# Patient Record
Sex: FEMALE | Race: WHITE | Employment: OTHER | URBAN - METROPOLITAN AREA
[De-identification: names, ages, dates, MRNs, and addresses within clinical notes are randomized per-mention and may not be internally consistent; named-entity substitution may affect disease eponyms.]

---

## 2017-01-10 RX ORDER — METOPROLOL TARTRATE 50 MG/1
TABLET ORAL
Qty: 180 TAB | Refills: 0 | Status: SHIPPED | OUTPATIENT
Start: 2017-01-10 | End: 2017-05-17 | Stop reason: SDUPTHER

## 2017-02-07 ENCOUNTER — TELEPHONE (OUTPATIENT)
Dept: INTERNAL MEDICINE CLINIC | Age: 62
End: 2017-02-07

## 2017-02-07 NOTE — TELEPHONE ENCOUNTER
Pt returning call.  Best contact [phone]       Message received & copied from [facility]

## 2017-02-08 NOTE — TELEPHONE ENCOUNTER
Called, spoke to pt. Two pt identifiers confirmed. Pt states that she received a call from the office 2/7/17 and vm said just to call back. Pt informed that there was no documentation that anyone from the office called. Pt informed she does have appt 2/15/17 1330 and may have been a reminder call. Pt verbalized understanding of information discussed w/ no further questions at this time.

## 2017-02-13 RX ORDER — PRAVASTATIN SODIUM 20 MG/1
TABLET ORAL
Qty: 30 TAB | Refills: 0 | Status: SHIPPED | OUTPATIENT
Start: 2017-02-13 | End: 2017-05-17 | Stop reason: SDUPTHER

## 2017-02-14 ENCOUNTER — APPOINTMENT (OUTPATIENT)
Dept: INTERNAL MEDICINE CLINIC | Age: 62
End: 2017-02-14

## 2017-02-14 DIAGNOSIS — Z00.00 PHYSICAL EXAM, ANNUAL: ICD-10-CM

## 2017-02-15 ENCOUNTER — OFFICE VISIT (OUTPATIENT)
Dept: INTERNAL MEDICINE CLINIC | Age: 62
End: 2017-02-15

## 2017-02-15 VITALS
BODY MASS INDEX: 38.06 KG/M2 | HEIGHT: 69 IN | WEIGHT: 257 LBS | DIASTOLIC BLOOD PRESSURE: 77 MMHG | SYSTOLIC BLOOD PRESSURE: 133 MMHG | TEMPERATURE: 97.5 F | OXYGEN SATURATION: 96 % | HEART RATE: 73 BPM

## 2017-02-15 DIAGNOSIS — E78.00 HYPERCHOLESTEROLEMIA: ICD-10-CM

## 2017-02-15 DIAGNOSIS — F32.9 REACTIVE DEPRESSION: ICD-10-CM

## 2017-02-15 DIAGNOSIS — E11.9 DIABETES MELLITUS WITHOUT COMPLICATION (HCC): ICD-10-CM

## 2017-02-15 DIAGNOSIS — Z11.59 NEED FOR HEPATITIS C SCREENING TEST: Primary | ICD-10-CM

## 2017-02-15 DIAGNOSIS — I10 ESSENTIAL HYPERTENSION: ICD-10-CM

## 2017-02-15 LAB
ALBUMIN SERPL-MCNC: 4.4 G/DL (ref 3.6–4.8)
ALBUMIN UR QL STRIP: 10 MG/L
ALBUMIN/GLOB SERPL: 1.6 {RATIO} (ref 1.1–2.5)
ALP SERPL-CCNC: 75 IU/L (ref 39–117)
ALT SERPL-CCNC: 20 IU/L (ref 0–32)
AST SERPL-CCNC: 20 IU/L (ref 0–40)
BILIRUB SERPL-MCNC: 0.5 MG/DL (ref 0–1.2)
BUN SERPL-MCNC: 15 MG/DL (ref 8–27)
BUN/CREAT SERPL: 21 (ref 11–26)
CALCIUM SERPL-MCNC: 10 MG/DL (ref 8.7–10.3)
CHLORIDE SERPL-SCNC: 100 MMOL/L (ref 96–106)
CO2 SERPL-SCNC: 28 MMOL/L (ref 18–29)
CREAT SERPL-MCNC: 0.71 MG/DL (ref 0.57–1)
CREATININE, URINE POC: 300 MG/DL
ERYTHROCYTE [DISTWIDTH] IN BLOOD BY AUTOMATED COUNT: 13.2 % (ref 12.3–15.4)
EST. AVERAGE GLUCOSE BLD GHB EST-MCNC: 140 MG/DL
GLOBULIN SER CALC-MCNC: 2.8 G/DL (ref 1.5–4.5)
GLUCOSE SERPL-MCNC: 116 MG/DL (ref 65–99)
HBA1C MFR BLD: 6.5 % (ref 4.8–5.6)
HCT VFR BLD AUTO: 41.7 % (ref 34–46.6)
HGB BLD-MCNC: 13.7 G/DL (ref 11.1–15.9)
MCH RBC QN AUTO: 29.3 PG (ref 26.6–33)
MCHC RBC AUTO-ENTMCNC: 32.9 G/DL (ref 31.5–35.7)
MCV RBC AUTO: 89 FL (ref 79–97)
MICROALBUMIN/CREAT RATIO POC: <30 MG/G
PLATELET # BLD AUTO: 226 X10E3/UL (ref 150–379)
POTASSIUM SERPL-SCNC: 5.3 MMOL/L (ref 3.5–5.2)
PROT SERPL-MCNC: 7.2 G/DL (ref 6–8.5)
RBC # BLD AUTO: 4.68 X10E6/UL (ref 3.77–5.28)
SODIUM SERPL-SCNC: 142 MMOL/L (ref 134–144)
WBC # BLD AUTO: 7.3 X10E3/UL (ref 3.4–10.8)

## 2017-02-15 RX ORDER — METFORMIN HYDROCHLORIDE 500 MG/1
500 TABLET, EXTENDED RELEASE ORAL
Qty: 30 TAB | Refills: 6 | Status: SHIPPED | OUTPATIENT
Start: 2017-02-15 | End: 2017-05-17 | Stop reason: SDUPTHER

## 2017-02-15 NOTE — MR AVS SNAPSHOT
Visit Information Date & Time Provider Department Dept. Phone Encounter #  
 2/15/2017  1:30 PM Stormy Bennett, [address] [phone] Follow-up Instructions Return in about 3 months (around 5/15/2017). Upcoming Health Maintenance Date Due Hepatitis C Screening 1955 DTaP/Tdap/Td series (1 - Tdap) 3/11/1975 BREAST CANCER SCRN MAMMOGRAM 7/7/2018 PAP AKA CERVICAL CYTOLOGY 10/12/2018 COLONOSCOPY 8/11/2026 Allergies as of 2/15/2017  Review Complete On: 2/15/2017 By: Stormy Bennett MD  
 No Known Allergies Current Immunizations  Never Reviewed Name Date Influenza Vaccine 10/10/2016, 9/22/2014 Influenza Vaccine Split 9/12/2012 Zoster Vaccine, Live 4/5/2015 Not reviewed this visit You Were Diagnosed With   
  
 Codes Comments Need for hepatitis C screening test    -  Primary ICD-10-CM: Z11.59 
ICD-9-CM: V73.89 Essential hypertension     ICD-10-CM: I10 
ICD-9-CM: 401.9 Hypercholesterolemia     ICD-10-CM: E78.00 ICD-9-CM: 272.0 Reactive depression     ICD-10-CM: F32.9 ICD-9-CM: 300.4 Diabetes mellitus without complication ([facility]     RFG-04-YM: E11.9 ICD-9-CM: 250.00 Vitals BP Pulse Temp Height(growth percentile) Weight(growth percentile) SpO2  
 133/77 (BP 1 Location: Right arm, BP Patient Position: Sitting) 73 97.5 °F (36.4 °C) (Oral) 5' 9\" (1.753 m) 257 lb (116.6 kg) 96% BMI OB Status Smoking Status 37.95 kg/m2 Postmenopausal Never Smoker BMI and BSA Data Body Mass Index Body Surface Area  
 37.95 kg/m 2 2.38 m 2 Preferred Pharmacy Pharmacy Name Phone [facility] [phone], [facility] AT [place] [address] [phone] Your Updated Medication List  
  
   
This list is accurate as of: 2/15/17  1:52 PM.  Always use your most recent med list.  
  
  
  
  
 aspirin delayed-release 81 mg tablet Commonly known as:  GSFAC-12 Take 1 Tab by mouth daily. Blood-Glucose Meter monitoring kit Commonly known as:  FREESTYLE LITE METER Daily  
  
 glucose blood VI test strips strip Commonly known as:  FREESTYLE LITE STRIPS  
2 per week  
  
 lisinopril-hydroCHLOROthiazide 10-12.5 mg per tablet Commonly known as:  PRINZIDE, ZESTORETIC  
TAKE 1 TABLET BY MOUTH EVERY DAY  
  
 metFORMIN  mg tablet Commonly known as:  GLUCOPHAGE XR Take 1 Tab by mouth daily (with dinner). metoprolol tartrate 50 mg tablet Commonly known as:  LOPRESSOR  
TAKE 1 TABLET BY MOUTH TWICE DAILY pravastatin 20 mg tablet Commonly known as:  PRAVACHOL  
TAKE 1 TABLET BY MOUTH EVERY NIGHT AT BEDTIME  
  
 sertraline 50 mg tablet Commonly known as:  ZOLOFT  
TAKE 1 TABLET BY MOUTH EVERY DAY Prescriptions Sent to Pharmacy Refills  
 metFORMIN ER (GLUCOPHAGE XR) 500 mg tablet 6 Sig: Take 1 Tab by mouth daily (with dinner). Class: Normal  
 [facility] [address] [place] [address] Ph #: [phone] Route: Oral  
  
We Performed the Following AMB POC URINE, MICROALBUMIN, SEMIQUANT (3 RESULTS) [64088 CPT(R)] Follow-up Instructions Return in about 3 months (around 5/15/2017). To-Do List   
 02/22/2017 Lab:  HEMOGLOBIN A1C WITH EAG   
  
 02/22/2017 Lab:  HEPATITIS C AB   
  
 02/22/2017 Lab:  LIPID PANEL   
  
 02/22/2017 Lab:  METABOLIC PANEL, COMPREHENSIVE   
  
 02/22/2017 Lab:  TSH 3RD GENERATION Patient Instructions Can taper off zoloft Start metformin xr 500mg Labs prior to follow up Eye exam  
 
  
Introducing [facility]! Dear Alexandrea: Thank you for requesting a [employer] account. Our records indicate that you already have an active [employer] account.   You can access your account anytime at [email or web address] Did you know that you can access your hospital and ER discharge instructions at any time in [employer]? You can also review all of your test results from your hospital stay or ER visit. Additional Information If you have questions, please visit the Frequently Asked Questions section of the [employer] website at [email or web address] Remember, [employer] is NOT to be used for urgent needs. For medical emergencies, dial 911. Now available from your iPhone and Android! Please provide this summary of care documentation to your next provider. Your primary care clinician is listed as Dann Akhtar. If you have any questions after today's visit, please call [phone].

## 2017-02-15 NOTE — PROGRESS NOTES
HISTORY OF PRESENT ILLNESS  June Meghann Ellington is a 57 y.o. female. HPI   Last here 10/11/16.  Pt is here to f/u on chronic conditions    BP today is 133/77  BP at home running around 130s/70s  Continues metoprolol 50mg BID and lisinopril-HCTZ 10-12.5mg daily     Pt checks her BS occasionally at home- has been 109, 110 in the AM fasting  BS at home 120s at the highest in the mornings when fasting  Her a1c is now in diabetic range in 2/17 at 6.5, she has been in diabetic ranges in the past-- about 10 years ago when she was about 20 lbs heavier  Discussed at this time she is considered diabetic and discussed this at length with her today  She wonders if this will improve with weight loss-addressed with her, discussed substantial w/l would be required to improve her a1c back in prediabetic ranges  However, her weight has not improved since 2010 and discussed this with her   Discussed either starting metformin today or losing about 20-30 lbs or more down the road   Discussed with her I would recommend metformin but I would also think that holding off is reasonable  She wonders what metformin does to her body- addressed with her, improves insulin resistance, would be able to come off this down the road potentially and is not dependence causing or habit forming  At this time, patient is amenable to starting metformin XR 500mg once daily, can take with either breakfast or dinner   As she is considered diabetic, needs annual eye exam and advised her to schedule this  Advised her to continue monitoring her BS at home, increase frequency of checks    Since last visit, pt has not been having recurrent dizziness   She wonders if this dizziness was related to her BS- addressed with her   No further episodes     Continues pravachol 20mg daily for cholesterol- at goal in June     Reviewed last labs 2/17  a1c in diabetic range at 6.5,  fasting, potassium mildly elevated   Ordered labs to complete prior to follow up    Wt is up 3 lbs since last visit  Pt has been working with [employer] program for w/l  She has been eating more with recent holidays  She wonders about wine as she has glasses of this-discussed  She has been cutting back on carbohydrates  Discussed diet and weight loss, needs substantial weight loss     Continues zoloft 50mg daily for depression, works well, happy with dose   Pt is not sure she needs this anymore and would like to try tapering off this   Discussed with her how to taper off this medication correctly  Discussed she can go back to taking this daily if she starts having depressive sx      PREVENTIVE:    Colonoscopy: 8/11/16, Dr. Umang Nation, repeat 10 years  Pap: [facility], 10/15, due, will schedule  Mammogram: 7/07/16 negative  Dexa: 6/13 normal, repeat 6/16, due, ordered, will schedule  Tdap: 3/31/2014    Pneumovax: out of this, will have at f/u  [address]: not yet needed  Zostavax: 4/05/2015  Flu shot: 10/10/2016  Foot exam: 6/16  Microalbumin: 1/16, 2/17 ordered   A1c: 5/13 6.2, 5/14 5.9 , 6/14 6.1, 9/14 5.9, 1/15 6.4, 7/15 5.8, 1/15 6.1, 6/16 6.4, 10/16 6.0, 2/17 6.5  Eye exam: working on this, advised to schedule, plans to complete this month 2/17 with Dr. Trixie Farias  Hep C screen: ordered to complete with next labs    Lipids: 6/16 LDL 69       Patient Active Problem List    Diagnosis Date Noted    Prediabetes 01/06/2016    Hypercholesterolemia     Hypertension     Glucose intolerance (impaired glucose tolerance)     Depression      Current Outpatient Prescriptions   Medication Sig Dispense Refill    pravastatin (PRAVACHOL) 20 mg tablet TAKE 1 TABLET BY MOUTH EVERY NIGHT AT BEDTIME 30 Tab 0    metoprolol tartrate (LOPRESSOR) 50 mg tablet TAKE 1 TABLET BY MOUTH TWICE DAILY 180 Tab 0    lisinopril-hydroCHLOROthiazide (PRINZIDE, ZESTORETIC) 10-12.5 mg per tablet TAKE 1 TABLET BY MOUTH EVERY DAY 90 Tab 0    sertraline (ZOLOFT) 50 mg tablet TAKE 1 TABLET BY MOUTH EVERY DAY 90 Tab 0    Blood-Glucose Meter (FREESTYLE LITE METER) monitoring kit Daily 1 Kit 0    glucose blood VI test strips (FREESTYLE LITE STRIPS) strip 2 per week 50 Strip 1    aspirin delayed-release (ASPIR-81) 81 mg tablet Take 1 Tab by mouth daily. 80 Tab 3     Past Surgical History   Procedure Laterality Date    Hx breast lumpectomy       benign; left    Colonoscopy N/A 8/11/2016     COLONOSCOPY [facility] by Danuta Dixon MD at [facility] ENDOSCOPY      Lab Results  Component Value Date/Time   WBC 7.3 02/14/2017 12:42 PM   HGB 13.7 02/14/2017 12:42 PM   HCT 41.7 02/14/2017 12:42 PM   PLATELET 931 53/37/6129 12:42 PM   MCV 89 02/14/2017 12:42 PM       Lab Results  Component Value Date/Time   Cholesterol, total 136 06/24/2016 01:40 PM   HDL Cholesterol 43 06/24/2016 01:40 PM   LDL, calculated 69 06/24/2016 01:40 PM   Triglyceride 120 06/24/2016 01:40 PM   CHOL/HDL Ratio 4.3 08/16/2010 01:49 PM       Lab Results  Component Value Date/Time   GFR est  02/14/2017 12:42 PM   GFR est non-AA 92 02/14/2017 12:42 PM   Creatinine 0.71 02/14/2017 12:42 PM   BUN 15 02/14/2017 12:42 PM   Sodium 142 02/14/2017 12:42 PM   Potassium 5.3 02/14/2017 12:42 PM   Chloride 100 02/14/2017 12:42 PM   CO2 28 02/14/2017 12:42 PM         Review of Systems   Respiratory: Negative for shortness of breath. Cardiovascular: Negative for chest pain. Physical Exam   Constitutional: She is oriented to person, place, and time. She appears well-developed and well-nourished. No distress. HENT:   Head: Normocephalic and atraumatic. Eyes: Conjunctivae and EOM are normal. Right eye exhibits no discharge. Left eye exhibits no discharge. Neck: Normal range of motion. Neck supple. Cardiovascular: Normal rate, regular rhythm and normal heart sounds. Exam reveals no gallop and no friction rub. No murmur heard. Pulmonary/Chest: Effort normal and breath sounds normal. No respiratory distress. She has no wheezes. She has no rales. She exhibits no tenderness.    Musculoskeletal: She exhibits no edema, tenderness or deformity. Lymphadenopathy:     She has no cervical adenopathy. Neurological: She is alert and oriented to person, place, and time. Coordination normal.   Skin: Skin is warm and dry. No rash noted. She is not diaphoretic. No erythema. No pallor. Psychiatric: She has a normal mood and affect. Her behavior is normal.       ASSESSMENT and PLAN    ICD-10-CM ICD-9-CM    1. Need for hepatitis C screening test    Ordered to complete with next labs A87.50 L04.86 METABOLIC PANEL, COMPREHENSIVE      HEMOGLOBIN A1C WITH EAG      TSH 3RD GENERATION      LIPID PANEL      HEPATITIS C AB   2. Essential hypertension    Controlled on lisinopril-HCTZ and metoprolol 50mg BID, continue. Q72 118.4 METABOLIC PANEL, COMPREHENSIVE      HEMOGLOBIN A1C WITH EAG      TSH 3RD GENERATION      LIPID PANEL      HEPATITIS C AB   3. Hypercholesterolemia    Controlled with pravachol, will repeat lipids prior to follow up. X76.70 523.8 METABOLIC PANEL, COMPREHENSIVE      HEMOGLOBIN A1C WITH EAG      TSH 3RD GENERATION      LIPID PANEL      HEPATITIS C AB   4. Reactive depression    On zoloft, she has been on this for many years and is unsure if she needs to stay on it. Discussed tapering off to every other day for about 4 weeks, then a few times per week, then stop if her sx of depression do not recur. R35.6 445.5 METABOLIC PANEL, COMPREHENSIVE      HEMOGLOBIN A1C WITH EAG      TSH 3RD GENERATION      LIPID PANEL      HEPATITIS C AB   5. Diabetes mellitus without complication ([address]    Relatively new dx, she had been told she was a diabetic in 2009 or 2010 and had lost a lot of weight and this had improved. However, her a1c is now up to 6.5, she is having BS readings into the 120s at home, consistent with diabetes. Due for eye exam and advised her to schedule, she will give urine sample today for microalbumin, will start metformin XR 500mg daily.  Had a long discussion with her regarding treatment options, she will work aggressively on weight loss. E11.9 250.00 AMB POC URINE, MICROALBUMIN, SEMIQUANT (3 RESULTS)      METABOLIC PANEL, COMPREHENSIVE      HEMOGLOBIN A1C WITH EAG      TSH 3RD GENERATION      LIPID PANEL      HEPATITIS C AB        Written by Reyes Zapata, as dictated by Tati Tamayo MD.    Current diagnosis and concerns discussed with pt at length. Understands risks and benefits or current treatment plan and medications and accepts the treatment and medication with any possible risks.   Pt asks appropriate questions which were answered.   Pt instructed to call with any concerns or problems.

## 2017-03-09 ENCOUNTER — PATIENT OUTREACH (OUTPATIENT)
Dept: OTHER | Age: 62
End: 2017-03-09

## 2017-03-09 NOTE — PROGRESS NOTES
Patient on report as eligible for Case Management. Left discreet message on voicemail with this CM contact information. Will attempt to contact again to offer [phone] Management services.

## 2017-03-10 ENCOUNTER — PATIENT OUTREACH (OUTPATIENT)
Dept: OTHER | Age: 62
End: 2017-03-10

## 2017-03-10 NOTE — PROGRESS NOTES
Patient returned previous outreach attempt. Verified identity with two identifiers. Discussed [phone] program. Patient consented for participation. Reports she needs to work on lowering her A1C. She is available Tuesday 03/14/17 to complete assessment and establish goals.  Welcome letter will be sent by email at patient's request.

## 2017-03-16 ENCOUNTER — PATIENT OUTREACH (OUTPATIENT)
Dept: OTHER | Age: 62
End: 2017-03-16

## 2017-03-16 NOTE — PROGRESS NOTES
[facility] progress note    Patient eligible for [employer] care management    PMH:   Past Medical History:   Diagnosis Date    Depression     Glucose intolerance (impaired glucose tolerance)     Hypercholesterolemia     Hypertension        Social History:   Social History     Social History    Marital status:      Spouse name: N/A    Number of children: N/A    Years of education: N/A     Occupational History    Not on file. Social History Main Topics    Smoking status: Never Smoker    Smokeless tobacco: Never Used    Alcohol use Yes      Comment: social    Drug use: No    Sexual activity: Yes     Partners: Male     Other Topics Concern    Not on file     Social History Narrative       Two patient identifiers verified. Patient has diagnosis of HTN, prediabetes, and hypercholesterolemia. Care management assessment initiated:    Patient stated problem: \"I really need to get my A1C lower\"  Patient stated Strength: pt reports commitment to improve health due to strong family history of diabetes and debility. Patient stated Weakness: \"I like to snack\"    Role of nutrition and exercise and its effect on A1C reviewed with patient. Patient is already making positive dietary adjustments. She has a very busy job so we will assist in food label reading as well as making choices for quick, nutrient dense breakfasts and lunches. She is currently decreasing the diet sodas she consumes and replacing them with water. She will keep a food log for the next week to better understand her food choices, saravanan snacks. Emotional Status/Adjustment to Health State: Pt reports feeling positive about these changes. States \"I know I can do this. \"    Barriers/Challenges to Care: Pt reports very busy work schedule and fatigue once home from work. Patient identified Short Term Goal :  1. Begin exercise program 3-5 times/week (suggested starting 10-15 minute walking program).  We discussed how to incorporate this into her work day. 2. Continue to substitute diet soda for water- patient reports she wants to stop drinking diet sodas entirely. We discussed ways to flavor water (fresh fruit/lemon). 3. Keep food log for 1 week to better understand nutrition. Patient identified Long Term Goal: decrease A1C    Patient reports next scheduled follow up with with PCP 05/17. Next planned call from [facility] 3/23/17    Patient verbalized understanding of all information discussed. Pt has my contact information for any further questions, concerns, or needs. Pt has requested the following information be emailed:    1. Suggestions for on-the-go breakfast/lunch choices   2. Information on whole grains   3. Suggestions on healthy dining at a [facility] [employer].

## 2017-03-17 ENCOUNTER — PATIENT OUTREACH (OUTPATIENT)
Dept: OTHER | Age: 62
End: 2017-03-17

## 2017-03-17 NOTE — PROGRESS NOTES
Email sent to patient per patient's request. Patient had wanted more information regarding whole grain food options. Sent ChooseMyPlate whole grains educational PDF, we will discuss choices at the next CM call. Patient had also requested suggestions for fast, healthy breakfast choices. Recipes from ADA and AHA sent along with quick breakfast options from [employer]. She had also requested assistance in making health choices at [employer]. She was encouraged to avoid crunchy foods as they often are fried and to select fish, chicken or black bean options; minimizing cheese, sour cream or guacomole and instead to select vegetable toppings and salsa. Next scheduled CM call 03/23/17.

## 2017-03-20 RX ORDER — LISINOPRIL AND HYDROCHLOROTHIAZIDE 10; 12.5 MG/1; MG/1
TABLET ORAL
Qty: 90 TAB | Refills: 0 | Status: SHIPPED | OUTPATIENT
Start: 2017-03-20 | End: 2017-05-17 | Stop reason: SDUPTHER

## 2017-03-20 RX ORDER — PRAVASTATIN SODIUM 20 MG/1
TABLET ORAL
Qty: 30 TAB | Refills: 0 | Status: SHIPPED | OUTPATIENT
Start: 2017-03-20 | End: 2017-05-17 | Stop reason: SDUPTHER

## 2017-03-20 RX ORDER — SERTRALINE HYDROCHLORIDE 50 MG/1
TABLET, FILM COATED ORAL
Qty: 90 TAB | Refills: 0 | Status: SHIPPED | OUTPATIENT
Start: 2017-03-20 | End: 2017-05-17 | Stop reason: SDUPTHER

## 2017-03-23 ENCOUNTER — PATIENT OUTREACH (OUTPATIENT)
Dept: OTHER | Age: 62
End: 2017-03-23

## 2017-03-24 ENCOUNTER — PATIENT OUTREACH (OUTPATIENT)
Dept: OTHER | Age: 62
End: 2017-03-24

## 2017-03-24 NOTE — PROGRESS NOTES
Outgoing email sent to patient with [employer] information on fruit. Also attached 2 AHA recipes. Patient does not like salmon so tuna recipe was sent as alternative.   Next CM outreach: 04/04/17

## 2017-04-04 ENCOUNTER — PATIENT OUTREACH (OUTPATIENT)
Dept: OTHER | Age: 62
End: 2017-04-04

## 2017-04-04 NOTE — PROGRESS NOTES
Telephone outreach from patient. Patient identity verified with two identifiers. She reports she is currently at work, however has a break and wanted to share some positive news. Ms. Bony Ocampo reports her fasting blood sugar this morning was 108. She has taken it before and it has been 120-130's, so she reports being pleased with this change. She also reports that she has lost a total of 3 pounds in the last few weeks. Ms. Bony Ocampo states that she is continuing to exercise almost daily- either walking, or doing an aerobics program on video. She did a lot of gardening and yard work this weekend. She states the exercise is really helping her to feel better overall. She has also been working on improving her nutrition. She is increasing her fresh vegetables and trying to make different choices for snacks and quick foods. She has stopped drinking soda entirely and has changed her popcorn choice from butter to air popped. Care Plan:  Long Term Goal: Ms. Bony Ocampo would like to decrease her A1C. Her last A1C was 6.0, however she reports that she has not been focusing upon her health and she is worried about complications of diabetes. Short Term Goal: Improve nutrition. Ms. Bony Ocampo has been using the Plate Method from [facility]. She has been focusing on improving her snack choices. She has stopped drinking sodas and increased her water consumption. She is currently working on increasing her vegetable intake. Short Term Goal: Begin exercising. Currently Ms. Bony Ocampo is exercising 5 days a week- either walking or aerobics. She reports she is really enjoying this and is feeling better in general since starting this routine. Preventative Care: Ms. Bony Ocampo needs to schedule an ophthalmology appointment. She reports she has the name of a provider she wants to use. She was encouraged to confirm they were in network and then to schedule a check-up.  She was notified she should request her visit records be sent to her PCP.     Next CM  Follow-up: 04/18/17

## 2017-04-13 ENCOUNTER — TELEPHONE (OUTPATIENT)
Dept: INTERNAL MEDICINE CLINIC | Age: 62
End: 2017-04-13

## 2017-04-13 NOTE — TELEPHONE ENCOUNTER
Spoke to [employer] ([employer]). Cris Mujica states that the labs collected 2/14/17 were coded as Physical Routine labs instead of it being r/t Dm/blood sugar. Cris Mujica informed that LPN RR will reach out to [facility] to give the correct dx code. Cris Mujica verbalized understanding of information discussed w/ no further questions at this time.

## 2017-04-13 NOTE — TELEPHONE ENCOUNTER
Patient's , Ruth Dominguez states he needs a call back in reference to patient's recent lab orders being coded incorrectly & needing to be corrected & re-submitted. Please call to discuss.  Thank you

## 2017-04-14 NOTE — TELEPHONE ENCOUNTER
Spoke to [employer] at [employer]. Geraldo Daley informed that the labs from 2/14/17 need a new dx code to be covered. Geraldo Daley given dx code E11.9 as noted in PCP last office note 2/15/17. Geraldo Daley states she will re-file for insurance purposes.

## 2017-04-17 RX ORDER — PRAVASTATIN SODIUM 20 MG/1
TABLET ORAL
Qty: 30 TAB | Refills: 0 | Status: SHIPPED | OUTPATIENT
Start: 2017-04-17 | End: 2017-05-17 | Stop reason: SDUPTHER

## 2017-04-18 ENCOUNTER — PATIENT OUTREACH (OUTPATIENT)
Dept: OTHER | Age: 62
End: 2017-04-18

## 2017-04-18 NOTE — PROGRESS NOTES
Telephone outreach to patient for care management follow-up. Identity confirmed with two identifiers. She reports she is doing well. States she spent [place] with family and did not follow her nutrition regimen, but that she has since gone back to low carb, no concentrated sweets. Mrs. Vibha Sabillon reports she has completely stopped drinking soda and is now drinking water. She states she is attempting to eat a balanced breakfast each morning- we have provided suggestions from ADA. She states she is still walking 3-5 times per week. She is also using an aerobics video on days she is off from work. She reports she feels very good and is sleeping well. She did call and schedule an eye exam with an ophthalmologist- she reports the earliest appointment was 06/08/17. She has also called and scheduled her well woman visit for 06/08/17. Mrs. Vibha Sabillon has a PCP appointment 05/17. She would like for us to call after this appointment to see whether she has any other health goals. She reports she feels like she can continue to make these dietary improvements and maintain her exercise schedule. She states \"I'm creating a whole new lifestyle for myself. \"

## 2017-05-11 ENCOUNTER — LAB ONLY (OUTPATIENT)
Dept: INTERNAL MEDICINE CLINIC | Age: 62
End: 2017-05-11

## 2017-05-11 DIAGNOSIS — F32.9 REACTIVE DEPRESSION: ICD-10-CM

## 2017-05-11 DIAGNOSIS — I10 ESSENTIAL HYPERTENSION: ICD-10-CM

## 2017-05-11 DIAGNOSIS — Z11.59 NEED FOR HEPATITIS C SCREENING TEST: ICD-10-CM

## 2017-05-11 DIAGNOSIS — E11.9 DIABETES MELLITUS WITHOUT COMPLICATION (HCC): ICD-10-CM

## 2017-05-11 DIAGNOSIS — E78.00 HYPERCHOLESTEROLEMIA: ICD-10-CM

## 2017-05-12 LAB
ALBUMIN SERPL-MCNC: 4.5 G/DL (ref 3.6–4.8)
ALBUMIN/GLOB SERPL: 1.6 {RATIO} (ref 1.2–2.2)
ALP SERPL-CCNC: 70 IU/L (ref 39–117)
ALT SERPL-CCNC: 23 IU/L (ref 0–32)
AST SERPL-CCNC: 17 IU/L (ref 0–40)
BILIRUB SERPL-MCNC: 0.5 MG/DL (ref 0–1.2)
BUN SERPL-MCNC: 17 MG/DL (ref 8–27)
BUN/CREAT SERPL: 23 (ref 12–28)
CALCIUM SERPL-MCNC: 9.7 MG/DL (ref 8.7–10.3)
CHLORIDE SERPL-SCNC: 104 MMOL/L (ref 96–106)
CHOLEST SERPL-MCNC: 135 MG/DL (ref 100–199)
CO2 SERPL-SCNC: 25 MMOL/L (ref 18–29)
CREAT SERPL-MCNC: 0.74 MG/DL (ref 0.57–1)
EST. AVERAGE GLUCOSE BLD GHB EST-MCNC: 131 MG/DL
GLOBULIN SER CALC-MCNC: 2.8 G/DL (ref 1.5–4.5)
GLUCOSE SERPL-MCNC: 125 MG/DL (ref 65–99)
HBA1C MFR BLD: 6.2 % (ref 4.8–5.6)
HCV AB S/CO SERPL IA: <0.1 S/CO RATIO (ref 0–0.9)
HDLC SERPL-MCNC: 41 MG/DL
LDLC SERPL CALC-MCNC: 67 MG/DL (ref 0–99)
POTASSIUM SERPL-SCNC: 4.7 MMOL/L (ref 3.5–5.2)
PROT SERPL-MCNC: 7.3 G/DL (ref 6–8.5)
SODIUM SERPL-SCNC: 143 MMOL/L (ref 134–144)
TRIGL SERPL-MCNC: 133 MG/DL (ref 0–149)
TSH SERPL DL<=0.005 MIU/L-ACNC: 0.84 UIU/ML (ref 0.45–4.5)
VLDLC SERPL CALC-MCNC: 27 MG/DL (ref 5–40)

## 2017-05-17 ENCOUNTER — OFFICE VISIT (OUTPATIENT)
Dept: INTERNAL MEDICINE CLINIC | Age: 62
End: 2017-05-17

## 2017-05-17 VITALS
TEMPERATURE: 97.8 F | RESPIRATION RATE: 16 BRPM | SYSTOLIC BLOOD PRESSURE: 120 MMHG | BODY MASS INDEX: 37.77 KG/M2 | DIASTOLIC BLOOD PRESSURE: 73 MMHG | HEART RATE: 62 BPM | HEIGHT: 69 IN | WEIGHT: 255 LBS | OXYGEN SATURATION: 96 %

## 2017-05-17 DIAGNOSIS — Z23 ENCOUNTER FOR IMMUNIZATION: ICD-10-CM

## 2017-05-17 DIAGNOSIS — E66.9 OBESITY (BMI 35.0-39.9 WITHOUT COMORBIDITY): ICD-10-CM

## 2017-05-17 DIAGNOSIS — Z12.39 BREAST SCREENING: ICD-10-CM

## 2017-05-17 DIAGNOSIS — E11.9 DIABETES MELLITUS WITHOUT COMPLICATION (HCC): Primary | ICD-10-CM

## 2017-05-17 DIAGNOSIS — I10 ESSENTIAL HYPERTENSION: ICD-10-CM

## 2017-05-17 DIAGNOSIS — F32.9 REACTIVE DEPRESSION: ICD-10-CM

## 2017-05-17 DIAGNOSIS — D18.01 CHERRY HEMANGIOMA: ICD-10-CM

## 2017-05-17 DIAGNOSIS — E78.00 HYPERCHOLESTEROLEMIA: ICD-10-CM

## 2017-05-17 RX ORDER — METOPROLOL TARTRATE 50 MG/1
50 TABLET ORAL 2 TIMES DAILY
Qty: 60 TAB | Refills: 6 | Status: SHIPPED | OUTPATIENT
Start: 2017-05-17 | End: 2017-11-28 | Stop reason: SDUPTHER

## 2017-05-17 RX ORDER — METFORMIN HYDROCHLORIDE 500 MG/1
1000 TABLET, EXTENDED RELEASE ORAL
Qty: 60 TAB | Refills: 6 | Status: SHIPPED | OUTPATIENT
Start: 2017-05-17 | End: 2017-09-19 | Stop reason: SDUPTHER

## 2017-05-17 RX ORDER — PRAVASTATIN SODIUM 20 MG/1
20 TABLET ORAL DAILY
Qty: 30 TAB | Refills: 6 | Status: SHIPPED | OUTPATIENT
Start: 2017-05-17 | End: 2017-12-18 | Stop reason: SDUPTHER

## 2017-05-17 RX ORDER — LISINOPRIL AND HYDROCHLOROTHIAZIDE 10; 12.5 MG/1; MG/1
1 TABLET ORAL DAILY
Qty: 30 TAB | Refills: 6 | Status: SHIPPED | OUTPATIENT
Start: 2017-05-17 | End: 2017-12-18 | Stop reason: SDUPTHER

## 2017-05-17 RX ORDER — SERTRALINE HYDROCHLORIDE 50 MG/1
50 TABLET, FILM COATED ORAL DAILY
Qty: 90 TAB | Refills: 3 | Status: SHIPPED | OUTPATIENT
Start: 2017-05-17 | End: 2017-11-24 | Stop reason: SDUPTHER

## 2017-05-17 NOTE — MR AVS SNAPSHOT
Visit Information Date & Time Provider Department Dept. Phone Encounter #  
 5/17/2017  3:00 PM Ivanna Scott, [address] [phone] [id number] Follow-up Instructions Return in about 3 months (around 8/17/2017). Your Appointments 5/17/2017  3:00 PM  
ROUTINE CARE with Ivanna Scott, [address] [facility]) Appt Note: 3 month f/u  
 [address] [address]  
[address] [facility] [address] [facility] Upcoming Health Maintenance Date Due DTaP/Tdap/Td series (1 - Tdap) 3/11/1975 INFLUENZA AGE 9 TO ADULT 8/1/2017 BREAST CANCER SCRN MAMMOGRAM 7/7/2018 PAP AKA CERVICAL CYTOLOGY 10/12/2018 COLONOSCOPY 8/11/2026 Allergies as of 5/17/2017  Review Complete On: 2/15/2017 By: Ivanna Scott MD  
 No Known Allergies Current Immunizations  Never Reviewed Name Date Influenza Vaccine 10/10/2016, 9/22/2014 Influenza Vaccine Split 9/12/2012 Zoster Vaccine, Live 4/5/2015 Not reviewed this visit You Were Diagnosed With   
  
 Codes Comments Diabetes mellitus without complication ([facility]    -  Primary ICD-10-CM: E11.9 ICD-9-CM: 250.00 Essential hypertension     ICD-10-CM: I10 
ICD-9-CM: 401.9 Reactive depression     ICD-10-CM: F32.9 ICD-9-CM: 300.4 Hypercholesterolemia     ICD-10-CM: E78.00 ICD-9-CM: 272.0 Obesity (BMI 35.0-39.9 without comorbidity) ([facility]     ICD-10-CM: Z47.4 ICD-9-CM: 278.00 Breast screening     ICD-10-CM: Z12.39 
ICD-9-CM: V76.10 Cherry hemangioma     ICD-10-CM: I78.1 ICD-9-CM: 219. 1 Vitals BP Pulse Temp Resp Height(growth percentile) Weight(growth percentile) 120/73 (BP 1 Location: Left arm, BP Patient Position: Sitting) 62 97.8 °F (36.6 °C) (Oral) 16 5' 9\" (1.753 m) 255 lb (115.7 kg) SpO2 BMI OB Status Smoking Status 96% 37.66 kg/m2 Postmenopausal Never Smoker Vitals History BMI and BSA Data Body Mass Index Body Surface Area  
 37.66 kg/m 2 2.37 m 2 Preferred Pharmacy Pharmacy Name [facility] [phone] - [phone], [address] AT [place] [address] [phone] Your Updated Medication List  
  
   
This list is accurate as of: 5/17/17  2:14 PM.  Always use your most recent med list.  
  
  
  
  
 aspirin delayed-release 81 mg tablet Commonly known as:  WGSLA-67 Take 1 Tab by mouth daily. Blood-Glucose Meter monitoring kit Commonly known as:  FREESTYLE LITE METER Daily  
  
 glucose blood VI test strips strip Commonly known as:  FREESTYLE LITE STRIPS  
2 per week  
  
 lisinopril-hydroCHLOROthiazide 10-12.5 mg per tablet Commonly known as:  Beula Sharmila Take 1 Tab by mouth daily. metFORMIN  mg tablet Commonly known as:  GLUCOPHAGE XR Take 2 Tabs by mouth daily (with dinner). metoprolol tartrate 50 mg tablet Commonly known as:  LOPRESSOR Take 1 Tab by mouth two (2) times a day. pravastatin 20 mg tablet Commonly known as:  PRAVACHOL Take 1 Tab by mouth daily. sertraline 50 mg tablet Commonly known as:  ZOLOFT Take 1 Tab by mouth daily. Prescriptions Sent to Pharmacy Refills  
 sertraline (ZOLOFT) 50 mg tablet 3 Sig: Take 1 Tab by mouth daily. Class: Normal  
 Pharmacy: [facility] [address] [place] [address] [address] [place] [address] Ph #: [phone] Route: Oral  
 lisinopril-hydroCHLOROthiazide (PRINZIDE, ZESTORETIC) 10-12.5 mg per tablet 6 Sig: Take 1 Tab by mouth daily. Class: Normal  
 Pharmacy: [facility] [address] [place] [address] [address] [place] [address] Ph #: [phone]  Route: Oral  
 pravastatin (PRAVACHOL) 20 mg tablet 6  
 Sig: Take 1 Tab by mouth daily. Class: Normal  
 Pharmacy: [facility] [address] [place] [address] Ph #: [phone] Route: Oral  
 metoprolol tartrate (LOPRESSOR) 50 mg tablet 6 Sig: Take 1 Tab by mouth two (2) times a day. Class: Normal  
 Pharmacy: [facility] [address] [place] [address] Ph #: [phone] Route: Oral  
 metFORMIN ER (GLUCOPHAGE XR) 500 mg tablet 6 Sig: Take 2 Tabs by mouth daily (with dinner). Class: Normal  
 Pharmacy: [facility] [address] [place] [address] Ph #: [phone] Route: Oral  
  
We Performed the Following  DIABETES FOOT EXAM [7 Custom] Follow-up Instructions Return in about 3 months (around 8/17/2017). To-Do List   
 05/17/2017 Imaging:  [facility] BI SCREENING INCL CAD   
  
 05/24/2017 Lab:  HEMOGLOBIN A1C WITH EAG   
  
 05/24/2017 Lab:  METABOLIC PANEL, BASIC Patient Instructions Increase metformin to 2 per day Labs prior to follow up Introducing hospitals & HEALTH SERVICES! Dear Alexandrea: Thank you for requesting a SocialCompare account. Our records indicate that you already have an active SocialCompare account. You can access your account anytime at [email or web address] Did you know that you can access your hospital and ER discharge instructions at any time in SocialCompare? You can also review all of your test results from your hospital stay or ER visit. Additional Information If you have questions, please visit the Frequently Asked Questions section of the SocialCompare website at [email or web address] Remember, SocialCompare is NOT to be used for urgent needs. For medical emergencies, dial 911. Now available from your iPhone and Android! Please provide this summary of care documentation to your next provider. Your primary care clinician is listed as Julia Jeronimo. If you have any questions after today's visit, please call [phone].

## 2017-05-17 NOTE — PROGRESS NOTES
HISTORY OF PRESENT ILLNESS  June Angus Lou is a 60 y.o. female. HPI   Last here 2/15/17.  Pt is here to f/u on chronic conditions    BP today is 120/73  BP at home running around 120s/70s  Continues metoprolol 50mg BID and lisinopril-HCTZ 10-12.5mg daily     BS at home running around 106, 103, highest 116 in the AM fasting  Last visit, I dx'd patient with diabetes as she had a1c of 6.5 on February labs   I started her on metformin XR 500mg once daily for this   She has been taking this daily and tolerating well, no SE with this   Pt has been feeling much better since starting this medication overall   Discussed could increase to BID dosing for further weight loss benefits and insulin stabilization  Discussed GLP1 agonist down the road for weight loss and this produces more significant results   Pt is interested in increasing her metformin to BID dosing at this time  She will think about GLP1 agonist     Continues pravachol 20mg daily for cholesterol- at goal this month    Pt has a mole to her back she would like me to examine today   Discussed benign cherry hemangioma    Reviewed last labs 5/17  a1c improved, hep C negative, thyroid nl, LDL at goal, kidney nl, liver nl  Ordered labs to complete prior to follow up     Wt is down 2 lbs since last visit  She had been down a bit more but ate heavily over the weekend   She states metformin is not really helping with weight loss   Pt is going to focus on this more aggressively   Pt has completely cut out sodas from her diet  She has been walking more for exercise 3 times weekly  She walks for extended periods of time with each walk  Discussed weight loss on metformin is minimal  Discussed diet and weight loss with her at length  Advised continued diet and weight loss   Suggested decreasing portion sizes      Continues zoloft 50mg daily for depression, works well, happy with dose   Pt is not sure she needs this anymore and would like to try tapering off this   Discussed with her how to taper off this medication correctly  Discussed she can go back to taking this daily if she starts having depressive sx        PREVENTIVE:    Colonoscopy: 8/11/16, Dr. Valentine Mitchell, repeat 10 years  Pap: [facility], 10/15, scheduled 6/06/17  Mammogram: 7/07/16 negative, due 7/17, ordered, will schedule  DEXA: 6/13 normal, repeat due, ordered, will schedule  Tdap: 3/31/2014    Pneumovax: given today, 5/17/2017  [id number]: not yet needed  Zostavax: 4/05/2015  Flu shot: 10/10/2016  Foot exam: 5/17/17  Microalbumin: 2/17 very minimal  A1c: 5/13 6.2, 5/14 5.9 , 6/14 6.1, 9/14 5.9, 1/15 6.4, 7/15 5.8, 1/15 6.1, 6/16 6.4, 10/16 6.0, 2/17 6.5, 5/17 6.2   Eye exam: Dr. Alex Page, scheduled 6/06/17   Hep C screen: 5/17 negative  Lipids: 5/17 LDL 67        Patient Active Problem List    Diagnosis Date Noted    Prediabetes 01/06/2016    Hypercholesterolemia     Hypertension     Glucose intolerance (impaired glucose tolerance)     Depression      Current Outpatient Prescriptions   Medication Sig Dispense Refill    sertraline (ZOLOFT) 50 mg tablet TAKE 1 TABLET BY MOUTH EVERY DAY 90 Tab 0    lisinopril-hydroCHLOROthiazide (PRINZIDE, ZESTORETIC) 10-12.5 mg per tablet TAKE 1 TABLET BY MOUTH EVERY DAY 90 Tab 0    metFORMIN ER (GLUCOPHAGE XR) 500 mg tablet Take 1 Tab by mouth daily (with dinner). 30 Tab 6    pravastatin (PRAVACHOL) 20 mg tablet TAKE 1 TABLET BY MOUTH EVERY NIGHT AT BEDTIME 30 Tab 0    metoprolol tartrate (LOPRESSOR) 50 mg tablet TAKE 1 TABLET BY MOUTH TWICE DAILY 180 Tab 0    Blood-Glucose Meter (FREESTYLE LITE METER) monitoring kit Daily 1 Kit 0    glucose blood VI test strips (FREESTYLE LITE STRIPS) strip 2 per week 50 Strip 1    aspirin delayed-release (ASPIR-81) 81 mg tablet Take 1 Tab by mouth daily.  90 Tab 3     Past Surgical History:   Procedure Laterality Date    COLONOSCOPY N/A 8/11/2016    COLONOSCOPY performed by Stephany Hodges MD at [facility]COPY    HX BREAST LUMPECTOMY benign; left      Lab Results  Component Value Date/Time   WBC 7.3 02/14/2017 12:42 PM   HGB 13.7 02/14/2017 12:42 PM   HCT 41.7 02/14/2017 12:42 PM   PLATELET 923 82/43/8863 12:42 PM   MCV 89 02/14/2017 12:42 PM       Lab Results  Component Value Date/Time   Cholesterol, total 135 05/11/2017 09:09 AM   HDL Cholesterol 41 05/11/2017 09:09 AM   LDL, calculated 67 05/11/2017 09:09 AM   Triglyceride 133 05/11/2017 09:09 AM   CHOL/HDL Ratio 4.3 08/16/2010 01:49 PM       Lab Results  Component Value Date/Time   GFR est  05/11/2017 09:09 AM   GFR est non-AA 86 05/11/2017 09:09 AM   Creatinine 0.74 05/11/2017 09:09 AM   BUN 17 05/11/2017 09:09 AM   Sodium 143 05/11/2017 09:09 AM   Potassium 4.7 05/11/2017 09:09 AM   Chloride 104 05/11/2017 09:09 AM   CO2 25 05/11/2017 09:09 AM         Review of Systems   Respiratory: Negative for shortness of breath. Cardiovascular: Negative for chest pain. Physical Exam   Constitutional: She is oriented to person, place, and time. She appears well-developed and well-nourished. No distress. HENT:   Head: Normocephalic and atraumatic. Mouth/Throat: Oropharynx is clear and moist. No oropharyngeal exudate. Eyes: Conjunctivae and EOM are normal. Right eye exhibits no discharge. Left eye exhibits no discharge. Neck: Normal range of motion. Neck supple. Cardiovascular: Normal rate, regular rhythm, normal heart sounds and intact distal pulses. Exam reveals no gallop and no friction rub. No murmur heard. Pulmonary/Chest: Effort normal and breath sounds normal. No respiratory distress. She has no wheezes. She has no rales. She exhibits no tenderness. Musculoskeletal: Normal range of motion. She exhibits no edema, tenderness or deformity. Lymphadenopathy:     She has no cervical adenopathy. Neurological: She is alert and oriented to person, place, and time.  Coordination normal.   Monofilament nl BLE, good peripheral pulses, no ulcers     Skin: Skin is warm and dry. No rash noted. She is not diaphoretic. No erythema. No pallor. Cherry hemangioma R upper back   Psychiatric: She has a normal mood and affect. Her behavior is normal.       ASSESSMENT and PLAN    ICD-10-CM ICD-9-CM    1. Diabetes mellitus without complication ([address]    Wt unchanged. Tolerating metformin well. Home readings overall good. Will increase metformin XR 500mg daily to two tablets daily. Continue to monitor, check a1c prior to follow. E11.9 250.00 [facility] BI SCREENING INCL CAD      HEMOGLOBIN A1C WITH EAG      METABOLIC PANEL, BASIC       DIABETES FOOT EXAM   2. Essential hypertension    Well controlled on lisinopril-HCTZ 10-12.5mg and metoprolol 50mg BID. I10 401.9 [facility] BI SCREENING INCL CAD      HEMOGLOBIN A1C WITH EAG      METABOLIC PANEL, BASIC   3. Reactive depression    Stable on zoloft, continue. F32.9 300.4 [facility] BI SCREENING INCL CAD      HEMOGLOBIN A1C WITH EAG      METABOLIC PANEL, BASIC   4. Hypercholesterolemia    Well controlled on recent labs, continue pravachol 20mg daily. E78.00 272.0 [facility] BI SCREENING INCL CAD      HEMOGLOBIN A1C WITH EAG      METABOLIC PANEL, BASIC   5. Obesity (BMI 35.0-39.9 without comorbidity) (HCC)    Wt stable, will increase metformin which can help with w/l, also discussed GLP1 agonists which can help with weight loss, she will think about this. E66.9 278.00 [facility] BI SCREENING INCL CAD      HEMOGLOBIN A1C WITH EAG      METABOLIC PANEL, BASIC   6. Breast screening    Due for mammogram next month, ordered. Z12.39 V76.10 [facility] BI SCREENING INCL CAD      HEMOGLOBIN A1C WITH EAG      METABOLIC PANEL, BASIC   7. Cherry hemangioma    Benign  I78.1 448. 1           Written by Teri Rasmussen, as dictated by Angelita See MD.    Current diagnosis and concerns discussed with pt at length.  Understands risks and benefits or current treatment plan and medications and accepts the treatment and medication with any possible risks.   Pt asks appropriate questions which were answered.   Pt instructed to call with any concerns or problems. This note will not be viewable in [address]

## 2017-05-23 ENCOUNTER — PATIENT OUTREACH (OUTPATIENT)
Dept: OTHER | Age: 62
End: 2017-05-23

## 2017-06-06 ENCOUNTER — PATIENT OUTREACH (OUTPATIENT)
Dept: OTHER | Age: 62
End: 2017-06-06

## 2017-06-06 NOTE — PROGRESS NOTES
Telephone outreach from patient; returning previous outreach attempt. Identity verified with two identifiers. Ms. Candida Rubio reports that she had her PCP check-up. She states she was a little disappointed that her A1C was not lower. We discussed the positive changes that she is making towards better general health and that an A1C is just one reflection of this. She reports her cholesterol and other labs were good and that she was reassured by that. She states she has had a harder time remaining consistent with her nutrition goals, however, she states that she is motivated to keep trying towards smaller portions and better general nutrition. She finds busy days at work to be the most difficult to plan for. She does report that when she realizes that she has not made the best nutrition choices, she is quick to attempt to follow her nutrition goals. We discussed how difficult lifestyle changes can be, and the importance of resuming these positive health habits as soon as she can. Ms. Candida Rubio states she continues to walk for exercise. She would like to increase this, however, reports a busy work schedule often prevents more exercise. Her goal remains 3-4 days of walking 30 minutes. She states she has been walking in the evenings and that she feels good after she exercises, which is keeping her motivated to continue to exercise. Ms. Candida Rubio confirms she has her pap and ophthalmology screenings scheduled for this week. She has her mammogram scheduled for July. She will have a repeat visit to assess A1C in August. She reports she feels she understands what she needs to do to continue to maintain good control of her diabetes and hypertension. We reviewed how this requires lifelong management and that she will find that her needs might change as she ages or her course progresses. We will graduate Ms. Candida Rubio from care management at this time. She has my contact information should she have any future needs.

## 2017-06-06 NOTE — PROGRESS NOTES
Telephone outreach attempted for care management follow-up. Left discreet VM with my contact information. Per chart review, patient had PCP follow-up for chronic disease management. Weight down 2 lbs. A1C improved from 6.5 to 6.2. Plan was to increase metformin to BID. Patient has scheduled health maintenance of pap and ophthalmology follow-up for this month.

## 2017-06-14 RX ORDER — INSULIN PUMP SYRINGE, 3 ML
EACH MISCELLANEOUS
Qty: 1 KIT | Refills: 0 | Status: SHIPPED | OUTPATIENT
Start: 2017-06-14 | End: 2018-12-17

## 2017-07-13 ENCOUNTER — HOSPITAL ENCOUNTER (OUTPATIENT)
Dept: MAMMOGRAPHY | Age: 62
Discharge: HOME | End: 2017-07-13
Attending: INTERNAL MEDICINE
Payer: COMMERCIAL

## 2017-07-13 DIAGNOSIS — I10 ESSENTIAL HYPERTENSION: ICD-10-CM

## 2017-07-13 DIAGNOSIS — F32.9 REACTIVE DEPRESSION: ICD-10-CM

## 2017-07-13 DIAGNOSIS — E78.00 HYPERCHOLESTEROLEMIA: ICD-10-CM

## 2017-07-13 DIAGNOSIS — E11.9 DIABETES MELLITUS WITHOUT COMPLICATION (HCC): ICD-10-CM

## 2017-07-13 DIAGNOSIS — Z12.39 BREAST SCREENING: ICD-10-CM

## 2017-07-13 DIAGNOSIS — E66.9 OBESITY (BMI 35.0-39.9 WITHOUT COMORBIDITY): ICD-10-CM

## 2017-07-13 PROCEDURE — 77067 SCR MAMMO BI INCL CAD: CPT

## 2017-08-14 ENCOUNTER — LAB ONLY (OUTPATIENT)
Dept: INTERNAL MEDICINE CLINIC | Age: 62
End: 2017-08-14

## 2017-08-14 DIAGNOSIS — E66.9 OBESITY (BMI 35.0-39.9 WITHOUT COMORBIDITY): ICD-10-CM

## 2017-08-14 DIAGNOSIS — Z12.39 BREAST SCREENING: ICD-10-CM

## 2017-08-14 DIAGNOSIS — E11.9 DIABETES MELLITUS WITHOUT COMPLICATION (HCC): ICD-10-CM

## 2017-08-14 DIAGNOSIS — E78.00 HYPERCHOLESTEROLEMIA: ICD-10-CM

## 2017-08-14 DIAGNOSIS — F32.9 REACTIVE DEPRESSION: ICD-10-CM

## 2017-08-14 DIAGNOSIS — I10 ESSENTIAL HYPERTENSION: ICD-10-CM

## 2017-08-15 LAB
BUN SERPL-MCNC: 14 MG/DL (ref 8–27)
BUN/CREAT SERPL: 19 (ref 12–28)
CALCIUM SERPL-MCNC: 9.7 MG/DL (ref 8.7–10.3)
CHLORIDE SERPL-SCNC: 102 MMOL/L (ref 96–106)
CO2 SERPL-SCNC: 25 MMOL/L (ref 18–29)
CREAT SERPL-MCNC: 0.72 MG/DL (ref 0.57–1)
EST. AVERAGE GLUCOSE BLD GHB EST-MCNC: 114 MG/DL
GLUCOSE SERPL-MCNC: 119 MG/DL (ref 65–99)
HBA1C MFR BLD: 5.6 % (ref 4.8–5.6)
POTASSIUM SERPL-SCNC: 4.6 MMOL/L (ref 3.5–5.2)
SODIUM SERPL-SCNC: 140 MMOL/L (ref 134–144)

## 2017-08-16 ENCOUNTER — OFFICE VISIT (OUTPATIENT)
Dept: INTERNAL MEDICINE CLINIC | Age: 62
End: 2017-08-16

## 2017-08-16 VITALS
HEIGHT: 69 IN | WEIGHT: 260 LBS | BODY MASS INDEX: 38.51 KG/M2 | TEMPERATURE: 97.9 F | RESPIRATION RATE: 16 BRPM | OXYGEN SATURATION: 96 % | HEART RATE: 68 BPM | SYSTOLIC BLOOD PRESSURE: 117 MMHG | DIASTOLIC BLOOD PRESSURE: 73 MMHG

## 2017-08-16 DIAGNOSIS — F32.9 REACTIVE DEPRESSION: ICD-10-CM

## 2017-08-16 DIAGNOSIS — Z00.00 PHYSICAL EXAM, ANNUAL: Primary | ICD-10-CM

## 2017-08-16 DIAGNOSIS — R73.03 PREDIABETES: ICD-10-CM

## 2017-08-16 DIAGNOSIS — I10 ESSENTIAL HYPERTENSION: ICD-10-CM

## 2017-08-16 NOTE — MR AVS SNAPSHOT
Visit Information Date & Time Provider Department Dept. Phone Encounter #  
 8/16/2017  3:00 PM Edgar Dimas, [address] [id number] Follow-up Instructions Return in about 4 months (around 12/16/2017). Your Appointments 8/16/2017  3:00 PM  
ROUTINE CARE with Edgar Dimas, [address] [facility] Appt Note: 3 month follow up  
 [facility] [address] [address]  
[address] [facility] [address] [place] [address] Upcoming Health Maintenance Date Due DTaP/Tdap/Td series (1 - Tdap) 3/11/1975 INFLUENZA AGE 9 TO ADULT 8/1/2017 HEMOGLOBIN A1C Q6M 11/11/2017 MICROALBUMIN Q1 2/15/2018 LIPID PANEL Q1 5/11/2018 FOOT EXAM Q1 5/17/2018 EYE EXAM RETINAL OR DILATED Q1 6/8/2018 PAP AKA CERVICAL CYTOLOGY 10/12/2018 BREAST CANCER SCRN MAMMOGRAM 7/13/2019 COLONOSCOPY 8/11/2026 Allergies as of 8/16/2017  Review Complete On: 5/17/2017 By: Edgar Dimas MD  
 No Known Allergies Current Immunizations  Never Reviewed Name Date Influenza Vaccine 10/10/2016, 9/22/2014 Influenza Vaccine Split 9/12/2012 Pneumococcal Polysaccharide (PPSV-23) 5/17/2017 Zoster Vaccine, Live 4/5/2015 Not reviewed this visit You Were Diagnosed With   
  
 Codes Comments Physical exam, annual    -  Primary ICD-10-CM: Z00.00 ICD-9-CM: V70.0 Essential hypertension     ICD-10-CM: I10 
ICD-9-CM: 401.9 Prediabetes     ICD-10-CM: R73.03 
ICD-9-CM: 790.29 Reactive depression     ICD-10-CM: F32.9 ICD-9-CM: 300.4 Vitals BP Pulse Temp Resp Height(growth percentile) Weight(growth percentile) 117/73 (BP 1 Location: Left arm, BP Patient Position: Sitting) 68 97.9 °F (36.6 °C) (Oral) 16 5' 9\" (1.753 m) 260 lb (117.9 kg) SpO2 BMI OB Status Smoking Status 96% 38.4 kg/m2 Postmenopausal Never Smoker Vitals History BMI and BSA Data Body Mass Index Body Surface Area  
 38.4 kg/m 2 2.4 m 2 Preferred Pharmacy Pharmacy Name [facility] [phone] - [phone], [address] [place] [address] [phone] Your Updated Medication List  
  
   
This list is accurate as of: 8/16/17  2:29 PM.  Always use your most recent med list.  
  
  
  
  
 aspirin delayed-release 81 mg tablet Commonly known as:  HDOHB-29 Take 1 Tab by mouth daily. Blood-Glucose Meter monitoring kit Commonly known as:  FREESTYLE LITE METER Daily  
  
 glucose blood VI test strips strip Commonly known as:  FREESTYLE LITE STRIPS  
2 per week  
  
 lisinopril-hydroCHLOROthiazide 10-12.5 mg per tablet Commonly known as:  [place] Take 1 Tab by mouth daily. metFORMIN  mg tablet Commonly known as:  GLUCOPHAGE XR Take 2 Tabs by mouth daily (with dinner). metoprolol tartrate 50 mg tablet Commonly known as:  LOPRESSOR Take 1 Tab by mouth two (2) times a day. pravastatin 20 mg tablet Commonly known as:  PRAVACHOL Take 1 Tab by mouth daily. sertraline 50 mg tablet Commonly known as:  ZOLOFT Take 1 Tab by mouth daily. Follow-up Instructions Return in about 4 months (around 12/16/2017). To-Do List   
 08/16/2017 Imaging:  DEXA BONE DENSITY STUDY AXIAL   
  
 08/23/2017 Lab:  CBC W/O DIFF   
  
 08/23/2017 Lab:  HEMOGLOBIN A1C WITH EAG   
  
 08/23/2017 Lab:  METABOLIC PANEL, COMPREHENSIVE   
  
 08/23/2017 Lab:  MICROALBUMIN, UR, RAND W/ MICROALBUMIN/CREA RATIO [facility] & HEALTH SERVICES! Dear Alexandrea: Thank you for requesting a Cylande account. Our records indicate that you already have an active Cylande account. You can access your account anytime at [email or web address] Did you know that you can access your hospital and ER discharge instructions at any time in [employer]? You can also review all of your test results from your hospital stay or ER visit. Additional Information If you have questions, please visit the Frequently Asked Questions section of the [employer] website at [email or web address] Remember, [employer] is NOT to be used for urgent needs. For medical emergencies, dial 911. Now available from your iPhone and Android! Please provide this summary of care documentation to your next provider. Your primary care clinician is listed as Alicia Zheng. If you have any questions after today's visit, please call [phone].

## 2017-08-16 NOTE — PROGRESS NOTES
HISTORY OF PRESENT ILLNESS  Alexandrea Haines is a 60 y.o. female. HPI   Last here 5/17/17. Pt is here to f/u on chronic conditions.     BP today is great- 117/73  BP at home running around   Continues metoprolol 50mg BID and lisinopril-HCTZ 10-12.5mg daily      BS at home running around 106, 116 in the AM, 130s-140s around bedtime  Continues metformin 500mg BID, this has been increased from once daily   Discussed I do not believe her a1c to actually be within normal ranges  Discussed with her readings I would suspect her a1c is really in 5.8-6.1 range but still in controlled ranges and still at goal   She declines GLP1 agonist for now    Pt c/o soreness to her BL elbows   She feels like she has lifted weights without doing so  She thinks this started after she increased her metformin   Discussed she could try decreasing back to one metformin per day to see if this helps  Pt plans to continue on her BID dosing of this    Continues pravachol 20mg daily for cholesterol- at goal in May     Reviewed last labs 8/17  a1c normal- 5.6, kidney nl, liver nl   Ordered labs to complete prior to follow up      Wt is up 5 lbs since last visit  She wonders about diet pills-addressed   Discussed GLP1 agonist helps with w/l-declines  Discussed diet and weight loss   Advised [employer] or [employer] Diet     Continues zoloft 50mg daily for depression, works well, happy with dose    Reviewed mammogram from 7/13/17: negative       PREVENTIVE:    Colonoscopy: 8/11/16, Dr. Nora Tomlinson, repeat 10 years  Pap: Dr. nAam Osman, 6/06/17  Mammogram: 7/13/17 negative  DEXA: 6/13 normal, repeat due, ordered, will schedule.  Reminded her   Tdap: 3/31/2014    Pneumovax: 5/17/2017  [id number]: not yet needed  Zostavax: 4/05/2015  Flu shot: 10/10/2016  Foot exam: 5/17/17  Microalbumin: 2/17 very minimal  A1c: 5/13 6.2, 5/14 5.9 , 6/14 6.1, 9/14 5.9, 1/15 6.4, 7/15 5.8, 1/15 6.1, 6/16 6.4, 10/16 6.0, 2/17 6.5, 5/17 6.2, 8/17 5.6-nl  Eye exam: Dr. Shadia Jenkins, 6/08/17   Hep C screen: 5/17 negative  Lipids: 5/17 LDL 67      Patient Active Problem List    Diagnosis Date Noted    Prediabetes 01/06/2016    Hypercholesterolemia     Hypertension     Glucose intolerance (impaired glucose tolerance)     Depression      Current Outpatient Prescriptions   Medication Sig Dispense Refill    Blood-Glucose Meter (FREESTYLE LITE METER) monitoring kit Daily 1 Kit 0    glucose blood VI test strips (FREESTYLE LITE STRIPS) strip 2 per week 50 Strip 1    sertraline (ZOLOFT) 50 mg tablet Take 1 Tab by mouth daily. 90 Tab 3    lisinopril-hydroCHLOROthiazide (PRINZIDE, ZESTORETIC) 10-12.5 mg per tablet Take 1 Tab by mouth daily. 30 Tab 6    pravastatin (PRAVACHOL) 20 mg tablet Take 1 Tab by mouth daily. 30 Tab 6    metoprolol tartrate (LOPRESSOR) 50 mg tablet Take 1 Tab by mouth two (2) times a day. 60 Tab 6    metFORMIN ER (GLUCOPHAGE XR) 500 mg tablet Take 2 Tabs by mouth daily (with dinner). 60 Tab 6    aspirin delayed-release (ASPIR-81) 81 mg tablet Take 1 Tab by mouth daily.  90 Tab 3     Past Surgical History:   Procedure Laterality Date    COLONOSCOPY N/A 8/11/2016    [facility]COPY    HX BREAST BIOPSY Left     years ago   benign    HX BREAST LUMPECTOMY      benign; left    US GUIDE ASP BREAST LT CYST W NDL Left     years ago  benign      Lab Results  Component Value Date/Time   WBC 7.3 02/14/2017 12:42 PM   HGB 13.7 02/14/2017 12:42 PM   HCT 41.7 02/14/2017 12:42 PM   PLATELET 818 97/39/9740 12:42 PM   MCV 89 02/14/2017 12:42 PM     Lab Results  Component Value Date/Time   Cholesterol, total 135 05/11/2017 09:09 AM   HDL Cholesterol 41 05/11/2017 09:09 AM   LDL, calculated 67 05/11/2017 09:09 AM   Triglyceride 133 05/11/2017 09:09 AM   CHOL/HDL Ratio 4.3 08/16/2010 01:49 PM     Lab Results  Component Value Date/Time   GFR est non-AA 89 08/14/2017 08:36 AM   GFR est  08/14/2017 08:36 AM   Creatinine 0.72 08/14/2017 08:36 AM   BUN 14 08/14/2017 08:36 AM   Sodium 140 08/14/2017 08:36 AM   Potassium 4.6 08/14/2017 08:36 AM   Chloride 102 08/14/2017 08:36 AM   CO2 25 08/14/2017 08:36 AM          Review of Systems   Respiratory: Negative for shortness of breath. Cardiovascular: Negative for chest pain. Physical Exam   Constitutional: She is oriented to person, place, and time. She appears well-developed and well-nourished. No distress. HENT:   Head: Normocephalic and atraumatic. Right Ear: External ear normal.   Left Ear: External ear normal.   Mouth/Throat: Oropharynx is clear and moist. No oropharyngeal exudate. Eyes: Conjunctivae and EOM are normal. Right eye exhibits no discharge. Left eye exhibits no discharge. Neck: Normal range of motion. Neck supple. No carotid bruits     Cardiovascular: Normal rate, regular rhythm, normal heart sounds and intact distal pulses. Exam reveals no gallop and no friction rub. No murmur heard. Pulmonary/Chest: Effort normal and breath sounds normal. No respiratory distress. She has no wheezes. She has no rales. She exhibits no tenderness. Abdominal: Soft. She exhibits no distension and no mass. There is no tenderness. There is no rebound and no guarding. Musculoskeletal: Normal range of motion. She exhibits edema (mild trace pitting BLE). She exhibits no tenderness or deformity. Lymphadenopathy:     She has no cervical adenopathy. Neurological: She is alert and oriented to person, place, and time. Coordination normal.   Skin: Skin is warm and dry. No rash noted. She is not diaphoretic. No erythema. No pallor. Psychiatric: She has a normal mood and affect. Her behavior is normal.       ASSESSMENT and PLAN    ICD-10-CM ICD-9-CM    1. Physical exam, annual    Discussed diet and weight loss, mammogram UTD, DEXA due and ordered, pelvic UTD, colonoscopy UTD, flu shot due next month, rest of her immunizations UTD, eye exam UTD, EKG completed last year.  Z00.00 V70.0 DEXA BONE DENSITY STUDY AXIAL      METABOLIC PANEL, COMPREHENSIVE      HEMOGLOBIN A1C WITH EAG      CBC W/O DIFF      MICROALBUMIN, UR, RAND W/ MICROALBUMIN/CREA RATIO   2. Essential hypertension    BP well controlled on metoprolol BID and lisinopril-HCTZ 10-12.5mg daily. Z13 960.5 METABOLIC PANEL, COMPREHENSIVE      HEMOGLOBIN A1C WITH EAG      CBC W/O DIFF      MICROALBUMIN, UR, RAND W/ MICROALBUMIN/CREA RATIO   3. Prediabetes    On 2 metformin daily to help with this, she is borderline diabetic. Discussed GLP1 agonist to assist with weight loss. She is not currently interested. Home readings and a1c stable. T90.82 909.50 METABOLIC PANEL, COMPREHENSIVE      HEMOGLOBIN A1C WITH EAG      CBC W/O DIFF      MICROALBUMIN, UR, RAND W/ MICROALBUMIN/CREA RATIO   4. Reactive depression    Controlled on zoloft 50mg  A95.4 675.6 METABOLIC PANEL, COMPREHENSIVE      HEMOGLOBIN A1C WITH EAG      CBC W/O DIFF      MICROALBUMIN, UR, RAND W/ MICROALBUMIN/CREA RATIO              Written by Perico Garcia, as dictated by Andrew Kimble MD.    Current diagnosis and concerns discussed with pt at length. Understands risks and benefits or current treatment plan and medications and accepts the treatment and medication with any possible risks.   Pt asks appropriate questions which were answered.   Pt instructed to call with any concerns or problems. This note will not be viewable in [address]

## 2017-09-19 RX ORDER — METFORMIN HYDROCHLORIDE 500 MG/1
1000 TABLET, EXTENDED RELEASE ORAL
Qty: 60 TAB | Refills: 6 | Status: SHIPPED | OUTPATIENT
Start: 2017-09-19 | End: 2018-02-22 | Stop reason: SDUPTHER

## 2017-09-19 NOTE — TELEPHONE ENCOUNTER
Requested Prescriptions     Pending Prescriptions Disp Refills    metFORMIN ER (GLUCOPHAGE XR) 500 mg tablet 60 Tab 6     Sig: Take 2 Tabs by mouth daily (with dinner).          Last Office Visit: 8.16.17    Upcoming Appointment: 12.13.17

## 2017-11-24 RX ORDER — SERTRALINE HYDROCHLORIDE 50 MG/1
50 TABLET, FILM COATED ORAL DAILY
Qty: 90 TAB | Refills: 3 | Status: SHIPPED | OUTPATIENT
Start: 2017-11-24 | End: 2018-12-17

## 2017-11-24 NOTE — TELEPHONE ENCOUNTER
Requested Prescriptions     Pending Prescriptions Disp Refills    sertraline (ZOLOFT) 50 mg tablet 90 Tab 3     Sig: Take 1 Tab by mouth daily.          Last Office Visit: 8.16.17    Upcoming Appointment:12.13.17

## 2017-11-28 RX ORDER — METOPROLOL TARTRATE 50 MG/1
50 TABLET ORAL 2 TIMES DAILY
Qty: 180 TAB | Refills: 1 | Status: SHIPPED | OUTPATIENT
Start: 2017-11-28 | End: 2018-04-19 | Stop reason: SDUPTHER

## 2017-11-28 NOTE — TELEPHONE ENCOUNTER
#[phone] [facility] requesting 90 day refill for the requested Metformin

## 2017-12-11 ENCOUNTER — APPOINTMENT (OUTPATIENT)
Dept: INTERNAL MEDICINE CLINIC | Age: 62
End: 2017-12-11

## 2017-12-11 DIAGNOSIS — I10 ESSENTIAL HYPERTENSION: ICD-10-CM

## 2017-12-11 DIAGNOSIS — R73.03 PREDIABETES: ICD-10-CM

## 2017-12-11 DIAGNOSIS — F32.9 REACTIVE DEPRESSION: ICD-10-CM

## 2017-12-11 DIAGNOSIS — Z00.00 PHYSICAL EXAM, ANNUAL: ICD-10-CM

## 2017-12-12 LAB
ALBUMIN SERPL-MCNC: 4.4 G/DL (ref 3.6–4.8)
ALBUMIN/CREAT UR: 22.5 MG/G CREAT (ref 0–30)
ALBUMIN/GLOB SERPL: 1.7 {RATIO} (ref 1.2–2.2)
ALP SERPL-CCNC: 69 IU/L (ref 39–117)
ALT SERPL-CCNC: 20 IU/L (ref 0–32)
AST SERPL-CCNC: 18 IU/L (ref 0–40)
BILIRUB SERPL-MCNC: 0.5 MG/DL (ref 0–1.2)
BUN SERPL-MCNC: 10 MG/DL (ref 8–27)
BUN/CREAT SERPL: 14 (ref 12–28)
CALCIUM SERPL-MCNC: 9.7 MG/DL (ref 8.7–10.3)
CHLORIDE SERPL-SCNC: 98 MMOL/L (ref 96–106)
CO2 SERPL-SCNC: 25 MMOL/L (ref 18–29)
CREAT SERPL-MCNC: 0.72 MG/DL (ref 0.57–1)
CREAT UR-MCNC: 193.7 MG/DL
ERYTHROCYTE [DISTWIDTH] IN BLOOD BY AUTOMATED COUNT: 13.3 % (ref 12.3–15.4)
EST. AVERAGE GLUCOSE BLD GHB EST-MCNC: 123 MG/DL
GFR SERPLBLD CREATININE-BSD FMLA CKD-EPI: 103 ML/MIN/1.73
GFR SERPLBLD CREATININE-BSD FMLA CKD-EPI: 89 ML/MIN/1.73
GLOBULIN SER CALC-MCNC: 2.6 G/DL (ref 1.5–4.5)
GLUCOSE SERPL-MCNC: 112 MG/DL (ref 65–99)
HBA1C MFR BLD: 5.9 % (ref 4.8–5.6)
HCT VFR BLD AUTO: 39.4 % (ref 34–46.6)
HGB BLD-MCNC: 13.3 G/DL (ref 11.1–15.9)
MCH RBC QN AUTO: 30.2 PG (ref 26.6–33)
MCHC RBC AUTO-ENTMCNC: 33.8 G/DL (ref 31.5–35.7)
MCV RBC AUTO: 90 FL (ref 79–97)
MICROALBUMIN UR-MCNC: 43.5 UG/ML
PLATELET # BLD AUTO: 253 X10E3/UL (ref 150–379)
POTASSIUM SERPL-SCNC: 4.1 MMOL/L (ref 3.5–5.2)
PROT SERPL-MCNC: 7 G/DL (ref 6–8.5)
RBC # BLD AUTO: 4.4 X10E6/UL (ref 3.77–5.28)
SODIUM SERPL-SCNC: 137 MMOL/L (ref 134–144)
WBC # BLD AUTO: 7.3 X10E3/UL (ref 3.4–10.8)

## 2017-12-13 ENCOUNTER — OFFICE VISIT (OUTPATIENT)
Dept: INTERNAL MEDICINE CLINIC | Age: 62
End: 2017-12-13

## 2017-12-13 VITALS
SYSTOLIC BLOOD PRESSURE: 119 MMHG | OXYGEN SATURATION: 96 % | HEIGHT: 69 IN | DIASTOLIC BLOOD PRESSURE: 69 MMHG | BODY MASS INDEX: 38.95 KG/M2 | WEIGHT: 263 LBS | HEART RATE: 66 BPM | RESPIRATION RATE: 16 BRPM | TEMPERATURE: 97.6 F

## 2017-12-13 DIAGNOSIS — F32.9 REACTIVE DEPRESSION: ICD-10-CM

## 2017-12-13 DIAGNOSIS — E66.9 OBESITY (BMI 35.0-39.9 WITHOUT COMORBIDITY): ICD-10-CM

## 2017-12-13 DIAGNOSIS — I10 ESSENTIAL HYPERTENSION: Primary | ICD-10-CM

## 2017-12-13 DIAGNOSIS — R73.03 PREDIABETES: ICD-10-CM

## 2017-12-13 DIAGNOSIS — E78.00 HYPERCHOLESTEROLEMIA: ICD-10-CM

## 2017-12-13 NOTE — PROGRESS NOTES
HISTORY OF PRESENT ILLNESS  Alexandrea Castillo is a 60 y.o. female. HPI   Last here 8/16/17.  Pt is here to f/u on chronic conditions.      BP today is 119/69  BP at home running around 120, 125/70s  Continues on metoprolol 50mg BID and lisinopril-HCTZ 10-12.5mg daily       BS at home running around 125, 117 in the AM fasting   Continues on metformin 500mg BID    Wt is up 3 lbs since last visit, up 10 lbs in the last year  Pt reports dietary indiscretion during Halloween and Thanksgiving  Pt has been drinking wine each night and wonders if it is high in sugar content - addressed   Pt has been eating out with her son  However, pt tries to eat a salad at those times  Pt plans on starting [facility] in 1/18  Pt does not drink anymore sodas and is trying to cut down on carbs  Advised her not to add salad dressing to her salads  Advised her to eat lean meats, fruits and vegetables  Advised her eat the carbs last  Discussed diet and weight loss       Reviewed last labs 12/17  Discussed her a1c not being entirely accurate   Discussed her a1c probably being in the diabetic range at this point in time    Continues on pravachol 20mg daily for cholesterol - at goal in May    Continues on zoloft 50mg daily for depression - works well, happy with dose  Pt reports some social stressors, as her son recently moved out of her home  Pt is worried about her son but now down/sad  However, no change to medication dose today     PREVENTIVE:    Colonoscopy: 8/11/16, Dr. Jacob Villagomez, repeat 10 years  Pap: Dr. Zoltan Aguilera, 6/06/17  Mammogram: 7/13/17, negative  DEXA: 6/13 normal, repeat due, ordered, will schedule, reminded   Tdap: 3/31/2014    Pneumovax: 5/17/2017  [id number]: not yet needed  Zostavax: 4/05/2015  Flu shot: 11/10/17  Foot exam: 5/17/17  Microalbumin: 12/17, minimal   A1c: 5/13 6.2, 5/14 5.9 , 6/14 6.1, 9/14 5.9, 1/15 6.4, 7/15 5.8, 1/15 6.1, 6/16 6.4, 10/16 6.0, 2/17 6.5, 5/17 6.2, 8/17 5.6, 12/17 5.9  Eye exam: Dr. Fercho Hutchins, 6/08/17 Hep C screen: 5/17, negative  Lipids: 5/17 LDL 67    Patient Active Problem List    Diagnosis Date Noted    Prediabetes 01/06/2016    Hypercholesterolemia     Hypertension     Glucose intolerance (impaired glucose tolerance)     Depression      Current Outpatient Prescriptions   Medication Sig Dispense Refill    metoprolol tartrate (LOPRESSOR) 50 mg tablet Take 1 Tab by mouth two (2) times a day. 180 Tab 1    sertraline (ZOLOFT) 50 mg tablet Take 1 Tab by mouth daily. 90 Tab 3    metFORMIN ER (GLUCOPHAGE XR) 500 mg tablet Take 2 Tabs by mouth daily (with dinner). 60 Tab 6    Blood-Glucose Meter (FREESTYLE LITE METER) monitoring kit Daily 1 Kit 0    glucose blood VI test strips (FREESTYLE LITE STRIPS) strip 2 per week 50 Strip 1    lisinopril-hydroCHLOROthiazide (PRINZIDE, ZESTORETIC) 10-12.5 mg per tablet Take 1 Tab by mouth daily. 30 Tab 6    pravastatin (PRAVACHOL) 20 mg tablet Take 1 Tab by mouth daily. 30 Tab 6    aspirin delayed-release (ASPIR-81) 81 mg tablet Take 1 Tab by mouth daily.  90 Tab 3     Past Surgical History:   Procedure Laterality Date    COLONOSCOPY N/A 8/11/2016    COL[facility]COPY performed by Corey [facility] MD at [facility] ENDOSCOPY    HX BREAST BIOPSY Left     years ago   benign    HX BREAST LUMPECTOMY      benign; left    US GUIDE ASP BREAST LT CYST W NDL Left     years ago  benign      Lab Results  Component Value Date/Time   WBC 7.3 12/11/2017 03:08 PM   HGB 13.3 12/11/2017 03:08 PM   HCT 39.4 12/11/2017 03:08 PM   PLATELET 766 33/06/7837 03:08 PM   MCV 90 12/11/2017 03:08 PM     Lab Results  Component Value Date/Time   Cholesterol, total 135 05/11/2017 09:09 AM   HDL Cholesterol 41 05/11/2017 09:09 AM   LDL, calculated 67 05/11/2017 09:09 AM   Triglyceride 133 05/11/2017 09:09 AM   CHOL/HDL Ratio 4.3 08/16/2010 01:49 PM     Lab Results  Component Value Date/Time   GFR est non-AA 89 12/11/2017 03:08 PM   GFR est  12/11/2017 03:08 PM   Creatinine 0.72 12/11/2017 03:08 PM BUN 10 12/11/2017 03:08 PM   Sodium 137 12/11/2017 03:08 PM   Potassium 4.1 12/11/2017 03:08 PM   Chloride 98 12/11/2017 03:08 PM   CO2 25 12/11/2017 03:08 PM          Review of Systems   Respiratory: Negative for shortness of breath. Cardiovascular: Negative for chest pain. Physical Exam   Constitutional: She is oriented to person, place, and time. She appears well-developed and well-nourished. No distress. HENT:   Head: Normocephalic and atraumatic. Mouth/Throat: Oropharynx is clear and moist. No oropharyngeal exudate. Tonsil stone on R   Eyes: Conjunctivae and EOM are normal. Right eye exhibits no discharge. Left eye exhibits no discharge. Neck: Normal range of motion. Neck supple. Cardiovascular: Normal rate, regular rhythm, normal heart sounds and intact distal pulses. Exam reveals no gallop and no friction rub. No murmur heard. Pulmonary/Chest: Effort normal and breath sounds normal. No respiratory distress. She has no wheezes. She has no rales. She exhibits no tenderness. Musculoskeletal: Normal range of motion. She exhibits edema (Tracce BLE). She exhibits no tenderness or deformity. Lymphadenopathy:     She has no cervical adenopathy. Neurological: She is alert and oriented to person, place, and time. Coordination normal.   Skin: Skin is warm and dry. No rash noted. She is not diaphoretic. No erythema. No pallor. Psychiatric: She has a normal mood and affect. Her behavior is normal.       ASSESSMENT and PLAN    ICD-10-CM ICD-9-CM    1. Essential hypertension    Controled on metoprolol BID and lisinopril-HCTZ, continue, no change to dose    F75 885.0 METABOLIC PANEL, BASIC      HEMOGLOBIN A1C WITH EAG      LIPID PANEL      TSH 3RD GENERATION   2. Reactive depression    Happy with zoloft 50mg, continue, no change to dose    O14.6 655.4 METABOLIC PANEL, BASIC      HEMOGLOBIN A1C WITH EAG      LIPID PANEL      TSH 3RD GENERATION   3.  Hypercholesterolemia    Controled on pravachol 20mg daily   D24.48 329.1 METABOLIC PANEL, BASIC      HEMOGLOBIN A1C WITH EAG      LIPID PANEL      TSH 3RD GENERATION   4. Prediabetes    On metformin 2 tabs per day, I suspect she is more in the diabetic range at this point in time as she has gained wt and has fasting readings in [id number] despite being on metformin, discussed this with her, for now continue metformin, continue to monitor BS at home   I76.87 368.05 METABOLIC PANEL, BASIC      HEMOGLOBIN A1C WITH EAG      LIPID PANEL      TSH 3RD GENERATION   5. Obesity (BMI 35.0-39.9 without comorbidity)    Wt continues to climb, she needs to work on portion control, she plans on starting [address] in the new year   T88.8 265.92 METABOLIC PANEL, BASIC      HEMOGLOBIN A1C WITH EAG      LIPID PANEL      TSH 3RD GENERATION        Scribed by Giulia Rajput of [address] [place] [address] [address], as dictated by Dr. Robert Constantino. Current diagnosis and concerns discussed with pt at length. Pt understands risks and benefits or current treatment plan and medications, and accepts the treatment and medication with any possible risks. Pt asks appropriate questions, which were answered. Pt was instructed to call with any concerns or problems. This note will not be viewable in [address]

## 2017-12-18 RX ORDER — PRAVASTATIN SODIUM 20 MG/1
TABLET ORAL
Qty: 30 TAB | Refills: 2 | Status: SHIPPED | OUTPATIENT
Start: 2017-12-18 | End: 2018-02-22 | Stop reason: SDUPTHER

## 2017-12-18 RX ORDER — LISINOPRIL AND HYDROCHLOROTHIAZIDE 10; 12.5 MG/1; MG/1
TABLET ORAL
Qty: 30 TAB | Refills: 2 | Status: SHIPPED | OUTPATIENT
Start: 2017-12-18 | End: 2018-02-22 | Stop reason: SDUPTHER

## 2018-02-22 RX ORDER — LISINOPRIL AND HYDROCHLOROTHIAZIDE 10; 12.5 MG/1; MG/1
1 TABLET ORAL DAILY
Qty: 90 TAB | Refills: 0 | Status: SHIPPED | OUTPATIENT
Start: 2018-02-22 | End: 2018-06-03 | Stop reason: SDUPTHER

## 2018-02-22 RX ORDER — METFORMIN HYDROCHLORIDE 500 MG/1
1000 TABLET, EXTENDED RELEASE ORAL
Qty: 180 TAB | Refills: 0 | Status: SHIPPED | OUTPATIENT
Start: 2018-02-22 | End: 2018-06-20 | Stop reason: SDUPTHER

## 2018-02-22 RX ORDER — PRAVASTATIN SODIUM 20 MG/1
20 TABLET ORAL DAILY
Qty: 90 TAB | Refills: 0 | Status: SHIPPED | OUTPATIENT
Start: 2018-02-22 | End: 2018-06-03 | Stop reason: SDUPTHER

## 2018-03-24 RX ORDER — BLOOD SUGAR DIAGNOSTIC
STRIP MISCELLANEOUS
Qty: 50 STRIP | Refills: 0 | Status: SHIPPED | OUTPATIENT
Start: 2018-03-24 | End: 2019-03-01

## 2018-04-11 ENCOUNTER — APPOINTMENT (OUTPATIENT)
Dept: INTERNAL MEDICINE CLINIC | Age: 63
End: 2018-04-11

## 2018-04-11 DIAGNOSIS — E78.00 HYPERCHOLESTEROLEMIA: ICD-10-CM

## 2018-04-11 DIAGNOSIS — R73.03 PREDIABETES: ICD-10-CM

## 2018-04-11 DIAGNOSIS — E66.9 OBESITY (BMI 35.0-39.9 WITHOUT COMORBIDITY): ICD-10-CM

## 2018-04-11 DIAGNOSIS — F32.9 REACTIVE DEPRESSION: ICD-10-CM

## 2018-04-11 DIAGNOSIS — I10 ESSENTIAL HYPERTENSION: ICD-10-CM

## 2018-04-12 LAB
BUN SERPL-MCNC: 14 MG/DL (ref 8–27)
BUN/CREAT SERPL: 19 (ref 12–28)
CALCIUM SERPL-MCNC: 10.1 MG/DL (ref 8.7–10.3)
CHLORIDE SERPL-SCNC: 99 MMOL/L (ref 96–106)
CHOLEST SERPL-MCNC: 128 MG/DL (ref 100–199)
CO2 SERPL-SCNC: 26 MMOL/L (ref 18–29)
CREAT SERPL-MCNC: 0.73 MG/DL (ref 0.57–1)
EST. AVERAGE GLUCOSE BLD GHB EST-MCNC: 126 MG/DL
GFR SERPLBLD CREATININE-BSD FMLA CKD-EPI: 101 ML/MIN/1.73
GFR SERPLBLD CREATININE-BSD FMLA CKD-EPI: 87 ML/MIN/1.73
GLUCOSE SERPL-MCNC: 91 MG/DL (ref 65–99)
HBA1C MFR BLD: 6 % (ref 4.8–5.6)
HDLC SERPL-MCNC: 42 MG/DL
LDLC SERPL CALC-MCNC: 52 MG/DL (ref 0–99)
POTASSIUM SERPL-SCNC: 4.6 MMOL/L (ref 3.5–5.2)
SODIUM SERPL-SCNC: 141 MMOL/L (ref 134–144)
TRIGL SERPL-MCNC: 172 MG/DL (ref 0–149)
TSH SERPL DL<=0.005 MIU/L-ACNC: 0.42 UIU/ML (ref 0.45–4.5)
VLDLC SERPL CALC-MCNC: 34 MG/DL (ref 5–40)

## 2018-04-17 ENCOUNTER — OFFICE VISIT (OUTPATIENT)
Dept: INTERNAL MEDICINE CLINIC | Age: 63
End: 2018-04-17

## 2018-04-17 VITALS
RESPIRATION RATE: 16 BRPM | DIASTOLIC BLOOD PRESSURE: 84 MMHG | OXYGEN SATURATION: 96 % | HEART RATE: 60 BPM | HEIGHT: 69 IN | SYSTOLIC BLOOD PRESSURE: 152 MMHG | BODY MASS INDEX: 39.1 KG/M2 | TEMPERATURE: 97.6 F | WEIGHT: 264 LBS

## 2018-04-17 DIAGNOSIS — R73.02 GLUCOSE INTOLERANCE (IMPAIRED GLUCOSE TOLERANCE): ICD-10-CM

## 2018-04-17 DIAGNOSIS — E78.00 HYPERCHOLESTEROLEMIA: ICD-10-CM

## 2018-04-17 DIAGNOSIS — E05.90 HYPERTHYROIDISM, SUBCLINICAL: Primary | ICD-10-CM

## 2018-04-17 DIAGNOSIS — F32.9 REACTIVE DEPRESSION: ICD-10-CM

## 2018-04-17 DIAGNOSIS — E66.01 SEVERE OBESITY (BMI 35.0-39.9) WITH COMORBIDITY (HCC): ICD-10-CM

## 2018-04-17 NOTE — PATIENT INSTRUCTIONS
Taper off zoloft to every other day for 2 weeks then 3 days per week for 2 weeks then stop     Repeat lab today

## 2018-04-17 NOTE — MR AVS SNAPSHOT
[address] [place] [address] 
[phone] Patient: Colonel Calhoun MRN: [id number] OIY:[id number] Visit Information Date & Time Provider Department Dept. Phone Encounter #  
 4/17/2018  3:00 PM Ashley Mcpherson [address] [place] [address],[address] [phone] [id number] Follow-up Instructions Return in about 4 months (around 8/17/2018). Upcoming Health Maintenance Date Due  
 FOOT EXAM Q1 5/17/2018 EYE EXAM RETINAL OR DILATED Q1 6/8/2018 HEMOGLOBIN A1C Q6M 10/11/2018 PAP AKA CERVICAL CYTOLOGY 10/12/2018 MICROALBUMIN Q1 12/11/2018 LIPID PANEL Q1 4/11/2019 BREAST CANCER SCRN MAMMOGRAM 7/13/2019 DTaP/Tdap/Td series (2 - Td) 3/10/2024 COLONOSCOPY 8/11/2026 Allergies as of 4/17/2018  Review Complete On: 4/17/2018 By: Suzette Adams LPN No Known Allergies Current Immunizations  Never Reviewed Name Date Influenza Vaccine 11/10/2017, 10/10/2016, 9/22/2014 Influenza Vaccine Split 9/12/2012 Pneumococcal Polysaccharide (PPSV-23) 5/17/2017 Tdap 3/10/2014 Zoster Vaccine, Live 4/5/2015 Not reviewed this visit You Were Diagnosed With   
  
 Codes Comments Hyperthyroidism, subclinical    -  Primary ICD-10-CM: E05.90 ICD-9-CM: 242.90 Glucose intolerance (impaired glucose tolerance)     ICD-10-CM: R73.02 
ICD-9-CM: 790.22 Reactive depression     ICD-10-CM: F32.9 ICD-9-CM: 300.4 Hypercholesterolemia     ICD-10-CM: E78.00 ICD-9-CM: 272.0 Severe obesity (BMI 35.0-39. 9) with comorbidity ([address]     ICD-10-CM: E66.01 
ICD-9-CM: 278.01 Vitals BP Pulse Temp Resp Height(growth percentile) Weight(growth percentile) 152/84 (BP 1 Location: Left arm, BP Patient Position: Sitting) 60 97.6 °F (36.4 °C) (Oral) 16 5' 9\" (1.753 m) 264 lb (119.7 kg) SpO2 BMI OB Status Smoking Status 96% 38.99 kg/m2 Postmenopausal Never Smoker Vitals History BMI and BSA Data Body Mass Index Body Surface Area  
 38.99 kg/m 2 2.41 m 2 Preferred Pharmacy Pharmacy Name Phone [id number] [facility] [phone] Your Updated Medication List  
  
   
This list is accurate as of 4/17/18  3:26 PM.  Always use your most recent med list.  
  
  
  
  
 aspirin delayed-release 81 mg tablet Commonly known as:  CWDDN-29 Take 1 Tab by mouth daily. Blood-Glucose Meter monitoring kit Commonly known as:  FREESTYLE LITE METER Daily  
  
 lisinopril-hydroCHLOROthiazide 10-12.5 mg per tablet Commonly known as:  [place] Take 1 Tab by mouth daily. metFORMIN  mg tablet Commonly known as:  GLUCOPHAGE XR Take 2 Tabs by mouth daily (with dinner). metoprolol tartrate 50 mg tablet Commonly known as:  LOPRESSOR Take 1 Tab by mouth two (2) times a day. ONETOUCH ULTRA TEST strip Generic drug:  glucose blood VI test strips TEST BLOOD SUGAR TWICE WEEKLY  
  
 pravastatin 20 mg tablet Commonly known as:  PRAVACHOL Take 1 Tab by mouth daily. sertraline 50 mg tablet Commonly known as:  ZOLOFT Take 1 Tab by mouth daily. We Performed the Following  DIABETES FOOT EXAM [7 Custom] T4, FREE [id number] CPT(R)] TSH 3RD GENERATION [47398 CPT(R)] Follow-up Instructions Return in about 4 months (around 8/17/2018). Patient Instructions Taper off zoloft to every other day for 2 weeks then 3 days per week for 2 weeks then stop Repeat lab today Introducing [facility] SERVICES! Dear Alexandrea: Thank you for requesting a [email or web address] account. Our records indicate that you already have an active [email or web address] account. You can access your account anytime at [email or web address] Did you know that you can access your hospital and ER discharge instructions at any time in [employer]? You can also review all of your test results from your hospital stay or ER visit. Additional Information If you have questions, please visit the Frequently Asked Questions section of the [employer] website at [email or web address] Remember, [employer] is NOT to be used for urgent needs. For medical emergencies, dial 911. Now available from your iPhone and Android! Please provide this summary of care documentation to your next provider. Your primary care clinician is listed as Shawna Covington. If you have any questions after today's visit, please call [phone].

## 2018-04-17 NOTE — PROGRESS NOTES
HISTORY OF PRESENT ILLNESS  Alexandrea Pierson is a 58 y.o. female. HPI   Last here 12/13/17. Pt is here to f/u on chronic conditions.      BP is 152/84, will repeat this today   BPs are 120/60-70 at home  Continues on metoprolol 50mg BID and lisinopril-HCTZ 10-12.5mg daily   Pt took these meds today      BS at home running around 140, 138, 100 in the AM fasting   BS at home running around 140s (mostly), 180 at night  Continues on metformin 500mg BID     Wt is stable x lov, up 10 lbs in 1 year  She has been walking and completing exercises per aerobic tapes  She has been stress-eating   Lov, she stated that she would enroll in [facility] [place]  However, she has not yet enrolled in [facility] [place]  Discussed enrolling in [facility] [place]   She will come up with an action plan before nov      Reviewed last labs 4/18: thyroid running too fast  Will get labs today      Continues on pravachol 20mg daily for cholesterol - at goal last check     Continues on zoloft 50mg daily for depression    She was started on this med, as she cried about her son in a dr's office  She wonders if she must continue on this med  She believes that she can stop this med now  Not sad or down, in a better place  However, she may be worried that her mother is in hospice care  Discussed taking zoloft every other day for 2 weeks and then, stopping    ACP not on file. SDMs are her  Kinza De Leon) and son Jluis Garcia).   Provided information today.       PREVENTIVE:    Colonoscopy: 8/11/16, Dr. Marci Wilson, repeat 10 years  Pap: Dr. Angelica Haile, 6/06/17  Mammogram: 7/13/17, negative, due 7/18  DEXA: 6/13, nl, repeat due, ordered, will schedule, reminded, will try to schedule with mammo for 7/18  Tdap: 3/31/2014    Pneumovax: 5/17/2017  [id number]: not yet needed  Zostavax: 4/05/2015  Flu shot: 11/10/17  Foot exam: 04/17/18   Microalbumin: 12/17, minimal   A1c: 5/13 6.2, 5/14 5.9, 6/14 6.1, 9/14 5.9, 1/15 6.4, 7/15 5.8, 1/15 6.1, 6/16 6.4, 10/16 6.0, 2/17 6.5, 5/17 6.2, 8/17 5.6, 12/17 5.9, 4/18 6.0  Eye exam: Dr. Elyse Weiner, 6/08/17, due 6/18  Hep C screen: 5/17, negative  Lipids: 4/18 LDL 52    Patient Active Problem List    Diagnosis Date Noted    Obesity (BMI 35.0-39.9 without comorbidity) 12/13/2017    Prediabetes 01/06/2016    Hypercholesterolemia     Hypertension     Glucose intolerance (impaired glucose tolerance)     Depression      Current Outpatient Prescriptions   Medication Sig Dispense Refill    ONETOUCH ULTRA TEST strip TEST BLOOD SUGAR TWICE WEEKLY 50 Strip 0    metFORMIN ER (GLUCOPHAGE XR) 500 mg tablet Take 2 Tabs by mouth daily (with dinner). 180 Tab 0    lisinopril-hydroCHLOROthiazide (PRINZIDE, ZESTORETIC) 10-12.5 mg per tablet Take 1 Tab by mouth daily. 90 Tab 0    pravastatin (PRAVACHOL) 20 mg tablet Take 1 Tab by mouth daily. 90 Tab 0    metoprolol tartrate (LOPRESSOR) 50 mg tablet Take 1 Tab by mouth two (2) times a day. 180 Tab 1    sertraline (ZOLOFT) 50 mg tablet Take 1 Tab by mouth daily. 90 Tab 3    Blood-Glucose Meter (FREESTYLE LITE METER) monitoring kit Daily 1 Kit 0    aspirin delayed-release (ASPIR-81) 81 mg tablet Take 1 Tab by mouth daily.  90 Tab 3     Past Surgical History:   Procedure Laterality Date    COLONOSCOPY N/A 8/11/2016    COLONOS[facility] L[facility]orden [facility] at [facility] END[facility]COPY    HX BREAST BIOPSY Left     years ago   benign    HX BREAST LUMPECTOMY      benign; left    US GUIDE ASP BREAST LT CYST W NDL Left     years ago  benign      Lab Results  Component Value Date/Time   WBC 7.3 12/11/2017 03:08 PM   HGB 13.3 12/11/2017 03:08 PM   HCT 39.4 12/11/2017 03:08 PM   PLATELET 480 30/21/2061 03:08 PM   MCV 90 12/11/2017 03:08 PM     Lab Results  Component Value Date/Time   Cholesterol, total 128 04/11/2018 01:33 PM   HDL Cholesterol 42 04/11/2018 01:33 PM   LDL, calculated 52 04/11/2018 01:33 PM   Triglyceride 172 (H) 04/11/2018 01:33 PM   CHOL/HDL Ratio 4.3 08/16/2010 01:49 PM     Lab Results  Component Value Date/Time GFR est non-AA 87 04/11/2018 01:33 PM   GFR est  04/11/2018 01:33 PM   Creatinine 0.73 04/11/2018 01:33 PM   BUN 14 04/11/2018 01:33 PM   Sodium 141 04/11/2018 01:33 PM   Potassium 4.6 04/11/2018 01:33 PM   Chloride 99 04/11/2018 01:33 PM   CO2 26 04/11/2018 01:33 PM        Review of Systems   Respiratory: Negative for shortness of breath. Cardiovascular: Negative for chest pain. Physical Exam   Constitutional: She is oriented to person, place, and time. She appears well-developed and well-nourished. No distress. HENT:   Head: Normocephalic and atraumatic. Mouth/Throat: Oropharynx is clear and moist. No oropharyngeal exudate. Eyes: Conjunctivae and EOM are normal. Right eye exhibits no discharge. Left eye exhibits no discharge. Neck: Normal range of motion. Neck supple. Cardiovascular: Normal rate, regular rhythm and normal heart sounds. Exam reveals no gallop and no friction rub. No murmur heard. Pulmonary/Chest: Effort normal and breath sounds normal. No respiratory distress. She has no wheezes. She has no rales. She exhibits no tenderness. Musculoskeletal: Normal range of motion. She exhibits edema (Mild BLE). She exhibits no tenderness or deformity. Lymphadenopathy:     She has no cervical adenopathy. Neurological: She is alert and oriented to person, place, and time. Coordination normal.   Monofilament nl BLE, good peripheral pulses, no ulcers      Skin: Skin is warm and dry. No rash noted. She is not diaphoretic. No erythema. No pallor. Stasis color changes BLE   Psychiatric: She has a normal mood and affect. Her behavior is normal.       ASSESSMENT and PLAN    ICD-10-CM ICD-9-CM    1.  Hyperthyroidism, subclinical    Check TFTs to see if there is a thyroid disorder that needs to be treated   E05.90 242.90 TSH 3RD GENERATION      T4, FREE   2. Glucose intolerance (impaired glucose tolerance)    a1c stable, continue on metformin, eye exam will be due this Summer   R73.02 790.22  DIABETES FOOT EXAM   3. Reactive depression    Doing quite well, sx have been controled, would like to taper off zoloft, discussed how to taper off   F32.9 300.4    4. Hypercholesterolemia    Controled on pravachol, TGs minimally elevated, work on w/l   E78.00 272.0    5. Severe obesity (BMI 35.0-39. 9) with comorbidity ([address]    Wt not improved, she has not done anything to further improve w/l, discussed [employer] again, she states she will go ahead and look into this and come up with an action plan before nov E66.01 278.01         Scribed by Silvia Valente of [facility], as dictated by Dr. Shereen Kiser. Current diagnosis and concerns discussed with pt at length. Pt understands risks and benefits or current treatment plan and medications, and accepts the treatment and medication with any possible risks. Pt asks appropriate questions, which were answered. Pt was instructed to call with any concerns or problems. This note will not be viewable in [address]

## 2018-04-18 LAB
T4 FREE SERPL-MCNC: 0.98 NG/DL (ref 0.82–1.77)
TSH SERPL DL<=0.005 MIU/L-ACNC: 0.52 UIU/ML (ref 0.45–4.5)

## 2018-04-19 RX ORDER — METOPROLOL TARTRATE 50 MG/1
50 TABLET ORAL 2 TIMES DAILY
Qty: 180 TAB | Refills: 1 | Status: SHIPPED | OUTPATIENT
Start: 2018-04-19 | End: 2018-06-18 | Stop reason: SDUPTHER

## 2018-04-19 NOTE — TELEPHONE ENCOUNTER
PCP: Shawna Covington MD    Last appt: 4/17/2018  No future appointments. Requested Prescriptions     Pending Prescriptions Disp Refills    metoprolol tartrate (LOPRESSOR) 50 mg tablet 180 Tab 1     Sig: Take 1 Tab by mouth two (2) times a day.

## 2018-06-18 RX ORDER — METOPROLOL TARTRATE 50 MG/1
50 TABLET ORAL 2 TIMES DAILY
Qty: 60 TAB | Refills: 0 | Status: SHIPPED | OUTPATIENT
Start: 2018-06-18 | End: 2018-07-23 | Stop reason: SDUPTHER

## 2018-06-18 NOTE — TELEPHONE ENCOUNTER
PCP: Alona Newman MD    Last appt: 4/17/2018  No future appointments. Requested Prescriptions     Pending Prescriptions Disp Refills    metoprolol tartrate (LOPRESSOR) 50 mg tablet 180 Tab 1     Sig: Take 1 Tab by mouth two (2) times a day.

## 2018-06-21 RX ORDER — METFORMIN HYDROCHLORIDE 500 MG/1
TABLET, EXTENDED RELEASE ORAL
Qty: 180 TAB | Refills: 0 | Status: SHIPPED | OUTPATIENT
Start: 2018-06-21 | End: 2018-09-16 | Stop reason: SDUPTHER

## 2018-07-23 RX ORDER — METOPROLOL TARTRATE 50 MG/1
TABLET ORAL
Qty: 60 TAB | Refills: 0 | Status: SHIPPED | OUTPATIENT
Start: 2018-07-23 | End: 2018-10-25 | Stop reason: SDUPTHER

## 2018-07-25 ENCOUNTER — HOSPITAL ENCOUNTER (OUTPATIENT)
Dept: MAMMOGRAPHY | Age: 63
Discharge: HOME | End: 2018-07-25
Attending: INTERNAL MEDICINE
Payer: COMMERCIAL

## 2018-07-25 DIAGNOSIS — Z12.39 SCREENING BREAST EXAMINATION: ICD-10-CM

## 2018-07-25 DIAGNOSIS — Z00.00 PHYSICAL EXAM, ANNUAL: ICD-10-CM

## 2018-07-25 PROCEDURE — 77067 SCR MAMMO BI INCL CAD: CPT

## 2018-07-25 PROCEDURE — 77080 DXA BONE DENSITY AXIAL: CPT

## 2018-08-28 ENCOUNTER — APPOINTMENT (OUTPATIENT)
Dept: INTERNAL MEDICINE CLINIC | Age: 63
End: 2018-08-28

## 2018-08-28 DIAGNOSIS — I10 ESSENTIAL HYPERTENSION: ICD-10-CM

## 2018-08-28 DIAGNOSIS — R73.01 IFG (IMPAIRED FASTING GLUCOSE): Primary | ICD-10-CM

## 2018-08-28 DIAGNOSIS — Z13.29 SCREENING FOR THYROID DISORDER: ICD-10-CM

## 2018-08-29 LAB
BUN SERPL-MCNC: 14 MG/DL (ref 8–27)
BUN/CREAT SERPL: 18 (ref 12–28)
CALCIUM SERPL-MCNC: 10.1 MG/DL (ref 8.7–10.3)
CHLORIDE SERPL-SCNC: 102 MMOL/L (ref 96–106)
CO2 SERPL-SCNC: 24 MMOL/L (ref 20–29)
CREAT SERPL-MCNC: 0.77 MG/DL (ref 0.57–1)
EST. AVERAGE GLUCOSE BLD GHB EST-MCNC: 123 MG/DL
GLUCOSE SERPL-MCNC: 104 MG/DL (ref 65–99)
HBA1C MFR BLD: 5.9 % (ref 4.8–5.6)
POTASSIUM SERPL-SCNC: 4.5 MMOL/L (ref 3.5–5.2)
SODIUM SERPL-SCNC: 141 MMOL/L (ref 134–144)
TSH SERPL DL<=0.005 MIU/L-ACNC: 0.69 UIU/ML (ref 0.45–4.5)

## 2018-08-30 ENCOUNTER — OFFICE VISIT (OUTPATIENT)
Dept: INTERNAL MEDICINE CLINIC | Age: 63
End: 2018-08-30

## 2018-08-30 VITALS
HEIGHT: 69 IN | HEART RATE: 70 BPM | TEMPERATURE: 98 F | OXYGEN SATURATION: 98 % | BODY MASS INDEX: 38.66 KG/M2 | RESPIRATION RATE: 16 BRPM | WEIGHT: 261 LBS | SYSTOLIC BLOOD PRESSURE: 119 MMHG | DIASTOLIC BLOOD PRESSURE: 77 MMHG

## 2018-08-30 DIAGNOSIS — Z00.00 PHYSICAL EXAM: Primary | ICD-10-CM

## 2018-08-30 DIAGNOSIS — Z23 ENCOUNTER FOR IMMUNIZATION: ICD-10-CM

## 2018-08-30 DIAGNOSIS — E78.00 HYPERCHOLESTEROLEMIA: ICD-10-CM

## 2018-08-30 DIAGNOSIS — I10 ESSENTIAL HYPERTENSION: ICD-10-CM

## 2018-08-30 DIAGNOSIS — E11.9 DIABETES MELLITUS WITHOUT COMPLICATION (HCC): ICD-10-CM

## 2018-08-30 DIAGNOSIS — E66.01 SEVERE OBESITY (BMI 35.0-39.9) WITH COMORBIDITY (HCC): ICD-10-CM

## 2018-08-30 DIAGNOSIS — F32.9 REACTIVE DEPRESSION: ICD-10-CM

## 2018-08-30 RX ORDER — PRAVASTATIN SODIUM 20 MG/1
TABLET ORAL
Qty: 90 TAB | Refills: 0 | Status: SHIPPED | OUTPATIENT
Start: 2018-08-30 | End: 2018-11-30 | Stop reason: SDUPTHER

## 2018-08-30 RX ORDER — LISINOPRIL AND HYDROCHLOROTHIAZIDE 10; 12.5 MG/1; MG/1
TABLET ORAL
Qty: 90 TAB | Refills: 0 | Status: SHIPPED | OUTPATIENT
Start: 2018-08-30 | End: 2018-11-30 | Stop reason: SDUPTHER

## 2018-08-30 NOTE — MR AVS SNAPSHOT
[address] [place] [address] 
[phone] Patient: Waldemar Ward MRN: [id number] CDY:[birth year] Visit Information Date & Time Provider Department Dept. Phone Encounter #  
 8/30/2018  8:30 AM Rhoderick Romberg, [address] [facility] [phone] [id number] Follow-up Instructions Return in about 4 months (around 12/30/2018). Upcoming Health Maintenance Date Due Influenza Age 5 to Adult 8/1/2018 PAP AKA CERVICAL CYTOLOGY 6/6/2020 BREAST CANCER SCRN MAMMOGRAM 7/25/2020 DTaP/Tdap/Td series (2 - Td) 3/10/2024 COLONOSCOPY 8/11/2026 Allergies as of 8/30/2018  Review Complete On: 8/30/2018 By: Rhoderick Romberg, MD  
 No Known Allergies Current Immunizations  Never Reviewed Name Date Influenza Vaccine 11/10/2017, 10/10/2016, 9/22/2014 Influenza Vaccine Split 9/12/2012 Pneumococcal Polysaccharide (PPSV-23) 5/17/2017 Tdap 3/10/2014 Zoster Vaccine, Live 4/5/2015 Not reviewed this visit You Were Diagnosed With   
  
 Codes Comments Physical exam    -  Primary ICD-10-CM: Z00.00 ICD-9-CM: V70.9 Essential hypertension     ICD-10-CM: I10 
ICD-9-CM: 401.9 Severe obesity (BMI 35.0-39. 9) with comorbidity ([facility]     ICD-10-CM: E66.01 
ICD-9-CM: 278.01 Hypercholesterolemia     ICD-10-CM: E78.00 ICD-9-CM: 272.0 Reactive depression     ICD-10-CM: F32.9 ICD-9-CM: 300.4 Diabetes mellitus without complication ([facility]     MVZ-64-IZ: E11.9 ICD-9-CM: 250.00 Vitals BP Pulse Temp Resp Height(growth percentile) Weight(growth percentile) 144/85 (BP 1 Location: Left arm, BP Patient Position: Sitting) 70 98 °F (36.7 °C) (Oral) 16 5' 9\" (1.753 m) 261 lb (118.4 kg) SpO2 BMI OB Status Smoking Status 98% 38.54 kg/m2 Postmenopausal Never Smoker Vitals History BMI and BSA Data Body Mass Index Body Surface Area  38.54 kg/m 2 2.4 m 2  
 Preferred Pharmacy Pharmacy Name Phone [id number] [facility] [phone] Your Updated Medication List  
  
   
This list is accurate as of 18  8:54 AM.  Always use your most recent med list.  
  
  
  
  
 aspirin delayed-release 81 mg tablet Commonly known as:  AMIUL-88 Take 1 Tab by mouth daily. Blood-Glucose Meter monitoring kit Commonly known as:  FREESTYLE LITE METER Daily  
  
 lisinopril-hydroCHLOROthiazide 10-12.5 mg per tablet Commonly known as:  PRINZIDE, ZESTORETIC  
TAKE 1 TAB BY MOUTH DAILY. metFORMIN  mg tablet Commonly known as:  GLUCOPHAGE XR  
TAKE 2 TABS BY MOUTH DAILY (WITH DINNER). metoprolol tartrate 50 mg tablet Commonly known as:  LOPRESSOR  
TAKE 1 TABLET BY MOUTH TWICE A DAY  
  
 ONETOUCH ULTRA TEST strip Generic drug:  glucose blood VI test strips TEST BLOOD SUGAR TWICE WEEKLY  
  
 pravastatin 20 mg tablet Commonly known as:  PRAVACHOL  
TAKE 1 TAB BY MOUTH DAILY. sertraline 50 mg tablet Commonly known as:  ZOLOFT Take 1 Tab by mouth daily. varicella-zoster recombinant (PF) 50 mcg/0.5 mL Susr injection Commonly known as:  SHINGRIX (PF)  
0.5 mL by IntraMUSCular route once for 1 dose. Prescriptions Printed Refills  
 varicella-zoster recombinant, PF, (SHINGRIX, PF,) 50 mcg/0.5 mL susr injection 1 Si.5 mL by IntraMUSCular route once for 1 dose. Class: Print Route: IntraMUSCular Follow-up Instructions Return in about 4 months (around 2018). To-Do List   
 2018 Lab:  CBC W/O DIFF   
  
 2018 Lab:  HEMOGLOBIN A1C WITH EAG   
  
 2018 Lab:  METABOLIC PANEL, COMPREHENSIVE   
  
 2018 Lab:  MICROALBUMIN, UR, RAND W/ MICROALB/CREAT RATIO Patient Instructions Schedule eye exam  
 
 
  
Introducing \[facility] [place] [facility]\"" & HEALTH SERVICES! Dear Alexandrea: Thank you for requesting a [employer] account. Our records indicate that you already have an active [employer] account. You can access your account anytime at [email or web address]. "[email or web address] Did you know that you can access your hospital and ER discharge instructions at any time in [employer]? You can also review all of your test results from your hospital stay or ER visit. Additional Information If you have questions, please visit the Frequently Asked Questions section of the [employer] website at [email or web address] "[email or web address]/. Remember, [employer] is NOT to be used for urgent needs. For medical emergencies, dial 911. Now available from your iPhone and Android! Please provide this summary of care documentation to your next provider. Your primary care clinician is listed as Magaly Fuller. If you have any questions after today's visit, please call [phone].

## 2018-08-30 NOTE — PROGRESS NOTES
HISTORY OF PRESENT ILLNESS Alexandrea Phillip is a 58 y.o. female. HPI Last here 4/17/18. Pt is here to f/u on chronic conditions. 
   
BP is 144/85, will repeat this today Continues on lisinopril-HCTZ 10-12.5mg daily Pt has been taking metoprolol 25mg BID through a misunderstanding Will have her take the correct dose of metoprolol 50mg BID 
   
BS at home running around 119, 120, 115 in the AM fasting BS at home running around 145 before bed Continues on metformin 500mg BID 
   
Wt is down 3 lbs x lov Pt is now doing [employer] However her house has currently been torn up so she has not been eating at home as much She will get back on track soon 
   
Reviewed last labs 8/18 Will get labs today  
   
Continues on pravachol 20mg daily for cholesterol  
   
Since lov, pt has weaned off of zoloft 50mg Pt believes she is doing well without meds at this time Reviewed DEXA 7/18: nl 
 
Reviewed mammo 7/18: nl 
 
Pt had a zostavax vaccine in 2015 Discussed shingrix being a dead vaccine Discussed it being more effective than zostavax (92% effective) Discussed it being a 2 part series Ordered 08/30/18  
  
ACP not on file. SDMs are her  Rachel Gurdeep) and son Christian Zamorano). Provided information today.  
   
PREVENTIVE:   
Colonoscopy: 8/11/16, Dr. Alissa Noble, repeat 10 years Pap: Dr. Carolina Rivera, 6/06/17 Mammogram: 7/25/18, negative DEXA: 7/25/18, nl 
Tdap: 3/31/2014   
Pneumovax: 5/17/2017 [id number]: not yet needed Zostavax: 4/05/2015 Shingrix: ordered 08/30/18 Flu shot: 08/30/18 Foot exam: 04/17/18 Microalbumin: 12/17, minimal  
A1c: , 9/14 5.9, 1/15 6.4, 7/15 5.8, 1/15 6.1, 6/16 6.4, 10/16 6.0, 2/17 6.5, 5/17 6.2, 8/17 5.6, 12/17 5.9, 4/18 6.0, 8/18 5.9 Eye exam: Dr. Toma Hashimoto, 6/08/17, due, will schedule Hep C screen: 5/17, negative Lipids: 4/18 LDL 52 Patient Active Problem List  
 Diagnosis Date Noted  Severe obesity (BMI 35.0-39. 9) with comorbidity ([facility] 04/17/2018  Obesity (BMI 35.0-39.9 without comorbidity) 12/13/2017  Prediabetes 01/06/2016  Hypercholesterolemia  Hypertension  Glucose intolerance (impaired glucose tolerance)  Depression Current Outpatient Prescriptions Medication Sig Dispense Refill  pravastatin (PRAVACHOL) 20 mg tablet TAKE 1 TAB BY MOUTH DAILY. 90 Tab 0  
 lisinopril-hydroCHLOROthiazide (PRINZIDE, ZESTORETIC) 10-12.5 mg per tablet TAKE 1 TAB BY MOUTH DAILY. 90 Tab 0  
 metoprolol tartrate (LOPRESSOR) 50 mg tablet TAKE 1 TABLET BY MOUTH TWICE A DAY 60 Tab 0  
 metFORMIN ER (GLUCOPHAGE XR) 500 mg tablet TAKE 2 TABS BY MOUTH DAILY (WITH DINNER). 180 Tab 0  
 ONETOUCH ULTRA TEST strip TEST BLOOD SUGAR TWICE WEEKLY 50 Strip 0  Blood-Glucose Meter (FREESTYLE LITE METER) monitoring kit Daily 1 Kit 0  
 aspirin delayed-release (ASPIR-81) 81 mg tablet Take 1 Tab by mouth daily. 90 Tab 3  
 sertraline (ZOLOFT) 50 mg tablet Take 1 Tab by mouth daily. 90 Tab 3 Past Surgical History:  
Procedure Laterality Date  COLONOSCOPY N/A 8/11/2016 COLONOSCOPY performed by Warren Sands MD at [facility] ENDOSCOPY  
 HX BREAST BIOPSY Left Surgical Bx - years ago - Benign  US GUIDE ASP BREAST LT CYST W NDL Left   
 years ago  benign Lab Results Component Value Date/Time WBC 7.3 12/11/2017 03:08 PM  
HGB 13.3 12/11/2017 03:08 PM  
HCT 39.4 12/11/2017 03:08 PM  
PLATELET 221 05/97/6511 03:08 PM  
MCV 90 12/11/2017 03:08 PM  
 
Lab Results Component Value Date/Time Cholesterol, total 128 04/11/2018 01:33 PM  
HDL Cholesterol 42 04/11/2018 01:33 PM  
LDL, calculated 52 04/11/2018 01:33 PM  
Triglyceride 172 (H) 04/11/2018 01:33 PM  
CHOL/HDL Ratio 4.3 08/16/2010 01:49 PM  
 
Lab Results Component Value Date/Time GFR est non-AA 82 08/28/2018 09:43 AM  
GFR est AA 94 08/28/2018 09:43 AM  
Creatinine 0.77 08/28/2018 09:43 AM  
BUN 14 08/28/2018 09:43 AM  
Sodium 141 08/28/2018 09:43 AM  
 Potassium 4.5 08/28/2018 09:43 AM  
Chloride 102 08/28/2018 09:43 AM  
CO2 24 08/28/2018 09:43 AM  
  
 
Review of Systems Respiratory: Negative for shortness of breath. Cardiovascular: Negative for chest pain. Physical Exam  
Constitutional: She is oriented to person, place, and time. She appears well-developed and well-nourished. No distress. HENT:  
Head: Normocephalic and atraumatic. Right Ear: External ear normal.  
Left Ear: External ear normal.  
Mouth/Throat: Oropharynx is clear and moist. No oropharyngeal exudate. Eyes: Conjunctivae and EOM are normal. Pupils are equal, round, and reactive to light. Right eye exhibits no discharge. Left eye exhibits no discharge. No scleral icterus. Neck: Normal range of motion. Neck supple. No carotid bruits Cardiovascular: Normal rate, regular rhythm, normal heart sounds and intact distal pulses. Exam reveals no gallop and no friction rub. No murmur heard. Pulmonary/Chest: Effort normal and breath sounds normal. No respiratory distress. She has no wheezes. She has no rales. She exhibits no tenderness. Abdominal: Soft. She exhibits no distension and no mass. There is no tenderness. There is no rebound and no guarding. Musculoskeletal: Normal range of motion. She exhibits edema (Trace BLE). She exhibits no tenderness or deformity. Lymphadenopathy:  
  She has no cervical adenopathy. Neurological: She is alert and oriented to person, place, and time. Coordination normal.  
Skin: Skin is warm and dry. No rash noted. She is not diaphoretic. No erythema. No pallor. Psychiatric: She has a normal mood and affect. Her behavior is normal.  
 
 
ASSESSMENT and PLAN 
  ICD-10-CM ICD-9-CM 1. Physical exam 
 
Discussed diet and w/l, she is doing [employer], wt is improving, flu shot today, shingrix ordered, mammo and DEXA completed, pelvic due, eye exam is due, COLO UTD, discussed all of the above with her, labs reviewed Z00.00 V70.9 2. Essential hypertension Controlled on lisinopril HCTZ and metoprolol twice per day, she will stop cutting her metoprolol in half I10 401.9 MICROALBUMIN, UR, RAND W/ MICROALB/CREAT RATIO  
   METABOLIC PANEL, COMPREHENSIVE  
   HEMOGLOBIN A1C WITH EAG  
   CBC W/O DIFF 3. Severe obesity (BMI 35.0-39. 9) with comorbidity ([address] See above, improving with [employer] 
 E66.01 278.01 MICROALBUMIN, UR, RAND W/ MICROALB/CREAT RATIO  
   METABOLIC PANEL, COMPREHENSIVE  
   HEMOGLOBIN A1C WITH EAG  
   CBC W/O DIFF 4. Hypercholesterolemia Controlled on pravachol 
 E78.00 272.0 MICROALBUMIN, UR, RAND W/ MICROALB/CREAT RATIO  
   METABOLIC PANEL, COMPREHENSIVE  
   HEMOGLOBIN A1C WITH EAG  
   CBC W/O DIFF 5. Reactive depression Pt was able to wean herself off zoloft, overall doing well though she notes her  may want her to go back on it, she will let me know if she wants to resume the medication F32.9 300.4 MICROALBUMIN, UR, RAND W/ MICROALB/CREAT RATIO  
   METABOLIC PANEL, COMPREHENSIVE  
   HEMOGLOBIN A1C WITH EAG  
   CBC W/O DIFF 6. Diabetes mellitus without complication ([facility] Controlled on metformin BID 
 E11.9 250.00 MICROALBUMIN, UR, RAND W/ MICROALB/CREAT RATIO  
   METABOLIC PANEL, COMPREHENSIVE  
   HEMOGLOBIN A1C WITH EAG  
   CBC W/O DIFF Scribed by Marko Finn of [facility], as dictated by Dr. Renee Gillespie. Current diagnosis and concerns discussed with pt at length. Pt understands risks and benefits or current treatment plan and medications, and accepts the treatment and medication with any possible risks. Pt asks appropriate questions, which were answered. Pt was instructed to call with any concerns or problems. This note will not be viewable in [address]

## 2018-09-17 RX ORDER — METFORMIN HYDROCHLORIDE 500 MG/1
TABLET, EXTENDED RELEASE ORAL
Qty: 180 TAB | Refills: 0 | Status: SHIPPED | OUTPATIENT
Start: 2018-09-17 | End: 2019-04-27 | Stop reason: SDUPTHER

## 2018-10-15 RX ORDER — METOPROLOL TARTRATE 50 MG/1
TABLET ORAL
Qty: 180 TAB | Refills: 1 | Status: SHIPPED | OUTPATIENT
Start: 2018-10-15 | End: 2019-04-10 | Stop reason: SDUPTHER

## 2018-10-25 RX ORDER — METOPROLOL TARTRATE 50 MG/1
TABLET ORAL
Qty: 60 TAB | Refills: 0 | Status: SHIPPED | OUTPATIENT
Start: 2018-10-25 | End: 2019-03-01

## 2018-11-06 RX ORDER — ESCITALOPRAM OXALATE 5 MG/1
5 TABLET ORAL DAILY
Qty: 30 TAB | Refills: 3 | Status: SHIPPED | OUTPATIENT
Start: 2018-11-06 | End: 2019-03-01

## 2018-11-30 RX ORDER — LISINOPRIL AND HYDROCHLOROTHIAZIDE 10; 12.5 MG/1; MG/1
TABLET ORAL
Qty: 90 TAB | Refills: 0 | Status: SHIPPED | OUTPATIENT
Start: 2018-11-30 | End: 2019-02-26 | Stop reason: SDUPTHER

## 2018-11-30 RX ORDER — PRAVASTATIN SODIUM 20 MG/1
TABLET ORAL
Qty: 90 TAB | Refills: 0 | Status: SHIPPED | OUTPATIENT
Start: 2018-11-30 | End: 2019-02-26 | Stop reason: SDUPTHER

## 2018-12-12 ENCOUNTER — APPOINTMENT (OUTPATIENT)
Dept: INTERNAL MEDICINE CLINIC | Age: 63
End: 2018-12-12

## 2018-12-12 DIAGNOSIS — F32.9 REACTIVE DEPRESSION: ICD-10-CM

## 2018-12-12 DIAGNOSIS — E78.00 HYPERCHOLESTEROLEMIA: ICD-10-CM

## 2018-12-12 DIAGNOSIS — I10 ESSENTIAL HYPERTENSION: ICD-10-CM

## 2018-12-12 DIAGNOSIS — E66.01 SEVERE OBESITY (BMI 35.0-39.9) WITH COMORBIDITY (HCC): ICD-10-CM

## 2018-12-12 DIAGNOSIS — E11.9 DIABETES MELLITUS WITHOUT COMPLICATION (HCC): ICD-10-CM

## 2018-12-13 LAB
ALBUMIN SERPL-MCNC: 4.4 G/DL (ref 3.6–4.8)
ALBUMIN/GLOB SERPL: 1.6 {RATIO} (ref 1.2–2.2)
ALP SERPL-CCNC: 70 IU/L (ref 39–117)
ALT SERPL-CCNC: 26 IU/L (ref 0–32)
AST SERPL-CCNC: 21 IU/L (ref 0–40)
BILIRUB SERPL-MCNC: 0.4 MG/DL (ref 0–1.2)
BUN SERPL-MCNC: 15 MG/DL (ref 8–27)
BUN/CREAT SERPL: 20 (ref 12–28)
CALCIUM SERPL-MCNC: 9.9 MG/DL (ref 8.7–10.3)
CHLORIDE SERPL-SCNC: 101 MMOL/L (ref 96–106)
CO2 SERPL-SCNC: 25 MMOL/L (ref 20–29)
CREAT SERPL-MCNC: 0.75 MG/DL (ref 0.57–1)
ERYTHROCYTE [DISTWIDTH] IN BLOOD BY AUTOMATED COUNT: 13.1 % (ref 12.3–15.4)
EST. AVERAGE GLUCOSE BLD GHB EST-MCNC: 128 MG/DL
GLOBULIN SER CALC-MCNC: 2.8 G/DL (ref 1.5–4.5)
GLUCOSE SERPL-MCNC: 123 MG/DL (ref 65–99)
HBA1C MFR BLD: 6.1 % (ref 4.8–5.6)
HCT VFR BLD AUTO: 41.8 % (ref 34–46.6)
HGB BLD-MCNC: 14.1 G/DL (ref 11.1–15.9)
MCH RBC QN AUTO: 30.4 PG (ref 26.6–33)
MCHC RBC AUTO-ENTMCNC: 33.7 G/DL (ref 31.5–35.7)
MCV RBC AUTO: 90 FL (ref 79–97)
PLATELET # BLD AUTO: 245 X10E3/UL (ref 150–379)
POTASSIUM SERPL-SCNC: 4.4 MMOL/L (ref 3.5–5.2)
PROT SERPL-MCNC: 7.2 G/DL (ref 6–8.5)
RBC # BLD AUTO: 4.64 X10E6/UL (ref 3.77–5.28)
SODIUM SERPL-SCNC: 140 MMOL/L (ref 134–144)
WBC # BLD AUTO: 7.1 X10E3/UL (ref 3.4–10.8)

## 2018-12-17 ENCOUNTER — OFFICE VISIT (OUTPATIENT)
Dept: INTERNAL MEDICINE CLINIC | Age: 63
End: 2018-12-17

## 2018-12-17 VITALS
WEIGHT: 266 LBS | SYSTOLIC BLOOD PRESSURE: 127 MMHG | TEMPERATURE: 98 F | DIASTOLIC BLOOD PRESSURE: 73 MMHG | HEIGHT: 69 IN | RESPIRATION RATE: 19 BRPM | BODY MASS INDEX: 39.4 KG/M2 | HEART RATE: 74 BPM | OXYGEN SATURATION: 95 %

## 2018-12-17 DIAGNOSIS — E78.00 HYPERCHOLESTEROLEMIA: Primary | ICD-10-CM

## 2018-12-17 DIAGNOSIS — I10 ESSENTIAL HYPERTENSION: ICD-10-CM

## 2018-12-17 DIAGNOSIS — N64.4 BREAST PAIN, LEFT: ICD-10-CM

## 2018-12-17 DIAGNOSIS — F32.9 REACTIVE DEPRESSION: ICD-10-CM

## 2018-12-17 DIAGNOSIS — R07.89 CHEST PAIN, ATYPICAL: ICD-10-CM

## 2018-12-17 DIAGNOSIS — R73.03 PREDIABETES: ICD-10-CM

## 2018-12-17 DIAGNOSIS — E66.9 OBESITY (BMI 35.0-39.9 WITHOUT COMORBIDITY): ICD-10-CM

## 2018-12-17 LAB
ALBUMIN UR QL STRIP: 10 MG/L
CREATININE, URINE POC: 300 MG/DL
MICROALBUMIN/CREAT RATIO POC: <30 MG/G

## 2018-12-17 NOTE — PROGRESS NOTES
HISTORY OF PRESENT ILLNESS  Alexandrea Morris is a 58 y.o. female. HPI  Last here 8/30/18. Pt is here to f/u on chronic conditions.      BP is 127/73  Continues on lisinopril-HCTZ 10-12.5mg daily and metoprolol 50mg BID  Pt has been taking metoprolol 25mg BID through a misunderstanding  Will have her take the correct dose of metoprolol 50mg BID      BS at home running around 123, 130 in the AM fasting   BS at home running around 180-190 before bed  Continues on metformin 500mg once daily  Recall increased metformin caused diarrhea      Wt today is 266 lbs --up 5 lbs x lov   Pt was briefly doing [employer] but she stopped going  Advised her to go back  Pt states she has been eating \"horribly\" in the last couple of months    Reviewed labs 12/18      Continues on pravachol 20mg daily for cholesterol       No longer on zoloft 50mg after a rash  This was replaced with lexapro 5mg - pt has this at home but she has not used it yet  Pt states that she is doing well without medication  Discussed that she can hold off lexapro for now, and contact clinic if she decides to start it in the future d/t feeling more down     Pt c/o a pulling feeling in her chest every now and then  x 11/18  She feels this every day, but only a couple times a day, lasting for a second  When she feels this, she is usually moving around and doing something, doesn't happen she is sitting down  Only with exertion, resolves with rest  States that this does not feel painful, just an annoying pulling/yanking sensation  Will get EKG     Pt states that she broke out in an itchy rash for 1 month  She had sx across her stomach and the back of her labs  This sx resolved - possibly from zoloft, which she stopped    ACP not on file. SDMs are her  (Fernando Evangelista) and son Gracie Melendez).   Provided information in the past.       PREVENTIVE:    Colonoscopy: 8/11/16, Dr. Val Casey, repeat 10 years  Pap: Dr. Viola Perdue, 10/9/18  Mammogram: 7/25/18, negative  DEXA: 7/25/18, nl  Tdap: 3/31/2014    Pneumovax: 5/17/2017  [id number]: not yet needed  Zostavax: 4/05/2015  Shingrix: ordered 08/30/18   Flu shot: 08/30/18   Foot exam: 04/17/18   Microalbumin: 12/17, minimal   A1c:1/15 6.1, 6/16 6.4, 10/16 6.0, 2/17 6.5, 5/17 6.2, 8/17 5.6, 12/17 5.9, 4/18 6.0, 8/18 5.9, 12/18 6.1  Eye exam: Dr. Rima Martinez, 6/08/17, due, scheduled for 2/4/19  Hep C screen: 5/17, negative  Lipids: 4/18 LDL 52    Patient Active Problem List    Diagnosis Date Noted    Severe obesity (BMI 35.0-39. 9) with comorbidity ([facility] [address] 04/17/2018    Obesity (BMI 35.0-39.9 without comorbidity) 12/13/2017    Prediabetes 01/06/2016    Hypercholesterolemia     Hypertension     Glucose intolerance (impaired glucose tolerance)     Depression      Current Outpatient Medications   Medication Sig Dispense Refill    pravastatin (PRAVACHOL) 20 mg tablet TAKE 1 TAB BY MOUTH DAILY. 90 Tab 0    lisinopril-hydroCHLOROthiazide (PRINZIDE, ZESTORETIC) 10-12.5 mg per tablet TAKE 1 TAB BY MOUTH DAILY. 90 Tab 0    metoprolol tartrate (LOPRESSOR) 50 mg tablet TAKE 1 TABLET BY MOUTH TWICE A DAY 60 Tab 0    metoprolol tartrate (LOPRESSOR) 50 mg tablet TAKE 1 TAB BY MOUTH TWO (2) TIMES A DAY. 180 Tab 1    metFORMIN ER (GLUCOPHAGE XR) 500 mg tablet TAKE 2 TABS BY MOUTH DAILY (WITH DINNER). 180 Tab 0    ONETOUCH ULTRA TEST strip TEST BLOOD SUGAR TWICE WEEKLY 50 Strip 0    aspirin delayed-release (ASPIR-81) 81 mg tablet Take 1 Tab by mouth daily. 90 Tab 3    escitalopram oxalate (LEXAPRO) 5 mg tablet Take 1 Tab by mouth daily. 30 Tab 3    sertraline (ZOLOFT) 50 mg tablet Take 1 Tab by mouth daily.  90 Tab 3    Blood-Glucose Meter (FREESTYLE LITE METER) monitoring kit Daily 1 Kit 0     Past Surgical History:   Procedure Laterality Date    COLONOSCOPY N/A 8/11/2016    COLONOSCOPY performed by Danilo Ann MD at [address] [place] [address] [place] ENDOSCOPY    HX BREAST BIOPSY Left     Surgical Bx - years ago - Benign    US GUIDE ASP BREAST LT CYST W NDL Left years ago  benign      Lab Results   Component Value Date/Time    WBC 7.1 12/12/2018 01:33 PM    HGB 14.1 12/12/2018 01:33 PM    HCT 41.8 12/12/2018 01:33 PM    PLATELET 213 27/43/6357 01:33 PM    MCV 90 12/12/2018 01:33 PM     Lab Results   Component Value Date/Time    Cholesterol, total 128 04/11/2018 01:33 PM    HDL Cholesterol 42 04/11/2018 01:33 PM    LDL, calculated 52 04/11/2018 01:33 PM    Triglyceride 172 (H) 04/11/2018 01:33 PM    CHOL/HDL Ratio 4.3 08/16/2010 01:49 PM     Lab Results   Component Value Date/Time    GFR est non-AA 85 12/12/2018 01:33 PM    GFR est AA 97 12/12/2018 01:33 PM    Creatinine 0.75 12/12/2018 01:33 PM    BUN 15 12/12/2018 01:33 PM    Sodium 140 12/12/2018 01:33 PM    Potassium 4.4 12/12/2018 01:33 PM    Chloride 101 12/12/2018 01:33 PM    CO2 25 12/12/2018 01:33 PM        Review of Systems   Respiratory: Negative for shortness of breath. Cardiovascular: Negative for chest pain. Physical Exam   Constitutional: She is oriented to person, place, and time. She appears well-developed and well-nourished. No distress. HENT:   Head: Normocephalic and atraumatic. Mouth/Throat: Oropharynx is clear and moist. No oropharyngeal exudate. Eyes: Conjunctivae and EOM are normal. Right eye exhibits no discharge. Left eye exhibits no discharge. Neck: Normal range of motion. Neck supple. Cardiovascular: Normal rate, regular rhythm and normal heart sounds. Exam reveals no gallop and no friction rub. No murmur heard. Pulmonary/Chest: Effort normal and breath sounds normal. No respiratory distress. She has no wheezes. She has no rales. She exhibits no tenderness. Musculoskeletal: Normal range of motion. She exhibits no edema, tenderness or deformity. Lymphadenopathy:     She has no cervical adenopathy. Neurological: She is alert and oriented to person, place, and time. Coordination normal.   Skin: Skin is warm and dry. No rash noted. She is not diaphoretic. No erythema.  No pallor. Psychiatric: She has a normal mood and affect. Her behavior is normal.       ASSESSMENT and PLAN    ICD-10-CM ICD-9-CM    1. Hypercholesterolemia    Controlled on pravachol in April, will repeat lipids at f/u   E78.00 272.0    2. Prediabetes    Continues on metformin, pt is between prediabetes and actual diabetes, home readings more in diabetic range, continue metformin, a1c at goal   R73.03 790.29    3. Essential hypertension    BP controlled on lisinopril-HCTZ and metoprolol BID, continue no change to dose   I10 401.9    4. Obesity (BMI 35.0-39.9 without comorbidity)    Needs to work more aggressively on w/l, wt up from lov, she stopped doing [employer], advised her to resume [employer]   E66.9 278.00    5. Breast pain, left    Pt describes a L sided pain, really is more in chest wall, not reproducible and only exertional, given her risk factors need to rule out cardiac cause, EKG was nl, will get stress ECHO for further eval   N64.4 611.71    6. Reactive depression    Stable, she has tapered off of zoloft which may have caused a rash, doing well without meds, she has rx for lexapro at home, if sx progress she will start it, for now will remain off medication   F32.9 300.4    7. Chest pain, atypical    See above, pain appears to be more in chest wall and not in breast area   R07.89 786.59         Scribed by [place] of [address] [place] [address] [address], as dictated by Dr. Antonia Blackman. Current diagnosis and concerns discussed with pt at length. Pt understands risks and benefits or current treatment plan and medications, and accepts the treatment and medication with any possible risks. Pt asks appropriate questions, which were answered. Pt was instructed to call with any concerns or problems. I have reviewed the note documented by the scribe. The services provided are my own.   The documentation is accurate

## 2019-01-08 ENCOUNTER — HOSPITAL ENCOUNTER (OUTPATIENT)
Dept: NON INVASIVE DIAGNOSTICS | Age: 64
Discharge: HOME | End: 2019-01-08
Attending: INTERNAL MEDICINE
Payer: COMMERCIAL

## 2019-01-08 DIAGNOSIS — R07.9 CHEST PAIN: ICD-10-CM

## 2019-01-08 LAB
ATTENDING PHYSICIAN, CST07: NORMAL
DIAGNOSIS, 93000: NORMAL
DUKE TM SCORE RESULT, CST14: NORMAL
DUKE TREADMILL SCORE, CST13: 5456
ECG INTERP BEFORE EX, CST11: NORMAL
ECG INTERP DURING EX, CST12: NORMAL
FUNCTIONAL CAPACITY, CST17: NORMAL
KNOWN CARDIAC CONDITION, CST08: NORMAL
MAX. DIASTOLIC BP, CST04: 80 MMHG
MAX. HEART RATE, CST05: 153 BPM
MAX. SYSTOLIC BP, CST03: 184 MMHG
OVERALL BP RESPONSE TO EXERCISE, CST16: NORMAL
OVERALL HR RESPONSE TO EXERCISE, CST15: NORMAL
PEAK EX METS, CST10: 7 METS
PROTOCOL NAME, CST01: NORMAL
REASON FOR TERMINATION: 105 BPM
TEST INDICATION, CST09: NORMAL
TIME IN EXERCISE PHASE, CST02: NORMAL

## 2019-01-08 PROCEDURE — 93351 STRESS TTE COMPLETE: CPT

## 2019-01-09 NOTE — PROGRESS NOTES
Called, spoke to pt. Two pt identifiers confirmed. Pt informed per Dr. Nilesh Chou stress echo is normal.  Pt verbalized understanding of information discussed w/ no further questions at this time.

## 2019-02-18 RX ORDER — LANCETS
EACH MISCELLANEOUS
Qty: 100 EACH | Refills: 1 | Status: SHIPPED | OUTPATIENT
Start: 2019-02-18 | End: 2019-03-01

## 2019-02-18 NOTE — TELEPHONE ENCOUNTER
PCP: Keagan Goodson MD    Last appt: 12/17/2018  Future Appointments   Date Time Provider Brandon Nowak   4/22/2019  3:00 PM Keagan Goodson MD [facility] 87       Requested Prescriptions     Pending Prescriptions Disp Refills    glucose blood VI test strips (ONETOUCH ULTRA BLUE TEST STRIP) strip 100 Strip 1     Sig: Check blood sugar twice weekly.  lancets (ONETOUCH ULTRASOFT LANCETS) misc 100 Each 1     Sig: Check blood sugar twice weekly.

## 2019-02-26 RX ORDER — LISINOPRIL AND HYDROCHLOROTHIAZIDE 10; 12.5 MG/1; MG/1
1 TABLET ORAL DAILY
Qty: 90 TAB | Refills: 0 | Status: SHIPPED | OUTPATIENT
Start: 2019-02-26 | End: 2019-05-22 | Stop reason: SDUPTHER

## 2019-02-26 RX ORDER — PRAVASTATIN SODIUM 20 MG/1
20 TABLET ORAL DAILY
Qty: 90 TAB | Refills: 1 | Status: SHIPPED | OUTPATIENT
Start: 2019-02-26 | End: 2019-08-25 | Stop reason: SDUPTHER

## 2019-03-01 ENCOUNTER — OFFICE VISIT (OUTPATIENT)
Dept: INTERNAL MEDICINE CLINIC | Age: 64
End: 2019-03-01

## 2019-03-01 VITALS
OXYGEN SATURATION: 98 % | WEIGHT: 263 LBS | BODY MASS INDEX: 38.95 KG/M2 | HEART RATE: 66 BPM | HEIGHT: 69 IN | DIASTOLIC BLOOD PRESSURE: 78 MMHG | RESPIRATION RATE: 16 BRPM | TEMPERATURE: 97.9 F | SYSTOLIC BLOOD PRESSURE: 132 MMHG

## 2019-03-01 DIAGNOSIS — R73.02 GLUCOSE INTOLERANCE (IMPAIRED GLUCOSE TOLERANCE): Primary | ICD-10-CM

## 2019-03-01 DIAGNOSIS — E11.9 CONTROLLED TYPE 2 DIABETES MELLITUS WITHOUT COMPLICATION, WITHOUT LONG-TERM CURRENT USE OF INSULIN (HCC): ICD-10-CM

## 2019-03-01 DIAGNOSIS — I10 ESSENTIAL HYPERTENSION: ICD-10-CM

## 2019-03-01 DIAGNOSIS — J06.9 URI, ACUTE: Primary | ICD-10-CM

## 2019-03-01 LAB
FLUAV+FLUBV AG NOSE QL IA.RAPID: NEGATIVE POS/NEG
FLUAV+FLUBV AG NOSE QL IA.RAPID: POSITIVE POS/NEG
VALID INTERNAL CONTROL?: YES

## 2019-03-01 RX ORDER — LANCETS
EACH MISCELLANEOUS
Qty: 1 PACKAGE | Refills: 11 | Status: SHIPPED | OUTPATIENT
Start: 2019-03-01 | End: 2019-04-27 | Stop reason: SDUPTHER

## 2019-03-01 RX ORDER — LANCETS
EACH MISCELLANEOUS
Qty: 1 PACKAGE | Refills: 11 | Status: SHIPPED | OUTPATIENT
Start: 2019-03-01 | End: 2019-03-01 | Stop reason: SDUPTHER

## 2019-03-01 RX ORDER — OSELTAMIVIR PHOSPHATE 75 MG/1
75 CAPSULE ORAL 2 TIMES DAILY
Qty: 10 CAP | Refills: 0 | Status: SHIPPED | OUTPATIENT
Start: 2019-03-01 | End: 2019-03-06

## 2019-03-01 NOTE — LETTER
NOTIFICATION RETURN TO WORK / SCHOOL 
 
3/1/2019 8:04 AM 
 
Ms. Alexandrea Jose [address] [facility] [address] [id number] To Whom It May Concern: 
 
Alexandrea Vides is currently under the care of [facility]. She will return to work/school on: 3/4/19 If there are questions or concerns please have the patient contact our office.  
 
 
 
Sincerely, 
 
 
Julius Layton MD

## 2019-03-01 NOTE — PROGRESS NOTES
HISTORY OF PRESENT ILLNESS  Alexandrea Elam is a 58 y.o. female. HPI  Last here 12/17/18. Pt is here for acute care. Pt has been feeling ill x Monday  Pt has felt very hot but did not check her temperature  She has had a cough, congestion, aches, some wheezing, pain around L ear area  Denies sore throat  Pt has taken tylenol for her aches  Some people have been ill at work  Pt has been staying home from work since Monday  Checked rapid flu test - positive  Will give tamilfu - discussed that this may not be as effective at this point  Discussed transmission precautions    Of note, pt is new to Medicare and will need wellness visit at f/u     Patient Active Problem List    Diagnosis Date Noted    Severe obesity (BMI 35.0-39. 9) with comorbidity ([address] 04/17/2018    Obesity (BMI 35.0-39.9 without comorbidity) 12/13/2017    Prediabetes 01/06/2016    Hypercholesterolemia     Hypertension     Glucose intolerance (impaired glucose tolerance)     Depression      Current Outpatient Medications   Medication Sig Dispense Refill    pravastatin (PRAVACHOL) 20 mg tablet Take 1 Tab by mouth daily. 90 Tab 1    lisinopril-hydroCHLOROthiazide (PRINZIDE, ZESTORETIC) 10-12.5 mg per tablet Take 1 Tab by mouth daily. 90 Tab 0    glucose blood VI test strips (ONETOUCH ULTRA BLUE TEST STRIP) strip Check blood sugar twice weekly. 100 Strip 1    lancets (ONETOUCH ULTRASOFT LANCETS) misc Check blood sugar twice weekly. 100 Each 1    escitalopram oxalate (LEXAPRO) 5 mg tablet Take 1 Tab by mouth daily. 30 Tab 3    metoprolol tartrate (LOPRESSOR) 50 mg tablet TAKE 1 TABLET BY MOUTH TWICE A DAY 60 Tab 0    metoprolol tartrate (LOPRESSOR) 50 mg tablet TAKE 1 TAB BY MOUTH TWO (2) TIMES A DAY. 180 Tab 1    metFORMIN ER (GLUCOPHAGE XR) 500 mg tablet TAKE 2 TABS BY MOUTH DAILY (WITH DINNER).  180 Tab 0    ONETOUCH ULTRA TEST strip TEST BLOOD SUGAR TWICE WEEKLY 50 Strip 0    aspirin delayed-release (ASPIR-81) 81 mg tablet Take 1 Tab by mouth daily. 90 Tab 3     Past Surgical History:   Procedure Laterality Date    COLONOSCOPY N/A 8/11/2016    COL[facility]io [facility]in [facility] at [facility] ENDOSCOPY    HX BREAST BIOPSY Left     Surgical Bx - years ago - Benign    US GUIDE ASP BREAST LT CYST W NDL Left     years ago  benign      Lab Results   Component Value Date/Time    WBC 7.1 12/12/2018 01:33 PM    HGB 14.1 12/12/2018 01:33 PM    HCT 41.8 12/12/2018 01:33 PM    PLATELET 521 03/05/1014 01:33 PM    MCV 90 12/12/2018 01:33 PM     Lab Results   Component Value Date/Time    Cholesterol, total 128 04/11/2018 01:33 PM    HDL Cholesterol 42 04/11/2018 01:33 PM    LDL, calculated 52 04/11/2018 01:33 PM    Triglyceride 172 (H) 04/11/2018 01:33 PM    CHOL/HDL Ratio 4.3 08/16/2010 01:49 PM     Lab Results   Component Value Date/Time    GFR est non-AA 85 12/12/2018 01:33 PM    GFR est AA 97 12/12/2018 01:33 PM    Creatinine 0.75 12/12/2018 01:33 PM    BUN 15 12/12/2018 01:33 PM    Sodium 140 12/12/2018 01:33 PM    Potassium 4.4 12/12/2018 01:33 PM    Chloride 101 12/12/2018 01:33 PM    CO2 25 12/12/2018 01:33 PM        Review of Systems   HENT: Positive for congestion. Negative for sore throat. Respiratory: Positive for cough and wheezing. Negative for shortness of breath. Cardiovascular: Negative for chest pain. Musculoskeletal: Positive for myalgias. Physical Exam   Constitutional: She is oriented to person, place, and time. She appears well-developed and well-nourished. No distress. HENT:   Head: Normocephalic and atraumatic. Right Ear: External ear normal.   Left Ear: External ear normal.   Mouth/Throat: Oropharynx is clear and moist. No oropharyngeal exudate. Erythema to BL ears, more on the L   Eyes: Conjunctivae and EOM are normal. Right eye exhibits no discharge. Left eye exhibits no discharge. Neck: Normal range of motion. Neck supple. Cardiovascular: Normal rate, regular rhythm and normal heart sounds.  Exam reveals no gallop and no friction rub. No murmur heard. Pulmonary/Chest: Effort normal and breath sounds normal. No respiratory distress. She has no wheezes. She has no rales. She exhibits no tenderness. Musculoskeletal: Normal range of motion. She exhibits no edema, tenderness or deformity. Lymphadenopathy:     She has no cervical adenopathy. Neurological: She is alert and oriented to person, place, and time. Coordination normal.   Skin: Skin is warm and dry. No rash noted. She is not diaphoretic. No erythema. No pallor. Psychiatric: She has a normal mood and affect. Her behavior is normal.       ASSESSMENT and PLAN    ICD-10-CM ICD-9-CM    1. URI, acute    Positive for flu, provided tamiflu, she is 5 days out form onset of sx, so she will decide if she wants to take it or not, stay out of work until Monday, supportive care with tylenol or motrin   J06.9 465.9 AMB POC WALI INFLUENZA A/B TEST   2. Essential hypertension    controlled   I10 401.9         Scribed by Marilin Marquez of [address] [place] [address] [address], as dictated by Dr. Krysten Morgan. Current diagnosis and concerns discussed with pt at length. Pt understands risks and benefits or current treatment plan and medications, and accepts the treatment and medication with any possible risks. Pt asks appropriate questions, which were answered. Pt was instructed to call with any concerns or problems. I have reviewed the note documented by the scribe. The services provided are my own.   The documentation is accurate

## 2019-03-01 NOTE — TELEPHONE ENCOUNTER
PCP: Raúl Degroot MD    Last appt: 3/1/2019  Future Appointments   Date Time Provider Brandon Nowak   4/22/2019  3:00 PM Raúl Degroot MD [facility] 87       Requested Prescriptions     Pending Prescriptions Disp Refills    glucose blood VI test strips (ONETOUCH ULTRA BLUE TEST STRIP) strip 100 Strip 11     Sig: Use to check blood sugar once daily

## 2019-04-10 RX ORDER — METOPROLOL TARTRATE 50 MG/1
TABLET ORAL
Qty: 180 TAB | Refills: 1 | Status: SHIPPED | OUTPATIENT
Start: 2019-04-10 | End: 2019-10-10 | Stop reason: SDUPTHER

## 2019-04-18 ENCOUNTER — HOSPITAL ENCOUNTER (OUTPATIENT)
Dept: LAB | Age: 64
Discharge: HOME | End: 2019-04-18
Payer: MEDICARE

## 2019-04-18 DIAGNOSIS — F32.9 REACTIVE DEPRESSION: ICD-10-CM

## 2019-04-18 DIAGNOSIS — R73.03 PREDIABETES: ICD-10-CM

## 2019-04-18 DIAGNOSIS — I10 ESSENTIAL HYPERTENSION: ICD-10-CM

## 2019-04-18 DIAGNOSIS — N64.4 BREAST PAIN, LEFT: ICD-10-CM

## 2019-04-18 DIAGNOSIS — E78.00 HYPERCHOLESTEROLEMIA: ICD-10-CM

## 2019-04-18 DIAGNOSIS — E66.9 OBESITY (BMI 35.0-39.9 WITHOUT COMORBIDITY): ICD-10-CM

## 2019-04-18 DIAGNOSIS — R07.89 CHEST PAIN, ATYPICAL: ICD-10-CM

## 2019-04-18 PROCEDURE — 80061 LIPID PANEL: CPT

## 2019-04-18 PROCEDURE — 80053 COMPREHEN METABOLIC PANEL: CPT

## 2019-04-18 PROCEDURE — 85027 COMPLETE CBC AUTOMATED: CPT

## 2019-04-18 PROCEDURE — 36415 COLL VENOUS BLD VENIPUNCTURE: CPT

## 2019-04-18 PROCEDURE — 83036 HEMOGLOBIN GLYCOSYLATED A1C: CPT

## 2019-04-19 ENCOUNTER — DOCUMENTATION ONLY (OUTPATIENT)
Dept: INTERNAL MEDICINE CLINIC | Age: 64
End: 2019-04-19

## 2019-04-19 LAB
ALBUMIN SERPL-MCNC: 4.2 G/DL (ref 3.6–4.8)
ALBUMIN/GLOB SERPL: 1.6 {RATIO} (ref 1.2–2.2)
ALP SERPL-CCNC: 72 IU/L (ref 39–117)
ALT SERPL-CCNC: 22 IU/L (ref 0–32)
AST SERPL-CCNC: 18 IU/L (ref 0–40)
BILIRUB SERPL-MCNC: 0.4 MG/DL (ref 0–1.2)
BUN SERPL-MCNC: 12 MG/DL (ref 8–27)
BUN/CREAT SERPL: 17 (ref 12–28)
CALCIUM SERPL-MCNC: 10 MG/DL (ref 8.7–10.3)
CHLORIDE SERPL-SCNC: 102 MMOL/L (ref 96–106)
CHOLEST SERPL-MCNC: 128 MG/DL (ref 100–199)
CO2 SERPL-SCNC: 24 MMOL/L (ref 20–29)
CREAT SERPL-MCNC: 0.71 MG/DL (ref 0.57–1)
ERYTHROCYTE [DISTWIDTH] IN BLOOD BY AUTOMATED COUNT: 13.4 % (ref 12.3–15.4)
EST. AVERAGE GLUCOSE BLD GHB EST-MCNC: 137 MG/DL
GLOBULIN SER CALC-MCNC: 2.7 G/DL (ref 1.5–4.5)
GLUCOSE SERPL-MCNC: 174 MG/DL (ref 65–99)
HBA1C MFR BLD: 6.4 % (ref 4.8–5.6)
HCT VFR BLD AUTO: 40.8 % (ref 34–46.6)
HDLC SERPL-MCNC: 38 MG/DL
HGB BLD-MCNC: 13.7 G/DL (ref 11.1–15.9)
LDLC SERPL CALC-MCNC: 48 MG/DL (ref 0–99)
MCH RBC QN AUTO: 30 PG (ref 26.6–33)
MCHC RBC AUTO-ENTMCNC: 33.6 G/DL (ref 31.5–35.7)
MCV RBC AUTO: 89 FL (ref 79–97)
PLATELET # BLD AUTO: 234 X10E3/UL (ref 150–379)
POTASSIUM SERPL-SCNC: 4.5 MMOL/L (ref 3.5–5.2)
PROT SERPL-MCNC: 6.9 G/DL (ref 6–8.5)
RBC # BLD AUTO: 4.57 X10E6/UL (ref 3.77–5.28)
SODIUM SERPL-SCNC: 140 MMOL/L (ref 134–144)
TRIGL SERPL-MCNC: 208 MG/DL (ref 0–149)
VLDLC SERPL CALC-MCNC: 42 MG/DL (ref 5–40)
WBC # BLD AUTO: 6.9 X10E3/UL (ref 3.4–10.8)

## 2019-04-19 NOTE — PROGRESS NOTES
Medicare Part B Preventive Services Guidelines/Limitations Date last completed and Frequency Due Date   Bone Mass Measurement  (age 72 & older, biennial) Requires diagnosis related to osteoporosis or estrogen deficiency. Biennial benefit unless patient has history of long-term glucocorticoid tx or baseline is needed because initial test was by other method Completed 7/2018      Recommended every 2 years Due 7/2020   Cardiovascular Screening Blood Tests (every 5 years)  Total cholesterol, HDL, Triglycerides Order as a panel if possible Completed 4/2019    Recommended annually Due 4/2020   Colorectal Cancer Screening  -Fecal occult blood test (annual)  -Flexible sigmoidoscopy (5y)  -Screening colonoscopy (10y)  -Barium Enema  Completed 8/11/16 with Dr Promise Castillo    Recommended repeat in 10 years  Due 8/2026   Counseling to Prevent Tobacco Use (up to 8 sessions per year)  - Counseling greater than 3 and up to 10 minutes  - Counseling greater than 10 minutes Patients must be asymptomatic of tobacco-related conditions to receive as preventive service N/A N/A   Diabetes Screening Tests (at least every 3 years, Medicare covers annually or at 6-month intervals for prediabetic patients)    Fasting blood sugar (FBS) or glucose tolerance test (GTT) Patient must be diagnosed with one of the following:  -Hypertension, Dyslipidemia, obesity, previous impaired FBS or GTT  Or any two of the following: overweight, FH of diabetes, age ? 72, history of gestational diabetes, birth of baby weighing more than 9 pounds Completed 4/2019 with 6.4      Recommended every 3-6 months for Pre-Diabetics and Diabetics Due 7/2019 - 10/2019   Diabetes Self-Management Training (DSMT) (no USPSTF recommendation) Requires referral by treating physician for patient with diabetes or renal disease. 10 hours of initial DSMT session of no less than 30 minutes each in a continuous 12-month period. 2 hours of follow-up DSMT in subsequent years.  N/A N/A Glaucoma Screening (no USPSTF recommendation) Diabetes mellitus, family history, , age 48 or over,  American, age 72 or over Completed 2/2019    Recommended annually Due 2/2020   Human Immunodeficiency Virus (HIV) Screening (annually for increased risk patients)  HIV-1 and HIV-2 by EIA, NILE, rapid antibody test, or oral mucosa transudate Patient must be at increased risk for HIV infection per USPSTF guidelines or pregnant. Tests covered annually for patients at increased risk. Pregnant patients may receive up to 3 test during pregnancy. N/A N/A   Medical Nutrition Therapy (MNT) (for diabetes or renal disease not recommended schedule) Requires referral by treating physician for patient with diabetes or renal disease. Can be provided in same year as diabetes self-management training (DSMT), and CMS recommends medical nutrition therapy take place after DSMT. Up to 3 hours for initial year and 2 hours in subsequent years. N/A N/A         Seasonal Influenza Vaccination (annually)  Completed Fall 2018    Recommended annually Due Fall 2019   Pneumococcal Vaccination (once after 72)  Pneumococcal 23 - 5/17/17  Recommended once over the age of 72    Prevnar 15 - not yet completed   Recommended once over the age of 72 Complete          Due now   Hepatitis B Vaccinations (if medium/high risk) Medium/high risk factors:  End-stage renal disease,  Hemophiliacs who received Factor VIII or IX concentrates, Clients of institutions for the mentally retarded, Persons who live in the same house as a HepB virus carrier, Homosexual men, Illicit injectable drug abusers. N/A N/A   Screening Mammography (biennial age 54-69)?  Annually (age 36 or over) Completed 7/2018    Recommended annually   Due 7/2019   Screening Pap Tests and Pelvic Examination (up to age 79 and after 79 if unknown history or abnormal study last 10 years) Every 24 months except high risk Completed 10/9/18    Recommended every other year Due 10/2020   Ultrasound Screening for Abdominal Aortic Aneurysm (AAA) (once) Patient must be referred through [facility] and not have had a screening for abdominal aortic aneurysm before under Medicare.   Limited to patients who meet one of the following criteria:  - Men who are 73-68 years old and have smoked more than 100 cigarettes in their lifetime.  -Anyone with a FH of AAA  -Anyone recommended for screening by USPSTF N/A N/A

## 2019-04-22 ENCOUNTER — OFFICE VISIT (OUTPATIENT)
Dept: INTERNAL MEDICINE CLINIC | Age: 64
End: 2019-04-22

## 2019-04-22 VITALS
TEMPERATURE: 98 F | RESPIRATION RATE: 16 BRPM | BODY MASS INDEX: 39.69 KG/M2 | WEIGHT: 268 LBS | HEIGHT: 69 IN | HEART RATE: 69 BPM | OXYGEN SATURATION: 97 % | SYSTOLIC BLOOD PRESSURE: 121 MMHG | DIASTOLIC BLOOD PRESSURE: 74 MMHG

## 2019-04-22 DIAGNOSIS — R73.02 GLUCOSE INTOLERANCE (IMPAIRED GLUCOSE TOLERANCE): ICD-10-CM

## 2019-04-22 DIAGNOSIS — R23.8 PAPULE: ICD-10-CM

## 2019-04-22 DIAGNOSIS — E78.00 HYPERCHOLESTEROLEMIA: ICD-10-CM

## 2019-04-22 DIAGNOSIS — I10 ESSENTIAL HYPERTENSION: ICD-10-CM

## 2019-04-22 DIAGNOSIS — Z00.00 WELCOME TO MEDICARE PREVENTIVE VISIT: ICD-10-CM

## 2019-04-22 DIAGNOSIS — E66.01 SEVERE OBESITY (BMI 35.0-39.9) WITH COMORBIDITY (HCC): Primary | ICD-10-CM

## 2019-04-22 DIAGNOSIS — E11.9 TYPE 2 DIABETES MELLITUS WITHOUT COMPLICATION, WITHOUT LONG-TERM CURRENT USE OF INSULIN (HCC): ICD-10-CM

## 2019-04-22 NOTE — PROGRESS NOTES
This is a \"Welcome to [employer]"  Initial Preventive Physical Examination (IPPE) providing Personalized Prevention Plan Services (Performed in the first 12 months of enrollment) I have reviewed the patient's medical history in detail and updated the computerized patient record. History Past Medical History:  
Diagnosis Date  Depression  Glucose intolerance (impaired glucose tolerance)  Hypercholesterolemia  Hypertension Past Surgical History:  
Procedure Laterality Date  COLONOSCOPY N/A 8/11/2016 COLONOSCOPY performed by Kayla Holder MD at [facility] ENDOSCOPY  
 HX BREAST BIOPSY Left Surgical Bx - years ago - Benign  US GUIDE ASP BREAST LT CYST W NDL Left   
 years ago  benign Current Outpatient Medications Medication Sig Dispense Refill  varicella-zoster recombinant, PF, (SHINGRIX, PF,) 50 mcg/0.5 mL susr injection 0.5mL by IntraMUSCular route once now and then repeat in 2-6 months 0.5 mL 1  
 metoprolol tartrate (LOPRESSOR) 50 mg tablet TAKE 1 TAB BY MOUTH TWO (2) TIMES A DAY. 180 Tab 1  
 glucose blood VI test strips (ONETOUCH ULTRA BLUE TEST STRIP) strip Use to check blood sugar once daily 100 Strip 11  
 lancets (ONETOUCH ULTRASOFT LANCETS) misc Use to check blood sugar once daily 1 Package 11  
 pravastatin (PRAVACHOL) 20 mg tablet Take 1 Tab by mouth daily. 90 Tab 1  
 lisinopril-hydroCHLOROthiazide (PRINZIDE, ZESTORETIC) 10-12.5 mg per tablet Take 1 Tab by mouth daily. 90 Tab 0  
 metFORMIN ER (GLUCOPHAGE XR) 500 mg tablet TAKE 2 TABS BY MOUTH DAILY (WITH DINNER). 180 Tab 0  
 aspirin delayed-release (ASPIR-81) 81 mg tablet Take 1 Tab by mouth daily. 90 Tab 3 No Known Allergies Family History Problem Relation Age of Onset  Heart Disease Mother  Hypertension Mother  Heart Disease Father  Cancer Father   
     skin  Hypertension Father  Diabetes Brother  Stroke Maternal Grandmother  Breast Cancer Maternal Aunt 50's Social History Tobacco Use  Smoking status: Never Smoker  Smokeless tobacco: Never Used Substance Use Topics  Alcohol use: Yes Comment: social  
 
Diet, Lifestyle: The patient is prescribed and follows a special diet. --tries to follow DM diet, will see nutrition Exercise level: sedentary Walks a little Depression Risk Screen 3 most recent PHQ Screens 4/22/2019 Little interest or pleasure in doing things Not at all Feeling down, depressed, irritable, or hopeless Not at all Total Score PHQ 2 0 Alcohol Risk Screen You do not drink alcohol or very rarely. Wine occasionally , 1 on weekend Functional Ability and Level of Safety Hearing Loss Hearing is good. Vision Screening Vision is good. fair. Visual Acuity Screening Right eye Left eye Both eyes Without correction: 20/60 20/200 20/80 With correction: 20/40 20/30 20/30 Activities of Daily Living The home contains: no safety equipment. Patient does total self care Fall Risk Screen Fall Risk Assessment, last 12 mths 4/22/2019 Able to walk? Yes Fall in past 12 months? No  
 
 
Abuse Screen Patient is not abused Lives with  safe Screening EKG EKG order placed: No --declines just completed 12/18 Patient Care Team  
Patient Care Team: 
Rao Mason MD as PCP - General (Internal Medicine) Lobo Alfonso RN as Benefits Care Manager Margy Hidalgo MD (Ophthalmology) Martin Hagan MD (Obstetrics & Gynecology) Aman Chong MD (Gastroenterology)  
updated End of Life Planning Advanced care planning directives were discussed with the patient and /or family/caregiver. Assessment/Plan Education and counseling provided: 
Are appropriate based on today's review and evaluation End-of-Life planning (with patient's consent) Screening Mammography Screening Pap and pelvic (covered once every 2 years) Diabetes screening test 
 
Diagnoses and all orders for this visit: 1. Severe obesity (BMI 35.0-39. 9) with comorbidity ([address] 2. Prediabetes 3. Hypercholesterolemia 4. Essential hypertension 5. Welcome to Medicare preventive visit Other orders 
-     REFERRAL TO NUTRITION 
-     varicella-zoster recombinant, PF, (SHINGRIX, PF,) 50 mcg/0.5 mL susr injection; 0.5mL by IntraMUSCular route once now and then repeat in 2-6 months Health Maintenance Due Topic Date Due  Shingrix Vaccine Age 50> (1 of 2) 2004  MEDICARE YEARLY EXAM  2019  Pneumococcal 65+ years (1 of 2 - PCV13) 2019  
 FOOT EXAM Q1  2019  
   Discussed with patient about advance medical directive. Provided patient blank AMD and Your Right to Decide Booklet. Requested that if completed to provide a copy of AMD to office. ACP not on file. SDMs are her  (Fernando Evangelista) and son Bryon Petersen). Provided information again today. 
 
  
Colonoscopy: 16, Dr. Yeimi Hancock, repeat 10 years Pap: Dr. Teri Yeung, 10/9/18 q other year Mammogram: 18, negative annually AAA: no fmhx DEXA: 18, nl  Due  Tdap: 3/31/2014   
Pneumovax: 2017 [id number]: not yet needed Zostavax: 2015 Shingrix: ordered 18, reminded Flu shot: 18  
 
Eye exam: Dr. Tarsha Garner, 19, annual 
 
A1c:  6.4 q 3months Hep C screen: , negative Lipids:  LDL 48  Annually EK/18 Medication reconciliation completed by MA and reviewed by me. Medical/surgical/social/family history reviewed and updated by me. Patient provided AVS and preventative screening table. Patient verbalized understanding of all information discussed.

## 2019-04-22 NOTE — PATIENT INSTRUCTIONS
Medicare Part B Preventive Services Guidelines/Limitations Date last completed and Frequency Due Date Bone Mass Measurement 
(age 72 & older, biennial) Requires diagnosis related to osteoporosis or estrogen deficiency. Biennial benefit unless patient has history of long-term glucocorticoid tx or baseline is needed because initial test was by other method Completed 7/2018 
  
  
Recommended every 2 years Due 7/2020 Cardiovascular Screening Blood Tests (every 5 years) Total cholesterol, HDL, Triglycerides Order as a panel if possible Completed 4/2019 
  
Recommended annually Due 4/2020 Colorectal Cancer Screening 
-Fecal occult blood test (annual) -Flexible sigmoidoscopy (5y) 
-Screening colonoscopy (10y) -Barium Enema   Completed 8/11/16 with Dr Dominique Lerma 
  
Recommended repeat in 10 years  Due 8/2026 Counseling to Prevent Tobacco Use (up to 8 sessions per year) - Counseling greater than 3 and up to 10 minutes - Counseling greater than 10 minutes Patients must be asymptomatic of tobacco-related conditions to receive as preventive service N/A N/A Diabetes Screening Tests (at least every 3 years, Medicare covers annually or at 6-month intervals for prediabetic patients) 
  Fasting blood sugar (FBS) or glucose tolerance test (GTT) Patient must be diagnosed with one of the following: 
-Hypertension, Dyslipidemia, obesity, previous impaired FBS or GTT 
Or any two of the following: overweight, FH of diabetes, age ? 72, history of gestational diabetes, birth of baby weighing more than 9 pounds Completed 4/2019 with 6.4 
  
  
Recommended every 3-6 months for Pre-Diabetics and Diabetics Due 7/2019 - 10/2019 Diabetes Self-Management Training (DSMT) (no USPSTF recommendation) Requires referral by treating physician for patient with diabetes or renal disease. 10 hours of initial DSMT session of no less than 30 minutes each in a continuous 12-month period.   2 hours of follow-up DSMT in subsequent years. N/A N/A Glaucoma Screening (no USPSTF recommendation) Diabetes mellitus, family history, , age 48 or over,  American, age 72 or over Completed 2/2019 
  
Recommended annually Due 2/2020 Human Immunodeficiency Virus (HIV) Screening (annually for increased risk patients) HIV-1 and HIV-2 by EIA, NILE, rapid antibody test, or oral mucosa transudate Patient must be at increased risk for HIV infection per USPSTF guidelines or pregnant. Tests covered annually for patients at increased risk. Pregnant patients may receive up to 3 test during pregnancy. N/A N/A Medical Nutrition Therapy (MNT) (for diabetes or renal disease not recommended schedule) Requires referral by treating physician for patient with diabetes or renal disease. Can be provided in same year as diabetes self-management training (DSMT), and CMS recommends medical nutrition therapy take place after DSMT. Up to 3 hours for initial year and 2 hours in subsequent years. N/A N/A  
         
Seasonal Influenza Vaccination (annually)   Completed Fall 2018 
  
Recommended annually Due Fall 2019 Pneumococcal Vaccination (once after 65)   Pneumococcal 23 - 5/17/17 Recommended once over the age of 72 
  
Prevnar 15 - not yet completed Recommended once over the age of 72 Complete 
  
  
  
  
Due now Hepatitis B Vaccinations (if medium/high risk) Medium/high risk factors:  End-stage renal disease, Hemophiliacs who received Factor VIII or IX concentrates, Clients of institutions for the mentally retarded, Persons who live in the same house as a HepB virus carrier, Homosexual men, Illicit injectable drug abusers. N/A N/A Screening Mammography (biennial age 54-69)? Annually (age 36 or over) Completed 7/2018 
  
Recommended annually 
  Due 7/2019 Screening Pap Tests and Pelvic Examination (up to age 79 and after 79 if unknown history or abnormal study last 10 years) Every 24 months except high risk Completed 10/9/18 
  
 Recommended every other year Due 10/2020 Ultrasound Screening for Abdominal Aortic Aneurysm (AAA) (once) Patient must be referred through IPPE and not have had a screening for abdominal aortic aneurysm before under Medicare. Limited to patients who meet one of the following criteria: 
- Men who are 73-68 years old and have smoked more than 100 cigarettes in their lifetime. 
-Anyone with a FH of AAA 
-Anyone recommended for screening by USPSTF N/A N/A Medicare Wellness Visit, Female The best way to live healthy is to have a lifestyle where you eat a well-balanced diet, exercise regularly, limit alcohol use, and quit all forms of tobacco/nicotine, if applicable. Regular preventive services are another way to keep healthy. Preventive services (vaccines, screening tests, monitoring & exams) can help personalize your care plan, which helps you manage your own care. Screening tests can find health problems at the earliest stages, when they are easiest to treat. [facility] follows the current, evidence-based guidelines published by the [facility] States Ernesto Aceves ([facility]) when recommending preventive services for our patients. Because we follow these guidelines, sometimes recommendations change over time as research supports it. (For example, mammograms used to be recommended annually. Even though Medicare will still pay for an annual mammogram, the newer guidelines recommend a mammogram every two years for women of average risk.) Of course, you and your doctor may decide to screen more often for some diseases, based on your risk and your health status. Preventive services for you include: - Medicare offers their members a free annual wellness visit, which is time for you and your primary care provider to discuss and plan for your preventive service needs.  Take advantage of this benefit every year! 
-All adults over the age of 72 should receive the recommended pneumonia vaccines. Current USPSTF guidelines recommend a series of two vaccines for the best pneumonia protection.  
-All adults should have a flu vaccine yearly and a tetanus vaccine every 10 years. All adults age 61 and older should receive a shingles vaccine once in their lifetime.   
-A bone mass density test is recommended when a woman turns 65 to screen for osteoporosis. This test is only recommended one time, as a screening. Some providers will use this same test as a disease monitoring tool if you already have osteoporosis. -All adults age 38-68 who are overweight should have a diabetes screening test once every three years.  
-Other screening tests and preventive services for persons with diabetes include: an eye exam to screen for diabetic retinopathy, a kidney function test, a foot exam, and stricter control over your cholesterol.  
-Cardiovascular screening for adults with routine risk involves an electrocardiogram (ECG) at intervals determined by your doctor.  
-Colorectal cancer screenings should be done for adults age 54-65 with no increased risk factors for colorectal cancer. There are a number of acceptable methods of screening for this type of cancer. Each test has its own benefits and drawbacks. Discuss with your doctor what is most appropriate for you during your annual wellness visit. The different tests include: colonoscopy (considered the best screening method), a fecal occult blood test, a fecal DNA test, and sigmoidoscopy. -Breast cancer screenings are recommended every other year for women of normal risk, age 54-69. 
-Cervical cancer screenings for women over age 72 are only recommended with certain risk factors.  
-All adults born between [facility] should be screened once for Hepatitis C. Here is a list of your current Health Maintenance items (your personalized list of preventive services) with a due date: 
Health Maintenance Due Topic Date Due  
  Shingles Vaccine (1 of 2) 03/11/2004 [facility] Annual Well Visit  03/01/2019  Pneumococcal Vaccine (1 of 2 - PCV13) 03/11/2019 [facility] Diabetic Foot Care  04/17/2019 Please bring in a copy of your advanced directive to your next office visit so we can have a copy on file.

## 2019-04-22 NOTE — PROGRESS NOTES
HISTORY OF PRESENT ILLNESS Alexandrea Galvan is a 71 y.o. female. HPI Last here 3/1/19. Pt is here to f/u on chronic conditions. 
   
BP is 121/74 Continues on lisinopril-HCTZ 10-12.5mg daily and metoprolol 50mg BID 
   
BS at home running around 145-150 in the AM fasting Better in the afternoon Continues on metformin 500mg once daily Recall increased metformin caused diarrhea 
   
Wt today is 268 lbs --up 2 lbs x lov Pt was briefly doing [employer] but she stopped going Provided referral for nutritionist 
Discussed diabetic friendly diet Pt has been trying to walk more, a few times per week Discussed diet and w/l  
  
Reviewed labs  Discussed seeing derm for routine skin check 
   
Continues on pravachol 20mg daily for cholesterol  
   
No longer on zoloft 50mg after a rash Pt did not take lexapro, states that she is doing well off medications Not overly anxious or depressed Had a lot of stress in general at home however Pt's father passed away recently but she is going through the nl grieving process 
  
Reviewed stress ECHO : nl 
 
Has a glass of wine on weekends Lives with her , this is a safe environment Fully functional independently No memory concerns 
  
ACP not on file. SDMs are her  (Fernando Evangelista) and son Jamaal Snow). Provided information again today. PREVENTIVE:   
Colonoscopy: 16, Dr. Yaneth Awad, repeat 10 years Pap: Dr. Yany Woody, 10/9/18 Mammogram: 18, negative AAA: no fmhx DEXA: 18, nl 
Tdap: 3/31/2014   
Pneumovax: 2017 [id number]: not yet needed Zostavax: 2015 Shingrix: ordered 18, reminded Flu shot: 18  
Foot exam: 19 Microalbumin:  A1c:1/15 6.1,  6.4, 10/16 6.0,  6.5,  6.2,  5.6,  5.9,  6.0,  5.9,  6.1,  6.4 Eye exam: Dr. Doreen Morales, 19, reviewed notes - blurry vision Hep C screen: , negative Lipids:  LDL 48 EK/18 Patient Active Problem List  
 Diagnosis Date Noted  Severe obesity (BMI 35.0-39. 9) with comorbidity ([address] 04/17/2018  Obesity (BMI 35.0-39.9 without comorbidity) 12/13/2017  Prediabetes 01/06/2016  Hypercholesterolemia  Hypertension  Glucose intolerance (impaired glucose tolerance)  Depression Current Outpatient Medications Medication Sig Dispense Refill  metoprolol tartrate (LOPRESSOR) 50 mg tablet TAKE 1 TAB BY MOUTH TWO (2) TIMES A DAY. 180 Tab 1  
 glucose blood VI test strips (ONETOUCH ULTRA BLUE TEST STRIP) strip Use to check blood sugar once daily 100 Strip 11  
 lancets (ONETOUCH ULTRASOFT LANCETS) misc Use to check blood sugar once daily 1 Package 11  
 pravastatin (PRAVACHOL) 20 mg tablet Take 1 Tab by mouth daily. 90 Tab 1  
 lisinopril-hydroCHLOROthiazide (PRINZIDE, ZESTORETIC) 10-12.5 mg per tablet Take 1 Tab by mouth daily. 90 Tab 0  
 metFORMIN ER (GLUCOPHAGE XR) 500 mg tablet TAKE 2 TABS BY MOUTH DAILY (WITH DINNER). 180 Tab 0  
 aspirin delayed-release (ASPIR-81) 81 mg tablet Take 1 Tab by mouth daily. 90 Tab 3 Past Surgical History:  
Procedure Laterality Date  COLONOSCOPY N/A 8/11/2016 COLONOSCOPY performed by Rajendra Whitt MD at [facility] ENDOSCOPY  
 HX BREAST BIOPSY Left Surgical Bx - years ago - Benign  US GUIDE ASP BREAST LT CYST W NDL Left   
 years ago  benign Lab Results Component Value Date/Time WBC 6.9 04/18/2019 09:35 AM  
 HGB 13.7 04/18/2019 09:35 AM  
 HCT 40.8 04/18/2019 09:35 AM  
 PLATELET 838 03/84/7962 09:35 AM  
 MCV 89 04/18/2019 09:35 AM  
 
Lab Results Component Value Date/Time Cholesterol, total 128 04/18/2019 09:35 AM  
 HDL Cholesterol 38 (L) 04/18/2019 09:35 AM  
 LDL, calculated 48 04/18/2019 09:35 AM  
 Triglyceride 208 (H) 04/18/2019 09:35 AM  
 CHOL/HDL Ratio 4.3 08/16/2010 01:49 PM  
 
Lab Results Component Value Date/Time  GFR est non-AA 90 04/18/2019 09:35 AM  
 GFR est  04/18/2019 09:35 AM  
 Creatinine 0.71 04/18/2019 09:35 AM  
 BUN 12 04/18/2019 09:35 AM  
 Sodium 140 04/18/2019 09:35 AM  
 Potassium 4.5 04/18/2019 09:35 AM  
 Chloride 102 04/18/2019 09:35 AM  
 CO2 24 04/18/2019 09:35 AM  
  
Review of Systems Respiratory: Negative for shortness of breath. Cardiovascular: Negative for chest pain. Physical Exam  
Constitutional: She is oriented to person, place, and time. She appears well-developed and well-nourished. No distress. HENT:  
Head: Normocephalic and atraumatic. Right Ear: External ear normal.  
Left Ear: External ear normal.  
Mouth/Throat: Oropharynx is clear and moist. No oropharyngeal exudate. Eyes: Conjunctivae and EOM are normal. Right eye exhibits no discharge. Left eye exhibits no discharge. Neck: Normal range of motion. Neck supple. No carotid bruits Cardiovascular: Normal rate, regular rhythm, normal heart sounds and intact distal pulses. Exam reveals no gallop and no friction rub. No murmur heard. Pulmonary/Chest: Effort normal and breath sounds normal. No respiratory distress. She has no wheezes. She has no rales. She exhibits no tenderness. Abdominal: Soft. She exhibits no distension and no mass. There is no tenderness. There is no rebound and no guarding. Multiple angiomas Musculoskeletal: Normal range of motion. She exhibits edema (Trace BLE). She exhibits no tenderness or deformity. Lymphadenopathy:  
  She has no cervical adenopathy. Neurological: She is alert and oriented to person, place, and time. Coordination normal.  
Monofilament nl BLE, good peripheral pulses, no ulcers Skin: Skin is warm and dry. No rash noted. She is not diaphoretic. No erythema. No pallor. Psychiatric: She has a normal mood and affect. Her behavior is normal.  
 
 
ASSESSMENT and PLAN 
  ICD-10-CM ICD-9-CM 1. Severe obesity (BMI 35.0-39. 9) with comorbidity ([address] Discussed need for w/l, she would like to see a nutritionist, placed referral to Eli[facility] Jefferson H27.20 840.31 METABOLIC PANEL, BASIC HEMOGLOBIN A1C WITH EAG  
   TSH 3RD GENERATION 2. Hypercholesterolemia Controlled on pravachol 20mg daily S99.94 525.1 METABOLIC PANEL, BASIC HEMOGLOBIN A1C WITH EAG  
   TSH 3RD GENERATION 3. Essential hypertension Controlled on lisinopril-HCTZ and metoprolol 50mg BID Z71 046.9 METABOLIC PANEL, BASIC HEMOGLOBIN A1C WITH EAG  
   TSH 3RD GENERATION 4. Welcome to Medicare preventive visit D57.90 V98.5 METABOLIC PANEL, BASIC HEMOGLOBIN A1C WITH EAG  
   TSH 3RD GENERATION 5. Type 2 diabetes mellitus without complication, without long-term current use of insulin ([facility] [address] a1c controlled on metformin 500mg once daily, unable to tolerate higher doses d/t GI upset E11.9 250.00 REFERRAL TO NUTRITION  
    DIABETES FOOT EXAM  
   METABOLIC PANEL, BASIC HEMOGLOBIN A1C WITH EAG  
   TSH 3RD GENERATION 6. Papule Pt needs routine skin check, refer to derm R23.8 709.8 REFERRAL TO DERMATOLOGY METABOLIC PANEL, BASIC HEMOGLOBIN A1C WITH EAG  
   TSH 3RD GENERATION  
 dep screen neg Scribed by Anna Hartley of [address] [place] [address] [address], as dictated by Dr. Mich Forrest. Current diagnosis and concerns discussed with pt at length. Pt understands risks and benefits or current treatment plan and medications, and accepts the treatment and medication with any possible risks. Pt asks appropriate questions, which were answered. Pt was instructed to call with any concerns or problems. I have reviewed the note documented by the scribe. The services provided are my own. The documentation is accurate

## 2019-04-27 DIAGNOSIS — E11.9 TYPE 2 DIABETES MELLITUS WITHOUT COMPLICATION, WITHOUT LONG-TERM CURRENT USE OF INSULIN (HCC): ICD-10-CM

## 2019-04-27 DIAGNOSIS — R73.02 GLUCOSE INTOLERANCE (IMPAIRED GLUCOSE TOLERANCE): ICD-10-CM

## 2019-04-28 RX ORDER — METFORMIN HYDROCHLORIDE 500 MG/1
TABLET, EXTENDED RELEASE ORAL
Qty: 180 TAB | Refills: 0 | Status: SHIPPED | OUTPATIENT
Start: 2019-04-28 | End: 2020-01-17

## 2019-04-29 RX ORDER — LANCETS
EACH MISCELLANEOUS
Qty: 1 PACKAGE | Refills: 11 | Status: SHIPPED | OUTPATIENT
Start: 2019-04-29 | End: 2020-01-21 | Stop reason: SDUPTHER

## 2019-04-29 RX ORDER — METFORMIN HYDROCHLORIDE 500 MG/1
500 TABLET, EXTENDED RELEASE ORAL
Qty: 180 TAB | Refills: 0 | Status: SHIPPED | OUTPATIENT
Start: 2019-04-29 | End: 2019-07-24 | Stop reason: SDUPTHER

## 2019-04-29 NOTE — TELEPHONE ENCOUNTER
PCP: Tapan Jeronimo MD    Last appt: 4/22/2019  Future Appointments   Date Time Provider Brandon Eugenei   5/2/2019  2:30 PM Napoles Medardo [facility]   7/24/2019  3:00 PM [facility]       Requested Prescriptions     Pending Prescriptions Disp Refills    metFORMIN ER (GLUCOPHAGE XR) 500 mg tablet 180 Tab 0    lancets (ONETOUCH ULTRASOFT LANCETS) misc 1 Package 11     Sig: Use to check blood sugar once daily    glucose blood VI test strips (ONETOUCH ULTRA BLUE TEST STRIP) strip 100 Strip 11     Sig: Use to check blood sugar once daily

## 2019-05-02 ENCOUNTER — HOSPITAL ENCOUNTER (OUTPATIENT)
Dept: NUTRITION | Age: 64
Discharge: HOME | End: 2019-05-02
Payer: MEDICARE

## 2019-05-02 PROCEDURE — 97802 MEDICAL NUTRITION INDIV IN: CPT | Performed by: DIETITIAN, REGISTERED

## 2019-05-02 NOTE — PROGRESS NOTES
[address] [address] [address] [facility], [address] [place] [address] Dr RIGGINS, [address] [facility] [place] Raghavendra Salas [address]  Phone: [phone] Fax: [phone]   Nutrition Assessment - Medical Nutrition Therapy   Outpatient Initial Evaluation         Patient Name: Marion Carpenter : 1955   Treatment Diagnosis: Diabetes Type 2   Referral Source: Miladys Trejo MD Start of Care [facility]): 2019     Gender: female Age: 71 y.o. Ht: 70 in Wt: 266.2 lb  kg   BMI: 38.2 RMR   Male  RMR Female 1833   Anthropometrics Assessment: Per BMI, pt is considered obese. Moderate abdominal adiposity is evident based on visual observation     Past Medical History includes: High cholesterol, HTN, Depression     Pertinent Medications:   Metformin, Metoprolol, Pravastatin, Lisinopril HCTZ, Yuridia Asprin   Biochemical Data:   Lab Results   Component Value Date/Time    Hemoglobin A1c 6.4 (H) 2019 09:35 AM    Hemoglobin A1c (POC) 5.6 2014 03:14 PM     Lab Results   Component Value Date/Time    Cholesterol, total 128 2019 09:35 AM    HDL Cholesterol 38 (L) 2019 09:35 AM    LDL, calculated 48 2019 09:35 AM    VLDL, calculated 42 (H) 2019 09:35 AM    Triglyceride 208 (H) 2019 09:35 AM    CHOL/HDL Ratio 4.3 2010 01:49 PM        Nutrition Diagnosis Food and nutrition related knowledge deficit R/T lack of prior exposure to information especially related to food and Diabetes AEB pt account and A1C 6.4%     Subjective Assessment: Pt is a 71yo female here today for help with weight loss and control of blood sugars. She currently checks her blood sugars sometimes during the day. 119-140s before breakfast and up to 170s 4 hrs after dinner. She currently exercises using a walking tape 1-3 times per week. 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  Not able to eat or drink consistently during work and may go 6+ hours without eating. She notes high levels of stress due to loss of her parents and stress eating when she gets home. She has had success with weight loss in the past with Weight Watchers but gains the weight back after stopping behaviors. Current Eating   Patterns: B- oatmeal with banana OR 4 turkey links and 1 barry OR Kim Protein bar  S- none  L- Wilma's salad with grilled chicken 2 packets dressing and diet soda  OR lean cuisine with brussels sprouts OR out to [place] for fajitas, some rice, 2 tortillas, and some chips OR progresso light soup with 1/2 sleeve of saltines  S- popcorn or pretzles (emotional eating)  D- out 3 times per week at [facility] (fried chicken on salad OR baked potato and chicken sandwich) OR [employer] (3 cups spaghetti, no bread)  Drinks: diet soda, 3 cups coffee with equal (4 per coffee), water, wine 6oz per night, 3 glasses on weekends     Estimate Needs   Calories: 1830 Protein: 114 Carbs: 206 Fat: 61   Kcal/day  g/day  g/day  g/day        percent: 25  45  30               Education & Recommendations provided: Educated pt on the pathophysiology of Type II Diabetes, insulin resistance and the rationale for dietary modifications and increased activity. Educated pt on carbohydrate food sources, counting carbohydrates, label reading, meal timing, and appropriate serving sizes. Encouraged pt to avoid sugary beverages including wine in excess. Worked with pt to set carb goals fo 45-55g per meal with meal and snack ideas. Encouraged keeping food log. Set exercise goals.    Handouts Provided: [x]  Carbohydrates  [x]  Protein  []  Fiber  []  Serving Sizes  [x]  Meal and Snack Ideas  [x]  Food Journals [x]  Diabetes  []  Cholesterol  []  Sodium  [x]  Gen Nutr Guidelines  []  SBGM Guidelines  []  Others:   Information Reviewed with: pt   Readiness to Change Stage: []  Pre-contemplative    []  Contemplative  [x]  Preparation               []  Action                  [] Maintenance   Potential Barriers to Learning: []  Decline in memory    []  Language barrier   []  Other:  [x]  Emotional                  []  Limited mobility  []  Lack of motivation     [] Vision, hearing or cognitive impairment   Expected Compliance: Good /     Nutritional Goal - To promote lifestyle changes to result in:    [x]  Weight loss  [x]  Improved diabetic control  []  Decreased cholesterol levels  []  Decreased blood pressure  []  Weight maintenance []  Preventing any interactions associated with food allergies  []  Adequate weight gain toward goal weight  []  Other:        Patient Goals:  SMART goals - Improve consistency of CHO intake w/goal to plan meals with 45-55 gm CHO/meal and 1-2 optional snack of 15-20 gm. - Improve physical activity w/goal to walk or garden 3 times per week. - Increase accountability and awareness of food choices by recording food and beverage intake by using a food journal at least 3 days per week.     - Reduce wine intake to only on weekends   Total Treatment Time: 60min   Dietitian Signature: Greg Bruno MS RD Date: 5/2/2019   Follow-up: 1 month Time: 3:51 PM

## 2019-05-22 RX ORDER — LISINOPRIL AND HYDROCHLOROTHIAZIDE 10; 12.5 MG/1; MG/1
TABLET ORAL
Qty: 90 TAB | Refills: 0 | Status: SHIPPED | OUTPATIENT
Start: 2019-05-22 | End: 2019-08-26 | Stop reason: SDUPTHER

## 2019-06-10 ENCOUNTER — HOSPITAL ENCOUNTER (OUTPATIENT)
Dept: NUTRITION | Age: 64
Discharge: HOME | End: 2019-06-10
Payer: MEDICARE

## 2019-06-10 PROCEDURE — 97803 MED NUTRITION INDIV SUBSEQ: CPT | Performed by: DIETITIAN, REGISTERED

## 2019-06-10 NOTE — PROGRESS NOTES
NUTRITION  FOLLOW-UP TREATMENT NOTE  Patient Name: Alexandrea Greer         Date: 6/10/2019  : 1955    YES Patient  Verified  Diagnosis: Diabetes Type 2   In time:   2:00pm            Out time:   2:30pm   Total Treatment Time (min):   30     SUBJECTIVE/ASSESSMENT    Changes in medication or medical history? Any new allergies, surgeries or procedures? NO    If yes, update Summary List   Pt has returned for follow up. She initially was compliant with keeping food log, reducing portion sizes based on carbohydrate amount, exercising, and keeping wine to weekends. She states weight decresaed by 4-5 lbs. Multiple holidays in past month made it difficult to continue. She plans to return to goals as keeping log helped greatly with choices and accountability. Worked with pt to make plan for future holidays. Portion sizes are an issue with certain foods, especially homemade pasta (5 cups), potatoes, macaroni, and buns. She typically makes own spaghetti once per month or less. Provided suggestions of using smaller dish to eat out of or splitting portion into lunch and dinner to decrease total carbohydrates eaten at sitting. She agreed. She expressed comprehension, high motivation, and compliance is expected. Pt reported confidence of 10 out of 10. Current Wt: 265.2 Previous Wt: 266.2 Wt Change: -1.0     Achievement of Goals: - Improve consistency of CHO intake w/goal to plan meals with 45-55 gm CHO/meal and 1-2 optional snack of 15-20 gm. - improved initially. Compliancy struggle last month. Continued.     - Improve physical activity w/goal to walk or garden 3 times per week. - met initially. Continued.     - Increase accountability and awareness of food choices by recording food and beverage intake by using a food journal at least 3 days per week. - met. Continued.     - Reduce wine intake to only on weekends- met. Continued.          Patient Education:  [x]  Review current plan with patient   []  Other: Handouts/  Information Provided: []  Carbohydrates  []  Protein  []  Fiber  []  Serving Sizes  []  Fluids  []  General guidelines []  Diabetes  []  Cholesterol  []  Sodium  []  SBGM  []  Food Journals  []  Others:      New Patient Goals: - pasta portion: split serving into 2 meals OR try smaller dish to eat out of  -July 4th: limit carbohydrate amount based on plan  -return to food log daily   -exercise: return to 3 days per week  -continue wine on weekends only     PLAN    [x]  Continue on current plan []  Follow-up PRN   []  Discharge due to :    [x]  Next appt: 6 weeks     Dietitian: Nani Aquino MS RD    Date: 6/10/2019 Time: 5:15 PM

## 2019-06-17 NOTE — TELEPHONE ENCOUNTER
PCP: Lisa Murguia MD    Last appt: 12/17/2018  Future Appointments   Date Time Provider Brandon Nowak   4/22/2019  3:00 PM Lisa Murguia MD [facility]       Requested Prescriptions     Pending Prescriptions Disp Refills    pravastatin (PRAVACHOL) 20 mg tablet 90 Tab 1     Sig: Take 1 Tab by mouth daily.  lisinopril-hydroCHLOROthiazide (PRINZIDE, ZESTORETIC) 10-12.5 mg per tablet 90 Tab 1     Sig: Take 1 Tab by mouth daily. no

## 2019-07-18 ENCOUNTER — HOSPITAL ENCOUNTER (OUTPATIENT)
Dept: LAB | Age: 64
Discharge: HOME | End: 2019-07-18
Payer: MEDICARE

## 2019-07-18 DIAGNOSIS — E11.9 TYPE 2 DIABETES MELLITUS WITHOUT COMPLICATION, WITHOUT LONG-TERM CURRENT USE OF INSULIN (HCC): ICD-10-CM

## 2019-07-18 DIAGNOSIS — Z00.00 WELCOME TO MEDICARE PREVENTIVE VISIT: ICD-10-CM

## 2019-07-18 DIAGNOSIS — I10 ESSENTIAL HYPERTENSION: ICD-10-CM

## 2019-07-18 DIAGNOSIS — E66.01 SEVERE OBESITY (BMI 35.0-39.9) WITH COMORBIDITY (HCC): ICD-10-CM

## 2019-07-18 DIAGNOSIS — E78.00 HYPERCHOLESTEROLEMIA: ICD-10-CM

## 2019-07-18 DIAGNOSIS — R23.8 PAPULE: ICD-10-CM

## 2019-07-18 PROCEDURE — 36415 COLL VENOUS BLD VENIPUNCTURE: CPT

## 2019-07-18 PROCEDURE — 84443 ASSAY THYROID STIM HORMONE: CPT

## 2019-07-18 PROCEDURE — 83036 HEMOGLOBIN GLYCOSYLATED A1C: CPT

## 2019-07-18 PROCEDURE — 80048 BASIC METABOLIC PNL TOTAL CA: CPT

## 2019-07-19 LAB
BUN SERPL-MCNC: 17 MG/DL (ref 8–27)
BUN/CREAT SERPL: 23 (ref 12–28)
CALCIUM SERPL-MCNC: 10.3 MG/DL (ref 8.7–10.3)
CHLORIDE SERPL-SCNC: 101 MMOL/L (ref 96–106)
CO2 SERPL-SCNC: 26 MMOL/L (ref 20–29)
CREAT SERPL-MCNC: 0.73 MG/DL (ref 0.57–1)
EST. AVERAGE GLUCOSE BLD GHB EST-MCNC: 128 MG/DL
GLUCOSE SERPL-MCNC: 91 MG/DL (ref 65–99)
HBA1C MFR BLD: 6.1 % (ref 4.8–5.6)
POTASSIUM SERPL-SCNC: 4.5 MMOL/L (ref 3.5–5.2)
SODIUM SERPL-SCNC: 142 MMOL/L (ref 134–144)
TSH SERPL DL<=0.005 MIU/L-ACNC: 0.26 UIU/ML (ref 0.45–4.5)

## 2019-07-24 ENCOUNTER — HOSPITAL ENCOUNTER (OUTPATIENT)
Dept: LAB | Age: 64
Discharge: HOME | End: 2019-07-24
Payer: MEDICARE

## 2019-07-24 ENCOUNTER — OFFICE VISIT (OUTPATIENT)
Dept: INTERNAL MEDICINE CLINIC | Age: 64
End: 2019-07-24

## 2019-07-24 VITALS
TEMPERATURE: 98.5 F | DIASTOLIC BLOOD PRESSURE: 80 MMHG | OXYGEN SATURATION: 97 % | RESPIRATION RATE: 16 BRPM | BODY MASS INDEX: 39.69 KG/M2 | HEART RATE: 74 BPM | HEIGHT: 69 IN | SYSTOLIC BLOOD PRESSURE: 121 MMHG | WEIGHT: 268 LBS

## 2019-07-24 DIAGNOSIS — E11.9 TYPE 2 DIABETES MELLITUS WITHOUT COMPLICATION, WITHOUT LONG-TERM CURRENT USE OF INSULIN (HCC): ICD-10-CM

## 2019-07-24 DIAGNOSIS — Z12.39 BREAST SCREENING: ICD-10-CM

## 2019-07-24 DIAGNOSIS — E11.8 CONTROLLED TYPE 2 DIABETES MELLITUS WITH COMPLICATION, WITHOUT LONG-TERM CURRENT USE OF INSULIN (HCC): ICD-10-CM

## 2019-07-24 DIAGNOSIS — E66.01 SEVERE OBESITY (BMI 35.0-39.9) WITH COMORBIDITY (HCC): ICD-10-CM

## 2019-07-24 DIAGNOSIS — Z23 ENCOUNTER FOR IMMUNIZATION: ICD-10-CM

## 2019-07-24 DIAGNOSIS — E78.00 HYPERCHOLESTEROLEMIA: ICD-10-CM

## 2019-07-24 DIAGNOSIS — R73.02 GLUCOSE INTOLERANCE (IMPAIRED GLUCOSE TOLERANCE): ICD-10-CM

## 2019-07-24 DIAGNOSIS — F32.9 REACTIVE DEPRESSION: ICD-10-CM

## 2019-07-24 DIAGNOSIS — I10 ESSENTIAL HYPERTENSION: Primary | ICD-10-CM

## 2019-07-24 DIAGNOSIS — E05.90 HYPERTHYROIDISM, SUBCLINICAL: ICD-10-CM

## 2019-07-24 PROBLEM — E66.9 OBESITY (BMI 35.0-39.9 WITHOUT COMORBIDITY): Status: RESOLVED | Noted: 2017-12-13 | Resolved: 2019-07-24

## 2019-07-24 PROCEDURE — 86800 THYROGLOBULIN ANTIBODY: CPT

## 2019-07-24 PROCEDURE — 84443 ASSAY THYROID STIM HORMONE: CPT

## 2019-07-24 PROCEDURE — 84439 ASSAY OF FREE THYROXINE: CPT

## 2019-07-24 PROCEDURE — 36415 COLL VENOUS BLD VENIPUNCTURE: CPT

## 2019-07-24 RX ORDER — METFORMIN HYDROCHLORIDE 500 MG/1
500 TABLET, EXTENDED RELEASE ORAL 2 TIMES DAILY WITH MEALS
Qty: 180 TAB | Refills: 1 | Status: SHIPPED | OUTPATIENT
Start: 2019-07-24 | End: 2019-11-20

## 2019-07-24 NOTE — PROGRESS NOTES
HISTORY OF PRESENT ILLNESS  Alexandrea Crocker Patient is a 71 y.o. female. HPI   Last here 19. Pt is here to f/u on chronic conditions.      BP is 121/80  Continues on lisinopril-HCTZ 10-12.5mg daily and metoprolol 50mg BID  No home readings      BS at home running 150s, sometimes 130s in the AM   Advised to also check BS in the afternoon   Continues on metformin 500mg  BID, 1 in the AM, 1 in the PM   Recall increased metformin caused diarrhea      Wt today is 268 lbs --stable x lov   Pt has not been as active recently due to heat   Pt previously did [employer]   Pt did f/u with nutritionist which helped some   Discussed starting ozempic or trulicity to aid in wt loss--pt would like to try on her own to lose wt before trying GLP1 agonist   Discussed importance of w/l with diabetes   Discussed diet and w/l      Reviewed labs     pt will schedule appt with derm for skin check       Continues on pravachol 20mg daily for cholesterol       No longer on zoloft 50mg after a rash  Pt did not take lexapro, states that she is doing well off medications   Pt states she is doing ok without it       ACP not on file. SDMs are her  (Fernando Evangelista) and son Antonio Veras).   Provided information again today.     PREVENTIVE:    Colonoscopy: 16, Dr. Angelita Gutierrez, repeat 10 years  Pap: Dr. Fadumo Garcia: 18, negative, due   AAA: no fmhx  DEXA: 18, nl  Tdap: 3/31/2014    Pneumovax: 2017  Owduzlf33: 19  Zostavax: 2015  Shingrix: ordered 18, reminded  Flu shot: 18   Foot exam: 19   Microalbumin:   A1c:1/15 6.1,  6.4, 10/16 6.0,  6.5,  6.2,  5.6,  5.9,  6.0,  5.9,  6.1,  6.4,  6.1   Eye exam: Dr. Natalie Kovacs, 19, reviewed notes - blurry vision  Hep C screen: , negative  Lipids:  LDL 48  EK/18       Patient Active Problem List    Diagnosis Date Noted    Controlled type 2 diabetes mellitus with complication, without long-term current use of insulin (HCC)     Severe obesity (BMI 35.0-39. 9) with comorbidity ([facility] [address] 04/17/2018    Obesity (BMI 35.0-39.9 without comorbidity) 12/13/2017    Prediabetes 01/06/2016    Hypercholesterolemia     Hypertension     Glucose intolerance (impaired glucose tolerance)     Depression      Current Outpatient Medications   Medication Sig Dispense Refill    lisinopril-hydroCHLOROthiazide (PRINZIDE, ZESTORETIC) 10-12.5 mg per tablet TAKE 1 TABLET BY MOUTH EVERY DAY 90 Tab 0    metFORMIN ER (GLUCOPHAGE XR) 500 mg tablet Take 1 Tab by mouth daily (with dinner). 180 Tab 0    lancets (ONETOUCH ULTRASOFT LANCETS) misc Use to check blood sugar once daily 1 Package 11    glucose blood VI test strips (ONETOUCH ULTRA BLUE TEST STRIP) strip Use to check blood sugar once daily 100 Strip 11    metFORMIN ER (GLUCOPHAGE XR) 500 mg tablet TAKE 2 TABS BY MOUTH DAILY (WITH DINNER). 180 Tab 0    varicella-zoster recombinant, PF, (SHINGRIX, PF,) 50 mcg/0.5 mL susr injection 0.5mL by IntraMUSCular route once now and then repeat in 2-6 months 0.5 mL 1    metoprolol tartrate (LOPRESSOR) 50 mg tablet TAKE 1 TAB BY MOUTH TWO (2) TIMES A DAY. 180 Tab 1    pravastatin (PRAVACHOL) 20 mg tablet Take 1 Tab by mouth daily. 90 Tab 1    aspirin delayed-release (ASPIR-81) 81 mg tablet Take 1 Tab by mouth daily.  90 Tab 3     Past Surgical History:   Procedure Laterality Date    COLONOSCOPY N/A 8/11/2016    [facility]OSCOPY    HX BREAST BIOPSY Left     Surgical Bx - years ago - Benign    US GUIDE ASP BREAST LT CYST W NDL Left     years ago  benign      Lab Results   Component Value Date/Time    WBC 6.9 04/18/2019 09:35 AM    HGB 13.7 04/18/2019 09:35 AM    HCT 40.8 04/18/2019 09:35 AM    PLATELET 222 91/00/9926 09:35 AM    MCV 89 04/18/2019 09:35 AM     Lab Results   Component Value Date/Time    Cholesterol, total 128 04/18/2019 09:35 AM    HDL Cholesterol 38 (L) 04/18/2019 09:35 AM    LDL, calculated 48 04/18/2019 09:35 AM    Triglyceride 208 (H) 04/18/2019 09:35 AM    CHOL/HDL Ratio 4.3 08/16/2010 01:49 PM     Lab Results   Component Value Date/Time    GFR est non-AA 87 07/18/2019 01:07 PM    GFR est  07/18/2019 01:07 PM    Creatinine 0.73 07/18/2019 01:07 PM    BUN 17 07/18/2019 01:07 PM    Sodium 142 07/18/2019 01:07 PM    Potassium 4.5 07/18/2019 01:07 PM    Chloride 101 07/18/2019 01:07 PM    CO2 26 07/18/2019 01:07 PM        Review of Systems   Respiratory: Negative for shortness of breath. Cardiovascular: Negative for chest pain. Physical Exam   Constitutional: She is oriented to person, place, and time. She appears well-developed and well-nourished. No distress. HENT:   Head: Normocephalic and atraumatic. Mouth/Throat: Oropharynx is clear and moist. No oropharyngeal exudate. Eyes: Conjunctivae and EOM are normal. Right eye exhibits no discharge. Left eye exhibits no discharge. Neck: Normal range of motion. Neck supple. Cardiovascular: Normal rate, regular rhythm and normal heart sounds. Exam reveals no gallop and no friction rub. No murmur heard. Pulmonary/Chest: Effort normal and breath sounds normal. No respiratory distress. She has no wheezes. She has no rales. She exhibits no tenderness. Musculoskeletal: Normal range of motion. She exhibits edema (trace). She exhibits no tenderness or deformity. Lymphadenopathy:     She has no cervical adenopathy. Neurological: She is alert and oriented to person, place, and time. Coordination normal.   Skin: Skin is warm and dry. No rash noted. She is not diaphoretic. No erythema. No pallor. Psychiatric: She has a normal mood and affect. Her behavior is normal.       ASSESSMENT and PLAN    ICD-10-CM ICD-9-CM    1. Essential hypertension            BP well controlled on lisinopril, HCTZ, and metoporol, continue  I10 401.9    2.  Controlled type 2 diabetes mellitus with complication, without long-term current use of insulin ([address]            Discussed that her home readings are in diabetic range, would like to meet with Neris Medley for diabetic education, continue metformin, a1c at goal, will have her check sugars in the evening a few times to see if these are better than fasted readings  E11.8 250.90    3. Severe obesity (BMI 35.0-39. 9) with comorbidity ([address]            Wt not improved, again discussed GLP1 agonist, she will think about this, discussed [employer] as well, pt has difficulty staying motivated  E66.01 278.01    4. Reactive depression          Overall doing well w/o meds, has lexapro at home in case she decides to try  F32.9 300.4    5. Hypercholesterolemia            Controlled on pravachol  E78.00 272.0    6. Hyperthyroidism, subclinical            TSH mildly suppressed, will repeat TFT today to see if additional tx needed  E05.90 242.90 T4, FREE      TSH 3RD GENERATION      THYROID ANTIBODY PANEL   7. Breast screening Z12.31 V76.10 [facility] BI SCREENING INCL CAD          9. Type 2 diabetes mellitus without complication, without long-term current use of insulin (HCC)        See above  E11.9 250.00 glucose blood VI test strips (ONETOUCH ULTRA BLUE TEST STRIP) strip          Scribed by Delia Brandon of [facility], as dictated by Dr. Tarry Holstein. Current diagnosis and concerns discussed with pt at length. Pt understands risks and benefits or current treatment plan and medications, and accepts the treatment and medication with any possible risks. Pt asks appropriate questions, which were answered. Pt was instructed to call with any concerns or problems. I have reviewed the note documented by the scribe. The services provided are my own.   The documentation is accurate

## 2019-07-24 NOTE — PATIENT INSTRUCTIONS
Office Policies    Phone calls/patient messages:            Please allow up to 24 hours for someone in the office to contact you about your call or message. Be mindful your provider may be out of the office or your message may require further review. We encourage you to use [employer] for your messages as this is a faster, more efficient way to communicate with our office                         Medication Refills:            Prescription medications require 48-72 business hours to process. We encourage you to use [employer] for your refills. For controlled medications: Please allow 72 business hours to process. Certain medications may require you to  a written prescription at our office. NO narcotic/controlled medications will be prescribed after 4pm Monday through Friday or on weekends              Form/Paperwork Completion:            Please note a $25 fee may incur for all paperwork for completed by our providers. We ask that you allow 7-10 business days. Pre-payment is due prior to picking up/faxing the completed form. You may also download your forms to [employer] to have your doctor print off.

## 2019-07-25 ENCOUNTER — PATIENT OUTREACH (OUTPATIENT)
Dept: INTERNAL MEDICINE CLINIC | Age: 64
End: 2019-07-25

## 2019-07-25 DIAGNOSIS — E11.9 TYPE 2 DIABETES MELLITUS WITHOUT COMPLICATION, WITHOUT LONG-TERM CURRENT USE OF INSULIN (HCC): ICD-10-CM

## 2019-07-25 DIAGNOSIS — R73.02 GLUCOSE INTOLERANCE (IMPAIRED GLUCOSE TOLERANCE): ICD-10-CM

## 2019-07-25 DIAGNOSIS — E11.8 CONTROLLED TYPE 2 DIABETES MELLITUS WITH COMPLICATION, WITHOUT LONG-TERM CURRENT USE OF INSULIN (HCC): Primary | ICD-10-CM

## 2019-07-25 LAB
T4 FREE SERPL-MCNC: 1.07 NG/DL (ref 0.82–1.77)
THYROGLOB AB SERPL-ACNC: <1 IU/ML (ref 0–0.9)
THYROPEROXIDASE AB SERPL-ACNC: 11 IU/ML (ref 0–34)
TSH SERPL DL<=0.005 MIU/L-ACNC: 0.33 UIU/ML (ref 0.45–4.5)

## 2019-07-25 NOTE — TELEPHONE ENCOUNTER
PCP: Kerline Bejarano MD    Last appt: 7/24/2019  Future Appointments   Date Time Provider Brandon Nowak   [facility]   11/20/2019  3:00 PM Kerline Bejarano MD [facility] 87       Requested Prescriptions     Pending Prescriptions Disp Refills    glucose blood VI test strips (ONETOUCH ULTRA BLUE TEST STRIP) strip 100 Strip 11     Sig: Use to check blood sugar once daily

## 2019-07-25 NOTE — PROGRESS NOTES
A1c was 6.1% on 7/19/19. Pt scheduled appt for diabetes self management education on 8/19/19 at 1:30. Pt will bring glucose log book. Message   Received: Yesterday   Message Contents   LUPE Caraballo, RN             Please call patient for DM teaching [phone]     Thanks! Assessment/Plan from Dr. Greg Ortega note on 7/24/19:  2.  Controlled type 2 diabetes mellitus with complication, without long-term current use of insulin ([facility])                 Discussed that her home readings are in diabetic range, would like to meet with Milad Church for diabetic education, continue metformin, a1c at goal, will have her check sugars in the evening a few times to see if these are better than fasted readings

## 2019-08-19 ENCOUNTER — OFFICE VISIT (OUTPATIENT)
Dept: INTERNAL MEDICINE CLINIC | Age: 64
End: 2019-08-19

## 2019-08-19 ENCOUNTER — PATIENT OUTREACH (OUTPATIENT)
Dept: INTERNAL MEDICINE CLINIC | Age: 64
End: 2019-08-19

## 2019-08-19 VITALS — BODY MASS INDEX: 39.58 KG/M2 | WEIGHT: 268 LBS

## 2019-08-19 DIAGNOSIS — E11.9 CONTROLLED TYPE 2 DIABETES MELLITUS WITHOUT COMPLICATION, WITHOUT LONG-TERM CURRENT USE OF INSULIN (HCC): Primary | ICD-10-CM

## 2019-08-19 NOTE — Clinical Note
apolonia-pt's glucose flow sheet is in my note; 3 week average is 155; she is calling me in two weeks with blood sugars; she is going to try a second metformin instead of adding the GLP-1 for now; pt will start a walking program, and follow the healthy plate method; she is really motivated and wants to lose 10-15 lbs before her next visit with you in November; thanks for referral

## 2019-08-19 NOTE — PROGRESS NOTES
Was asked by Dr. Dylan Merritt to see patient for diabetes education. Last A1c was 6.1% on 19. Previous A1Cs were:   Lab Results   Component Value Date/Time    Hemoglobin A1c 6.1 (H) 2019 01:07 PM    Hemoglobin A1c 6.4 (H) 2019 09:35 AM    Hemoglobin A1c 6.1 (H) 2018 01:33 PM     Previous diabetes education - none.      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    Diabetes History/Diabetes Family History - was told her blood sugars were elevated about 10 years ago; her brother  from diabetes; had many paternal and maternal aunts and uncles who were diabetic    Symptoms of high blood sugar- none    Motivation - wants to get control; when she was first diagnosed she got controlled by losing 20 lbs    AADE 7 Self-Care Behaviors- does not cook much; eats out a lot or buys pizza or lean cuisine; gave up ice cream and has not cooked spaghetti for months    1) Healthy Eating/Food Recall-   BK - 8-9 AM - 1/2 cup maple oatmeal w/whole banana, sometimes with link sausages; coffee w/Equal and powdered plain creamer  LN - 2-3:00 after work; picks up a 's salad at [employer]  or a [employer] from [employer]; or eats a lean cuisine when she gets home; Western [place] dressing; water or diet 7-UP  DN - [facility]-usually a steak and baked potato and side salad w/Yi dressing; water; or thin crust pizza or frozenTony's pizza  SN - tbsp peanut butter at work, sometimes on torie crackers; popcorn; Kim bar  ADAM - water, diet 7-up, 2 glasses of sweet wine some nights of the week before bed; advised pt to dilute with diet 7-up or sparkling water    2) Being Active- is not currently active, but will start walking the inside [address] or at home starting out 10-15 minutes daily    3) Self Monitoring Blood Glucose (SMBG)- see glucose log scanned to chart; checks fasting and before bed; three week average is 155; morning average is 148; evening average is 169; pt will start checking fasting blood sugar every morning and either a pre dinner or bedtime blood sugar. date AM bedtime   8/19/19 120    8/18/19 154    8/17/19 148    8/16/19 153    8/15/19 169    8/14/19  126   8/13/19 159    8/12/19  176   8/10/19 142    8/6/19 154    8/5/19 155 140   8/4/19 156    8/3/19  163   8/2/19 136         7/31/19 150 154   7/30/19 155 203   7/29/19  219   7/28/19 144    7/27/19 130 173       How to treat a low blood sugar -  n/a    4) Taking Medication- is taking metformin er 500 mg in the evening; will increase to one table twice daily or two with dinner; if gets loose stools will stop the second dose    5) Problem Solving- pt is ready and willing to manage her high blood sugars by making adjustment in her treatment plan    6) Reducing Risk-   Tobacco - does not smoke  HTN - takes medication  HLD - takes medication   Aspirin - takes 81 mg    Discussed possible complications of uncontrolled diabetes ie fatigue, dehydration, damage to vital organs. 7) Healthy Coping-   Support system - pt's  and son eat a lot of sweets and do not gain weight, which is frustrating for pt;  Pt will take advantage of the support and education provided today and of the support of her health care team.    Barriers identified - none    Health Maintenance Due:  Health Maintenance Due   Topic Date Due    Shingrix Vaccine Age 49> (1 of 2) 03/11/2004    Influenza Age 5 to Adult  08/01/2019     Resources provided:    -Diabetes Made Simple Video    -Living With Type 2 Diabetes Create Your Plate section    -Label Reading Basics for Diabetes    -Carb Counting and Meal Planning    -Low and No-Carb Snack Ideas for Diabetics    -A roadmap of diabetes: Where to watch for problems    DSMT- will discuss    Plan / Pt Goals - pt stated  -lose 5-10 lbs before next visit with Dr. Alanna Law by starting to walk 10-15 minutes every day (starting out); and following the healthy plate meal plan being mindful of what is a carb (fruit, dairy, grains, starchy vegetables) and portion size. As a rule, 1/2 cup of a carb food is a serving size. The guideline is up to 45 grams of carb per meal (3 meals per day) or 3 servings per meal.  A snack guideline is- 1 oz. protein serving and may add 1 carb serving (15 grams)  -dilute sweet wine with diet 7-up or sparkling water  -try adding the second metformin in the evening or one twice daily with meals; call if have loose stool  -will consider GLP-1 agonist if numbers do not come down  -check blood sugars every morning fasting and either before dinner or bedtime daily  -call Tatyana Pedroza at [phone] with blood sugars in two weeks and anytime with questions  -follow up with Dr. Kenny Pyle as scheduled    Future Appointments   Date Time Provider Brandon Nowak   8/22/2019  9:15 AM [facility]    9/16/2019  2:30 PM [facility]   11/20/2019  3:00 PM Phillip Moe MD [address]      Last Appointment My Department:  7/24/2019    Chart was routed to Dr. Kenny Pyle.     Jannette Foster, RN, BSN, CDE, CCM  (Phone) [phone]

## 2019-08-19 NOTE — PATIENT INSTRUCTIONS
Goals/plan:  -lose 5-10 lbs before next visit with Dr. Saleem Meléndez by starting to walk 10-15 minutes every day (starting out); and following the healthy plate meal plan being mindful of what is a carb (fruit, dairy, grains, starchy vegetables) and portion size. As a rule, 1/2 cup of a carb food is a serving size. The guideline is up to 45 grams of carb per meal (3 meals per day) or 3 servings per meal.  A snack guideline is- 1 oz. protein serving and may add 1 carb serving (15 grams)  -try adding the second metformin in the evening or one twice daily with meals  -check blood sugars every morning fasting and either before dinner or bedtime daily  -call Leyla Loera at [phone] with blood sugars in two weeks and anytime with questions  -follow up with Dr. Saleem Meléndez as scheduled      Office Policies     Phone calls/patient messages:            Please allow up to 24 hours for someone in the office to contact you about your call or message. Be mindful your provider may be out of the office or your message may require further review. We encourage you to use [employer] for your messages as this is a faster, more efficient way to communicate with our office                         Medication Refills:            Prescription medications require 48-72 business hours to process. We encourage you to use [employer] for your refills. For controlled medications: Please allow 72 business hours to process. Certain medications may require you to  a written prescription at our office. NO narcotic/controlled medications will be prescribed after 4pm Monday through Friday or on weekends              Form/Paperwork Completion:            Please note a $25 fee may incur for all paperwork for completed by our providers. We ask that you allow 7-10 business days. Pre-payment is due prior to picking up/faxing the completed form. You may also download your forms to [employer] to have your doctor print off.

## 2019-08-19 NOTE — PROGRESS NOTES
Was asked by Dr. Sandra Cadet to see patient for diabetes education. Last A1c was 6.1% on 19. Previous A1Cs were:   Lab Results   Component Value Date/Time    Hemoglobin A1c 6.1 (H) 2019 01:07 PM    Hemoglobin A1c 6.4 (H) 2019 09:35 AM    Hemoglobin A1c 6.1 (H) 2018 01:33 PM     Previous diabetes education - none.      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    Diabetes History/Diabetes Family History - was told her blood sugars were elevated about 10 years ago; her brother  from diabetes; had many paternal and maternal aunts and uncles who were diabetic    Symptoms of high blood sugar- none    Motivation - wants to get control; when she was first diagnosed she got controlled by losing 20 lbs    AADE 7 Self-Care Behaviors- does not cook much; eats out a lot or buys pizza or lean cuisine; gave up ice cream and has not cooked spaghetti for months    1) Healthy Eating/Food Recall-   BK - 8-9 AM - 1/2 cup maple oatmeal w/whole banana, sometimes with link sausages; coffee w/Equal and powdered plain creamer  LN - 2-3:00 after work; picks up a 's salad at [employer]  or a [employer] from [employer]; or eats a lean cuisine when she gets home; Western Diana dressing; water or diet 7-UP  DN - [facility]-usually a steak and baked potato and side salad w/Maltese dressing; water; or thin crust pizza or frozenTony's pizza  SN - tbsp peanut butter at work, sometimes on torie crackers; popcorn; Kim bar  ADAM - water, diet 7-up, 2 glasses of sweet wine some nights of the week before bed; advised pt to dilute with diet 7-up or sparkling water    2) Being Active- is not currently active, but will start walking the inside [address] or at home starting out 10-15 minutes daily    3) Self Monitoring Blood Glucose (SMBG)- see glucose log scanned to chart; checks fasting and before bed; three week average is 155; morning average is 148; evening average is 169; pt will start checking fasting blood sugar every morning and either a pre dinner or bedtime blood sugar. date AM bedtime   8/19/19 120    8/18/19 154    8/17/19 148    8/16/19 153    8/15/19 169    8/14/19  126   8/13/19 159    8/12/19  176   8/10/19 142    8/6/19 154    8/5/19 155 140   8/4/19 156    8/3/19  163   8/2/19 136         7/31/19 150 154   7/30/19 155 203   7/29/19  219   7/28/19 144    7/27/19 130 173       How to treat a low blood sugar -  n/a    4) Taking Medication- is taking metformin er 500 mg in the evening; will increase to one table twice daily or two with dinner; if gets loose stools will stop the second dose    5) Problem Solving- pt is ready and willing to manage her high blood sugars by making adjustment in her treatment plan    6) Reducing Risk-   Tobacco - does not smoke  HTN - takes medication  HLD - takes medication   Aspirin - takes 81 mg    Discussed possible complications of uncontrolled diabetes ie fatigue, dehydration, damage to vital organs. 7) Healthy Coping-   Support system - pt's  and son eat a lot of sweets and do not gain weight, which is frustrating for pt;  Pt will take advantage of the support and education provided today and of the support of her health care team.    Barriers identified - none    Health Maintenance Due:  Health Maintenance Due   Topic Date Due    Shingrix Vaccine Age 49> (1 of 2) 03/11/2004    Influenza Age 5 to Adult  08/01/2019     Resources provided:    -Diabetes Made Simple Video    -Living With Type 2 Diabetes Create Your Plate section    -Label Reading Basics for Diabetes    -Carb Counting and Meal Planning    -Low and No-Carb Snack Ideas for Diabetics    -A roadmap of diabetes: Where to watch for problems    DSMT- will discuss    Plan / Pt Goals - pt stated  -lose 5-10 lbs before next visit with Dr. Linda Garcia by starting to walk 10-15 minutes every day (starting out); and following the healthy plate meal plan being mindful of what is a carb (fruit, dairy, grains, starchy vegetables) and portion size. As a rule, 1/2 cup of a carb food is a serving size. The guideline is up to 45 grams of carb per meal (3 meals per day) or 3 servings per meal.  A snack guideline is- 1 oz. protein serving and may add 1 carb serving (15 grams)  -dilute sweet wine with diet 7-up or sparkling water  -try adding the second metformin in the evening or one twice daily with meals; call if have loose stool  -will consider GLP-1 agonist if numbers do not come down  -check blood sugars every morning fasting and either before dinner or bedtime daily  -call Chris Belcher at [phone] with blood sugars in two weeks and anytime with questions  -follow up with Dr. Tessie Barajas as scheduled    Future Appointments   Date Time Provider Brandon Nowak   8/22/2019  9:15 AM [facility]   9/16/2019  2:30 PM [facility] Ochoa [facility]   11/20/2019  3:00 PM Bonifacio Linton MD [address]      Last Appointment My Department:  7/24/2019    Chart was routed to Dr. Tessie Barajas.     Harpal Seals, RN, BSN, CDE, CCM  (Phone) [phone]

## 2019-08-20 ENCOUNTER — PATIENT OUTREACH (OUTPATIENT)
Dept: INTERNAL MEDICINE CLINIC | Age: 64
End: 2019-08-20

## 2019-08-20 NOTE — PROGRESS NOTES
Called pt to let her know that she left her \"Dixie\"   bag in my office yesterday. She will pick it up today. Pt's bag was put at the  with staff.

## 2019-08-22 ENCOUNTER — HOSPITAL ENCOUNTER (OUTPATIENT)
Dept: MAMMOGRAPHY | Age: 64
Discharge: HOME | End: 2019-08-22
Attending: INTERNAL MEDICINE
Payer: MEDICARE

## 2019-08-22 DIAGNOSIS — Z12.39 BREAST SCREENING: ICD-10-CM

## 2019-08-22 PROCEDURE — 77067 SCR MAMMO BI INCL CAD: CPT

## 2019-08-25 RX ORDER — PRAVASTATIN SODIUM 20 MG/1
TABLET ORAL
Qty: 90 TAB | Refills: 1 | Status: SHIPPED | OUTPATIENT
Start: 2019-08-25 | End: 2020-02-14

## 2019-08-26 RX ORDER — LISINOPRIL AND HYDROCHLOROTHIAZIDE 10; 12.5 MG/1; MG/1
TABLET ORAL
Qty: 90 TAB | Refills: 0 | Status: SHIPPED | OUTPATIENT
Start: 2019-08-26 | End: 2019-11-17 | Stop reason: SDUPTHER

## 2019-09-16 ENCOUNTER — HOSPITAL ENCOUNTER (OUTPATIENT)
Dept: NUTRITION | Age: 64
Discharge: HOME | End: 2019-09-16
Payer: MEDICARE

## 2019-09-16 PROCEDURE — 97803 MED NUTRITION INDIV SUBSEQ: CPT | Performed by: DIETITIAN, REGISTERED

## 2019-09-16 NOTE — PROGRESS NOTES
NUTRITION  FOLLOW-UP TREATMENT NOTE  Patient Name: Alexandrea Osman         Date: 2019  : 1955    YES Patient  Verified  Diagnosis: Diabetes Type 2   In time:   2:30pm         Out time:   3:00pm   Total Treatment Time (min):   30     SUBJECTIVE/ASSESSMENT    Changes in medication or medical history? Any new allergies, surgeries or procedures? YES   Increased to 1000 mg metformin er per day   Pt has returned for follow up. She has seen CDE at PCP office and had Metformin dose increased. Under high amounts of stress at work and expects to continue till January. Also under stress at home with moving. Finished today. Discussed how stress can affect blood sugars. She notes high blood sugars at home 120-175mg/dl mornings. Is able to see that they are lower in the mornings when she walks at night. Plans to walk more in new neighborhood. Is keeping food log. Did not bring. Feels she is doing better to reduce her intake of high carbohydrate foods that she does not feel she can easily control portions of. Provided support. Checked SMBG in office 3pm (last meal at 7am) = 116mg/dl. She is relieved to see this and plans to continue goals as previous. She expressed comprehension, high motivation, and compliance is expected.      Current Wt: 265.2 Previous Wt: 265.2 Wt Change: -     Achievement of Goals: Met all goals   - pasta portion: split serving into 2 meals OR try smaller dish to eat out of  -: limit carbohydrate amount based on plan  -return to food log daily   -exercise: return to 3 days per week  -continue wine on weekends only         Patient Education:  [x]  Review current plan with patient   []  Other:    Handouts/  Information Provided: []  Carbohydrates  []  Protein  []  Fiber  []  Serving Sizes  []  Fluids  []  General guidelines []  Diabetes  []  Cholesterol  []  Sodium  []  SBGM  []  Food Journals  []  Others:      New Patient Goals: -increase exercise with walking at night 2 miles 3-4 times per week  -continue keeping food log daily for accountability     PLAN    [x]  Continue on current plan []  Follow-up PRN   []  Discharge due to :    [x]  Next appt: 1-2 months     Dietitian: Patrica Stewart MS RD    Date: 9/16/2019 Time: 5:38 PM

## 2019-09-19 ENCOUNTER — PATIENT OUTREACH (OUTPATIENT)
Dept: INTERNAL MEDICINE CLINIC | Age: 64
End: 2019-09-19

## 2019-09-19 VITALS — WEIGHT: 264 LBS | BODY MASS INDEX: 38.99 KG/M2

## 2019-09-19 NOTE — PROGRESS NOTES
Pt called back and reported the following blood sugars:    Date  Fasting  Bedtime    9/18/19 156    9/17  153    9/15 152    9/14 140    9/11 175    9/10 172 151   9/9 152    9/6 144    9/5 160    9/4 139    9/3 150 135   9/1 163      Pt is taking metformin  mg, two with dinner with no side effects. Pt has noticed that when exercises and eats right she has a lower sugar. She just moved and has been under a lot of stress. Her and her  are going to get bicycles and ride together. She also has an exercise tape, and can walk in her new neighborhood. Pt stated she has lost 4 lbs so in on her way to accomplishing her goal of losing 5-10 lbs. Pt is seeing the nutritionist. At that appt on Monday, her fasting blood sugar was 116. Pt expressed gratitude for follow up phone call. Will follow next a1c. Goals      Patient verbalizes understanding of self -management goals of living with Diabetes. 9/19/19-dkw  -left message for pt to call back for any questions or needs before f/u appts with Lea Cowden, MS, RD and Dr. Teri Batres  -will follow    8/19/19-dkw  -lose 5-10 lbs before next visit with Dr. Teri Batres by starting to walk 10-15 minutes every day (starting out); and following the healthy plate meal plan being mindful of what is a carb (fruit, dairy, grains, starchy vegetables) and portion size. As a rule, 1/2 cup of a carb food is a serving size.  The guideline is up to 45 grams of carb per meal (3 meals per day) or 3 servings per meal.  A snack guideline is- 1 oz. protein serving and may add 1 carb serving (15 grams)  -dilute sweet wine with diet 7-up or sparkling water  -try adding the second metformin in the evening or one twice daily with meals; call if have loose stool  -will consider GLP-1 agonist if numbers do not come down  -check blood sugars every morning fasting and either before dinner or bedtime daily  -call Cookie at [phone] with blood sugars in two weeks and anytime with questions  -follow up with Dr. Jj Whittington as scheduled  -will follow

## 2019-09-19 NOTE — PROGRESS NOTES
Chart reviewed. Reached out to pt regarding follow up of prediabetes. Pt is currently being seen to nutritionist. See notes in chart. Left pt message to call back and left reminder of upcoming appts with Fabio Bryan MS, RD and Dr. Carl Yepez. Goals      Patient verbalizes understanding of self -management goals of living with Diabetes. 9/19/19-dkw  -left message for pt to call back for any questions or needs before f/u appts with Fabio Bryan MS, RD and Dr. Carl Yepez  -will follow    8/19/19-dkw  -lose 5-10 lbs before next visit with Dr. Carl Yepez by starting to walk 10-15 minutes every day (starting out); and following the healthy plate meal plan being mindful of what is a carb (fruit, dairy, grains, starchy vegetables) and portion size. As a rule, 1/2 cup of a carb food is a serving size.  The guideline is up to 45 grams of carb per meal (3 meals per day) or 3 servings per meal.  A snack guideline is- 1 oz. protein serving and may add 1 carb serving (15 grams)  -dilute sweet wine with diet 7-up or sparkling water  -try adding the second metformin in the evening or one twice daily with meals; call if have loose stool  -will consider GLP-1 agonist if numbers do not come down  -check blood sugars every morning fasting and either before dinner or bedtime daily  -call Cookie at [phone] with blood sugars in two weeks and anytime with questions  -follow up with Dr. Carl Yepez as scheduled  -will follow            Lab Results   Component Value Date/Time    Hemoglobin A1c 6.1 (H) 07/18/2019 01:07 PM    Hemoglobin A1c 6.4 (H) 04/18/2019 09:35 AM    Hemoglobin A1c 6.1 (H) 12/12/2018 01:33 PM       Future Appointments   Date Time Provider Brandon Nowak   10/28/2019  2:30 PM Fern Sanders   11/20/2019  3:00 PM MD [facility] [address]      Last Appointment My Department:  8/19/2019

## 2019-10-10 RX ORDER — METOPROLOL TARTRATE 50 MG/1
TABLET ORAL
Qty: 180 TAB | Refills: 1 | Status: SHIPPED | OUTPATIENT
Start: 2019-10-10 | End: 2020-04-08

## 2019-10-28 ENCOUNTER — APPOINTMENT (OUTPATIENT)
Dept: NUTRITION | Age: 64
End: 2019-10-28

## 2019-11-17 RX ORDER — LISINOPRIL AND HYDROCHLOROTHIAZIDE 10; 12.5 MG/1; MG/1
TABLET ORAL
Qty: 90 TAB | Refills: 0 | Status: SHIPPED | OUTPATIENT
Start: 2019-11-17 | End: 2020-02-11

## 2019-11-18 ENCOUNTER — HOSPITAL ENCOUNTER (OUTPATIENT)
Dept: LAB | Age: 64
Discharge: HOME | End: 2019-11-18
Payer: MEDICARE

## 2019-11-18 DIAGNOSIS — E11.8 CONTROLLED TYPE 2 DIABETES MELLITUS WITH COMPLICATION, WITHOUT LONG-TERM CURRENT USE OF INSULIN (HCC): ICD-10-CM

## 2019-11-18 PROCEDURE — 80048 BASIC METABOLIC PNL TOTAL CA: CPT

## 2019-11-18 PROCEDURE — 85027 COMPLETE CBC AUTOMATED: CPT

## 2019-11-18 PROCEDURE — 83036 HEMOGLOBIN GLYCOSYLATED A1C: CPT

## 2019-11-19 LAB
BUN SERPL-MCNC: 15 MG/DL (ref 8–27)
BUN/CREAT SERPL: 17 (ref 12–28)
CALCIUM SERPL-MCNC: 10.4 MG/DL (ref 8.7–10.3)
CHLORIDE SERPL-SCNC: 102 MMOL/L (ref 96–106)
CO2 SERPL-SCNC: 24 MMOL/L (ref 20–29)
CREAT SERPL-MCNC: 0.9 MG/DL (ref 0.57–1)
ERYTHROCYTE [DISTWIDTH] IN BLOOD BY AUTOMATED COUNT: 12.1 % (ref 12.3–15.4)
EST. AVERAGE GLUCOSE BLD GHB EST-MCNC: 131 MG/DL
GLUCOSE SERPL-MCNC: 93 MG/DL (ref 65–99)
HBA1C MFR BLD: 6.2 % (ref 4.8–5.6)
HCT VFR BLD AUTO: 41.9 % (ref 34–46.6)
HGB BLD-MCNC: 14.3 G/DL (ref 11.1–15.9)
MCH RBC QN AUTO: 30.3 PG (ref 26.6–33)
MCHC RBC AUTO-ENTMCNC: 34.1 G/DL (ref 31.5–35.7)
MCV RBC AUTO: 89 FL (ref 79–97)
PLATELET # BLD AUTO: 263 X10E3/UL (ref 150–450)
POTASSIUM SERPL-SCNC: 4.5 MMOL/L (ref 3.5–5.2)
RBC # BLD AUTO: 4.72 X10E6/UL (ref 3.77–5.28)
SODIUM SERPL-SCNC: 141 MMOL/L (ref 134–144)
WBC # BLD AUTO: 8.8 X10E3/UL (ref 3.4–10.8)

## 2019-11-20 ENCOUNTER — OFFICE VISIT (OUTPATIENT)
Dept: INTERNAL MEDICINE CLINIC | Age: 64
End: 2019-11-20

## 2019-11-20 VITALS
OXYGEN SATURATION: 95 % | DIASTOLIC BLOOD PRESSURE: 83 MMHG | WEIGHT: 268 LBS | HEIGHT: 69 IN | HEART RATE: 86 BPM | TEMPERATURE: 97.5 F | BODY MASS INDEX: 39.69 KG/M2 | RESPIRATION RATE: 16 BRPM | SYSTOLIC BLOOD PRESSURE: 140 MMHG

## 2019-11-20 DIAGNOSIS — I10 ESSENTIAL HYPERTENSION: ICD-10-CM

## 2019-11-20 DIAGNOSIS — E78.00 HYPERCHOLESTEROLEMIA: ICD-10-CM

## 2019-11-20 DIAGNOSIS — F32.9 REACTIVE DEPRESSION: ICD-10-CM

## 2019-11-20 DIAGNOSIS — E11.8 CONTROLLED TYPE 2 DIABETES MELLITUS WITH COMPLICATION, WITHOUT LONG-TERM CURRENT USE OF INSULIN (HCC): Primary | ICD-10-CM

## 2019-11-20 DIAGNOSIS — Z23 ENCOUNTER FOR IMMUNIZATION: ICD-10-CM

## 2019-11-20 DIAGNOSIS — E66.01 SEVERE OBESITY (BMI 35.0-39.9) WITH COMORBIDITY (HCC): ICD-10-CM

## 2019-11-20 LAB
ALBUMIN UR QL STRIP: 30 MG/L
CREATININE, URINE POC: 300 MG/DL
MICROALBUMIN/CREAT RATIO POC: <30 MG/G

## 2019-11-20 RX ORDER — METOPROLOL SUCCINATE 50 MG/1
TABLET, EXTENDED RELEASE ORAL
COMMUNITY
Start: 2012-03-21 | End: 2020-12-17

## 2019-11-20 NOTE — PROGRESS NOTES
HISTORY OF PRESENT ILLNESS  Alexandrea Santos is a 71 y.o. female. HPI   Last here 19  Pt is here to f/u on chronic conditions.      BP is 152/87-- repeat  BP at home is around 128/68  Continues on lisinopril-HCTZ 10-12.5mg daily and metoprolol 50mg BID  She did not take her lisin-hct today        She is diabetic    BS at home running 140s-150s in morning, 103 after walking  Continues on metformin 500mg  BID, 1 in the AM, 1 in the PM   Recall increased metformin caused diarrhea      Wt today is 268 lbs --stable x lov   Pt is currently walking to help maintain weight  Pt previously did [employer]   Discussed importance of w/l with diabetes   Discussed diet and w/l      Reviewed labs              Continues on pravachol 20mg daily for cholesterol       No longer on zoloft 50mg after a rash  Pt did not take lexapro, states that she is doing ok off medications  but w/ some anxiety still gets stressed out   Pt states she is doing ok without it   Pt wonders if stress increases her glucose  Advised her to give it a try if she is stressed, told it takes 6 weeks to make difference    Mammogram reviewed from , negative      ACP not on file. SDMs are her  (Fernando Evangelista) and son Susana Bustillos).   Provided information again today.     PREVENTIVE:    Colonoscopy: 16, Dr. Sarah Lema, repeat 10 years  Pap: Dr. Kayden Ruiz: , negative   AAA: no fmhx  DEXA: 18, nl  Tdap: 3/31/2014    Pneumovax: 2017  [id number]: 19  Zostavax: 2015  Shingrix: ordered 18, reminded, have not gotten yet  Flu shot: 19  Foot exam: 19   Microalbumin: ,  ordered  A1c:1/15 6.1,  6.4, 10/16 6.0,  6.5,  6.2,  5.6,  5.9,  6.0,  5.9,  6.1,  6.4,  6.1,  6.2  Eye exam: Dr. Mariano Osman, 19, reviewed notes - blurry vision, due in feb  Hep C screen: , negative  Lipids:  LDL 48  EK/18    Patient Active Problem List    Diagnosis Date Noted    Controlled type 2 diabetes mellitus with complication, without long-term current use of insulin (HCC)     Severe obesity (BMI 35.0-39. 9) with comorbidity ([address] 04/17/2018    Prediabetes 01/06/2016    Hypercholesterolemia     Hypertension     Depression      Current Outpatient Medications   Medication Sig Dispense Refill    lisinopril-hydroCHLOROthiazide (PRINZIDE, ZESTORETIC) 10-12.5 mg per tablet TAKE 1 TABLET BY MOUTH EVERY DAY 90 Tab 0    metoprolol tartrate (LOPRESSOR) 50 mg tablet TAKE 1 TAB BY MOUTH TWO (2) TIMES A DAY. 180 Tab 1    pravastatin (PRAVACHOL) 20 mg tablet TAKE 1 TABLET BY MOUTH EVERY DAY 90 Tab 1    glucose blood VI test strips (ONETOUCH ULTRA BLUE TEST STRIP) strip Use to check blood sugar once daily 100 Strip 11    metFORMIN ER (GLUCOPHAGE XR) 500 mg tablet Take 1 Tab by mouth two (2) times daily (with meals). 180 Tab 1    lancets (ONETOUCH ULTRASOFT LANCETS) misc Use to check blood sugar once daily 1 Package 11    metFORMIN ER (GLUCOPHAGE XR) 500 mg tablet TAKE 2 TABS BY MOUTH DAILY (WITH DINNER). 180 Tab 0    varicella-zoster recombinant, PF, (SHINGRIX, PF,) 50 mcg/0.5 mL susr injection 0.5mL by IntraMUSCular route once now and then repeat in 2-6 months 0.5 mL 1    aspirin delayed-release (ASPIR-81) 81 mg tablet Take 1 Tab by mouth daily.  90 Tab 3     Past Surgical History:   Procedure Laterality Date    COLONOSCOPY N/A 8/11/2016    COL[facility]COPY    HX BREAST BIOPSY Left Years ago    Benign surgical biopsy    US GUIDE ASP BREAST LT CYST W NDL Left Years ago    Benign      Lab Results   Component Value Date/Time    WBC 8.8 11/18/2019 01:14 PM    HGB 14.3 11/18/2019 01:14 PM    HCT 41.9 11/18/2019 01:14 PM    PLATELET 619 75/86/8143 01:14 PM    MCV 89 11/18/2019 01:14 PM     Lab Results   Component Value Date/Time    Cholesterol, total 128 04/18/2019 09:35 AM    HDL Cholesterol 38 (L) 04/18/2019 09:35 AM    LDL, calculated 48 04/18/2019 09:35 AM    Triglyceride 208 (H) 04/18/2019 09:35 AM    CHOL/HDL Ratio 4.3 08/16/2010 01:49 PM     Lab Results   Component Value Date/Time    GFR est non-AA 67 11/18/2019 01:14 PM    GFR est AA 78 11/18/2019 01:14 PM    Creatinine 0.90 11/18/2019 01:14 PM    BUN 15 11/18/2019 01:14 PM    Sodium 141 11/18/2019 01:14 PM    Potassium 4.5 11/18/2019 01:14 PM    Chloride 102 11/18/2019 01:14 PM    CO2 24 11/18/2019 01:14 PM        Review of Systems   Respiratory: Negative for shortness of breath. Cardiovascular: Negative for chest pain. Physical Exam  Constitutional:       General: She is not in acute distress. Appearance: She is well-developed. She is not diaphoretic. HENT:      Head: Normocephalic and atraumatic. Right Ear: Tympanic membrane, ear canal and external ear normal.      Left Ear: Tympanic membrane, ear canal and external ear normal.      Mouth/Throat:      Mouth: Mucous membranes are moist.      Pharynx: Oropharynx is clear. No oropharyngeal exudate or posterior oropharyngeal erythema. Eyes:      General: No scleral icterus. Right eye: No discharge. Left eye: No discharge. Conjunctiva/sclera: Conjunctivae normal.      Pupils: Pupils are equal, round, and reactive to light. Neck:      Musculoskeletal: Normal range of motion and neck supple. Vascular: No carotid bruit. Cardiovascular:      Rate and Rhythm: Normal rate and regular rhythm. Heart sounds: Normal heart sounds. No murmur. No friction rub. No gallop. Pulmonary:      Effort: Pulmonary effort is normal. No respiratory distress. Breath sounds: Normal breath sounds. No wheezing or rales. Chest:      Chest wall: No tenderness. Abdominal:      Palpations: Abdomen is soft. Musculoskeletal: Normal range of motion. General: No tenderness or deformity. Right lower leg: Edema (trace BL) present. Left lower leg: Edema present. Lymphadenopathy:      Cervical: No cervical adenopathy. Skin:     General: Skin is warm and dry. Coloration: Skin is not pale. Findings: No erythema or rash. Neurological:      Mental Status: She is alert and oriented to person, place, and time. Coordination: Coordination normal.   Psychiatric:         Behavior: Behavior normal.         ASSESSMENT and PLAN    ICD-10-CM ICD-9-CM    1. Controlled type 2 diabetes mellitus with complication, without long-term current use of insulin (HCC)                  Controlled on metformin 2 per day continue  E11.8 250.90 LIPID PANEL      METABOLIC PANEL, COMPREHENSIVE      HEMOGLOBIN A1C WITH EAG   2. Hypercholesterolemia            Controlled on pravachol check lipids prior to f/u  E78.00 272.0 LIPID PANEL      METABOLIC PANEL, COMPREHENSIVE      HEMOGLOBIN A1C WITH EAG   3. Essential hypertension                Normally Controlled on lisinopril hctz and metoprolol normally initially elevated repeat improved no change to dose --rtc for nv I10 401.9 LIPID PANEL      METABOLIC PANEL, COMPREHENSIVE      HEMOGLOBIN A1C WITH EAG   4. Reactive depression                  recurrent issue has not started meds has lexapro and is open to starting this --will start F32.9 300.4 LIPID PANEL      METABOLIC PANEL, COMPREHENSIVE      HEMOGLOBIN A1C WITH EAG   5. Severe obesity (BMI 35.0-39. 9) with comorbidity ([address]              Discussed diet wt loss portion control  E66.01 278.01 LIPID PANEL      METABOLIC PANEL, COMPREHENSIVE      HEMOGLOBIN A1C WITH EAG          Scribed by Anali Kline of [address] [place] [address] [address], as dictated by Dr. Blue Felipe. Current diagnosis and concerns discussed with pt at length. Pt understands risks and benefits or current treatment plan and medications, and accepts the treatment and medication with any possible risks. Pt asks appropriate questions, which were answered. Pt was instructed to call with any concerns or problems.       I have reviewed the note documented by the scribe. The services provided are my own.   The documentation is accurate

## 2019-11-20 NOTE — PROGRESS NOTES
After obtaining consent, and per verbal order from Dr. Aditi Villavicencio, patient received influenza vaccine given by Garo Sanchez in left  Deltoid. Influenza Vaccine 0.5 mL IM now. Patient was observed for 10 minutes post injection. Patient tolerated injection well. VIS given.

## 2019-12-02 ENCOUNTER — CLINICAL SUPPORT (OUTPATIENT)
Dept: INTERNAL MEDICINE CLINIC | Age: 64
End: 2019-12-02

## 2019-12-02 VITALS — SYSTOLIC BLOOD PRESSURE: 122 MMHG | DIASTOLIC BLOOD PRESSURE: 73 MMHG

## 2019-12-02 DIAGNOSIS — I10 ESSENTIAL HYPERTENSION: Primary | ICD-10-CM

## 2019-12-16 ENCOUNTER — PATIENT OUTREACH (OUTPATIENT)
Dept: INTERNAL MEDICINE CLINIC | Age: 64
End: 2019-12-16

## 2019-12-16 NOTE — PROGRESS NOTES
Patient has graduated from the Complex Case Management  program on 12/16/19 for diabetes. Patient's symptoms are stable at this time. Patient/family has the ability to self-manage. Care management goals have been completed at this time. No further nurse navigator follow up scheduled. Goals Addressed                 This Visit's Progress     COMPLETED: Patient verbalizes understanding of self -management goals of living with Diabetes. On track     12/16/19-dkw  -a1c stable at 6.2% on 11/19/19 at f/u appt w/  -complex case management episode closed at this time  -left message with contact info to call with any questions on concerns    9/19/19-dkw  -left message for pt to call back for any questions or needs before f/u appts with Jairo Sommers, MS, RD and Dr. Antoinette Smith  -will follow    8/19/19-dkw  -lose 5-10 lbs before next visit with Dr. Antoinette Smith by starting to walk 10-15 minutes every day (starting out); and following the healthy plate meal plan being mindful of what is a carb (fruit, dairy, grains, starchy vegetables) and portion size. As a rule, 1/2 cup of a carb food is a serving size. The guideline is up to 45 grams of carb per meal (3 meals per day) or 3 servings per meal.  A snack guideline is- 1 oz. protein serving and may add 1 carb serving (15 grams)  -dilute sweet wine with diet 7-up or sparkling water  -try adding the second metformin in the evening or one twice daily with meals; call if have loose stool  -will consider GLP-1 agonist if numbers do not come down  -check blood sugars every morning fasting and either before dinner or bedtime daily  -call Simona Ascencio at [phone] with blood sugars in two weeks and anytime with questions  -follow up with Dr. Antoinette Smith as scheduled  -will follow            Pt has nurse navigator's contact information for any further questions, concerns, or needs.   Patients upcoming visits:    Future Appointments   Date Time Provider Brandon Nowak   3/18/2020  3:00 PM Antoinette Smith, MD [facility]

## 2020-01-17 RX ORDER — METFORMIN HYDROCHLORIDE 500 MG/1
TABLET, EXTENDED RELEASE ORAL
Qty: 180 TAB | Refills: 1 | Status: SHIPPED | OUTPATIENT
Start: 2020-01-17 | End: 2020-07-19

## 2020-01-21 DIAGNOSIS — R73.02 GLUCOSE INTOLERANCE (IMPAIRED GLUCOSE TOLERANCE): ICD-10-CM

## 2020-01-21 RX ORDER — LANCETS
EACH MISCELLANEOUS
Qty: 1 PACKAGE | Refills: 11 | Status: SHIPPED | OUTPATIENT
Start: 2020-01-21 | End: 2020-01-21 | Stop reason: SDUPTHER

## 2020-01-21 RX ORDER — LANCETS
EACH MISCELLANEOUS
Qty: 1 PACKAGE | Refills: 11 | Status: SHIPPED | OUTPATIENT
Start: 2020-01-21 | End: 2020-03-18 | Stop reason: SDUPTHER

## 2020-01-21 NOTE — TELEPHONE ENCOUNTER
PCP: Kat Daigle MD    Last appt: 12/2/2019  Future Appointments   Date Time Provider Brandon Nowak   3/18/2020  3:00 PM Kat Daigle MD [facility]       Requested Prescriptions     Pending Prescriptions Disp Refills    lancets (ONETOUCH ULTRASOFT LANCETS) misc 1 Package 11     Sig: Use to check blood sugar once daily

## 2020-01-28 ENCOUNTER — PATIENT OUTREACH (OUTPATIENT)
Dept: INTERNAL MEDICINE CLINIC | Age: 65
End: 2020-01-28

## 2020-01-28 NOTE — PROGRESS NOTES
Pt expressed interest in group diabetes classes during case management session. Reached out to pt and left message that group classes will start February 13 for four consecutive Thursday mornings from 10:00 - noon. Asked pt to call this certified diabetes care and  ([address]) back at [phone] if she is still interested.

## 2020-01-28 NOTE — PROGRESS NOTES
Pt left message stating she is interested in taking the diabetes classes. Spoke to pt and verified first class of four with be Thursday February 13th from 10-noon.

## 2020-02-11 RX ORDER — LISINOPRIL AND HYDROCHLOROTHIAZIDE 10; 12.5 MG/1; MG/1
TABLET ORAL
Qty: 90 TAB | Refills: 0 | Status: SHIPPED | OUTPATIENT
Start: 2020-02-11 | End: 2020-05-06

## 2020-02-13 ENCOUNTER — OFFICE VISIT (OUTPATIENT)
Dept: INTERNAL MEDICINE CLINIC | Age: 65
End: 2020-02-13

## 2020-02-13 DIAGNOSIS — E11.8 CONTROLLED TYPE 2 DIABETES MELLITUS WITH COMPLICATION, WITHOUT LONG-TERM CURRENT USE OF INSULIN (HCC): Primary | ICD-10-CM

## 2020-02-13 NOTE — PROGRESS NOTES
Patient attended session 1 of 4 of DSMT (Diabetes Self Management Training) Program today. The purpose of the program is to equip patients with the knowledge and skill to live an abundant life with diabetes. Session 1 included What is diabetes? and How do I stay healthy? Session 2 is scheduled for 2/20/20 from 10:00-12:00 and will include What can I eat? and How can blood glucose testing help me?

## 2020-02-14 RX ORDER — PRAVASTATIN SODIUM 20 MG/1
TABLET ORAL
Qty: 90 TAB | Refills: 1 | Status: SHIPPED | OUTPATIENT
Start: 2020-02-14 | End: 2020-08-13

## 2020-02-20 ENCOUNTER — PATIENT OUTREACH (OUTPATIENT)
Dept: INTERNAL MEDICINE CLINIC | Age: 65
End: 2020-02-20

## 2020-02-20 ENCOUNTER — OFFICE VISIT (OUTPATIENT)
Dept: INTERNAL MEDICINE CLINIC | Age: 65
End: 2020-02-20

## 2020-02-20 DIAGNOSIS — E11.8 CONTROLLED TYPE 2 DIABETES MELLITUS WITH COMPLICATION, WITHOUT LONG-TERM CURRENT USE OF INSULIN (HCC): Primary | ICD-10-CM

## 2020-02-20 NOTE — PROGRESS NOTES
Patient attended session 2 of 4 of DSMT (Diabetes Self Management Training) Program today. The purpose of the program is to equip patients with the knowledge and skill to live an abundant life with diabetes. Session two included What can I eat? and How can blood glucose tesing help me? Session 3 is scheduled for 2/27/20 from 10:00-12:00 and will include How do my diabetes medications work? and How does physical activity affect my diabetes?

## 2020-02-27 ENCOUNTER — OFFICE VISIT (OUTPATIENT)
Dept: INTERNAL MEDICINE CLINIC | Age: 65
End: 2020-02-27

## 2020-02-27 ENCOUNTER — PATIENT OUTREACH (OUTPATIENT)
Dept: INTERNAL MEDICINE CLINIC | Age: 65
End: 2020-02-27

## 2020-02-27 DIAGNOSIS — E11.8 CONTROLLED TYPE 2 DIABETES MELLITUS WITH COMPLICATION, WITHOUT LONG-TERM CURRENT USE OF INSULIN (HCC): Primary | ICD-10-CM

## 2020-02-27 NOTE — PROGRESS NOTES
Pharmacy Progress Note - DSME     Ms. Alexandrea Quinones 71 y.o. attended today's DSME session on medications.      Topics addressed today:  - Medication classes for T1DM vs T2DM - MOA, side effects, and dosing consideration  - Injection techniques and considerations  - Planning for travels      Pt specific notes:  Metformin ER occasionally causes diarrhea - manages w/ pepto bismol PRN w/ sx relief    Current Outpatient Medications on File Prior to Visit   Medication Sig Dispense Refill    pravastatin (PRAVACHOL) 20 mg tablet TAKE 1 TABLET BY MOUTH EVERY DAY 90 Tab 1    lisinopril-hydroCHLOROthiazide (PRINZIDE, ZESTORETIC) 10-12.5 mg per tablet TAKE 1 TABLET BY MOUTH EVERY DAY 90 Tab 0    lancets (ONETOUCH ULTRASOFT LANCETS) misc Use to check blood sugar once daily 1 Package 11    metFORMIN ER (GLUCOPHAGE XR) 500 mg tablet TAKE 1 TABLET BY MOUTH TWICE A DAY WITH MEALS 180 Tab 1    metoprolol succinate (TOPROL-XL) 50 mg XL tablet metoprolol succinate ER 50 mg tablet,extended release 24 hr   Prescribed by non [facility] MD      metoprolol tartrate (LOPRESSOR) 50 mg tablet TAKE 1 TAB BY MOUTH TWO (2) TIMES A DAY. 180 Tab 1    glucose blood VI test strips (ONETOUCH ULTRA BLUE TEST STRIP) strip Use to check blood sugar once daily 100 Strip 11    aspirin delayed-release (ASPIR-81) 81 mg tablet Take 1 Tab by mouth daily. 90 Tab 3     No current facility-administered medications on file prior to visit.       Lab Results   Component Value Date/Time    Sodium 141 11/18/2019 01:14 PM    Potassium 4.5 11/18/2019 01:14 PM    Chloride 102 11/18/2019 01:14 PM    CO2 24 11/18/2019 01:14 PM    Anion gap 8 08/16/2010 01:49 PM    Glucose 93 11/18/2019 01:14 PM    BUN 15 11/18/2019 01:14 PM    Creatinine 0.90 11/18/2019 01:14 PM    BUN/Creatinine ratio 17 11/18/2019 01:14 PM    GFR est AA 78 11/18/2019 01:14 PM    GFR est non-AA 67 11/18/2019 01:14 PM    Calcium 10.4 (H) 11/18/2019 01:14 PM    Bilirubin, total 0.4 04/18/2019 09:35 AM AST (SGOT) 18 04/18/2019 09:35 AM    Alk.  phosphatase 72 04/18/2019 09:35 AM    Protein, total 6.9 04/18/2019 09:35 AM    Albumin 4.2 04/18/2019 09:35 AM    Globulin 3.7 08/16/2010 01:49 PM    A-G Ratio 1.6 04/18/2019 09:35 AM    ALT (SGPT) 22 04/18/2019 09:35 AM     Lab Results   Component Value Date/Time    Hemoglobin A1c 6.2 (H) 11/18/2019 01:14 PM    Hemoglobin A1c 6.1 (H) 07/18/2019 01:07 PM    Hemoglobin A1c 6.4 (H) 04/18/2019 09:35 AM       Thank you for the consult,  Chrissie Montiel, PharmD, BCACP, CDE

## 2020-02-27 NOTE — PROGRESS NOTES
Patient attended session 3 of 4 of DSMT (Diabetes Self Management Training) Program today. The purpose of the program is to equip patients with the knowledge and skill to live an abundant life with diabetes. Session 3 included How do my diabetes medications work? and How does physical activity affect my diabetes? Session 4 is scheduled for March 5th from 10:00-12:00 and will include How do I find support to tackle this condition? and How do I figure out what's influencing my blood sugars?

## 2020-03-05 ENCOUNTER — OFFICE VISIT (OUTPATIENT)
Dept: INTERNAL MEDICINE CLINIC | Age: 65
End: 2020-03-05

## 2020-03-05 ENCOUNTER — PATIENT OUTREACH (OUTPATIENT)
Dept: INTERNAL MEDICINE CLINIC | Age: 65
End: 2020-03-05

## 2020-03-05 DIAGNOSIS — E11.8 CONTROLLED TYPE 2 DIABETES MELLITUS WITH COMPLICATION, WITHOUT LONG-TERM CURRENT USE OF INSULIN (HCC): Primary | ICD-10-CM

## 2020-03-05 NOTE — PROGRESS NOTES
Patient attended 4th and final session of DSMT (Diabetes Self Management Training) Program today. The purpose of the program is to equip patients with the knowledge and skill to live an abundant life with diabetes. Session 4 included How do I find support to tackle this condition? and How do I figure out what's influencing my blood sugars? Diabetes Self-management Training Program  Summary   Name:   Armando You  :   1955  Referring provider: Dr Maritza Paula assessment   Confidence Index* 81.6%   A1c GOAL   Actual 7%  6.2% completed 19   Healthy Eating ? Stage of change:  preparation   Being Active ? Stage of change:  contemplation   Monitoring ? Stage of change: maintenance   Taking Medications   BG management     BP management     Lipid management     Clot prevention Current dosing-   Metformin  mg, 2 tabs with dinner  Lisinopril/hctz 10.5 mg daily  Metoprolol 50 mg daily  Pravastatin 20 mg daily  81 mg aspirin daily   Healthy Coping  ? Stage of change: action-likes to read and has cut her work hours   Reducing Risks   Vaccines       Examinations             Heart protection       Kidney protection     Flu completed 10/20/19  Pneumonia (PCV 13) completed 19; (PPSV 23) completed 17  Hepatitis B DUE   Dilated eye exam completed 19  Dental exam completed ; Dentist has retired and pt will schedule next appt with Dr. Karmen Saleem exam completed 19  /73 completed 19  LDL 48 completed 19  Urine microalbumin <30 completed 19    *Diabetes Skills, Confidence & Preparedness Index  Diabetes Self-management Program   Date Content* Educator    What is Diabetes?   Reducing Risks Sylvia Giordano RN, [address], [facility]    Healthy Eating  Monitoring Sylvia Giordano RN, [facility], [facility]    Taking Medications  Being Active Sylvia Giordano RN, [address] [facility]  Thu \"Elisa\" Claudia Cockayne, PharmD, BCACP, [facility], Clinical Pharmacist Specialist     Healthy Coping  Problem solving Pat Andujar, RN, [facility], CCM   **Content derived from the [employer] of Diabetes Educators Diabetes Education Curriculum: A Guide to Successful Self-Management (2nd edition)    Metric Post-program progress   Confidence Index* 89%   A1C GOAL   Actual 7%   Due 3/18/20   Healthy Eating  ? Stage of change: Action   Being Active ? Stage of change: Action   Monitoring  ? Stage of change: Maintenance   Taking Medications   BG management     BP management     Lipid management     Clot prevention   Same as pre - program assessment     Healthy Coping              Practice-based Diabetes Support:  Dane Tom (Certified Diabetes Care & )  Chan Ang (Pharmacist)   Dr Carli Waldron:    and son    Coping Strategy:  Jenny Cervantes and uses exercise tapes   Reducing Risks   Vaccines       Examinations         Heart protection       Kidney protection   See above; no updates   *Diabetes Skills, Confidence & Preparedness Index  Diabetes Support Team   Diabetes Team Name   Primary care provider [address] [place] [address] care provider CLEMENTINE   Dentist Dr Britta Jacobs   Certified [facility]   Pharmacist [facility] in [address] [place] [address] and son   Friends Work team at [employer] where she walks     Follow-up visit Diabetes Self-management visit: 3/18/20  SMART Goal(s):  Exercise  45 grams carbs per meal and 15 grams for snack  Check blood sugar more during the day  Take medication after supper each night  Make all doctors appts, dentist, eye exam, Dr Vivien Pimentel  Know that I can manage this, I am a strong person!

## 2020-03-05 NOTE — Clinical Note
Rebekah finished diabetes self management training classes today. She attended all four classes. Her last a1c was 6.2% in November. Her concern is disease progression, which we discussed in class. Her next appt with you is March 18th.

## 2020-03-05 NOTE — PROGRESS NOTES
Patient attended 4th and final session of DSMT (Diabetes Self Management Training) Program today. The purpose of the program is to equip patients with the knowledge and skill to live an abundant life with diabetes. Session 4 included How do I find support to tackle this condition? and How do I figure out what's influencing my blood sugars? Diabetes Self-management Training Program  Summary   Name:   Srinath Osei  :   1955  Referring provider: Dr Mimi Martinez assessment   Confidence Index* 81.6%   A1c GOAL   Actual 7%  6.2% completed 19   Healthy Eating ? Stage of change:  preparation   Being Active ? Stage of change:  contemplation   Monitoring ? Stage of change: maintenance   Taking Medications   BG management     BP management     Lipid management     Clot prevention Current dosing-   Metformin  mg, 2 tabs with dinner  Lisinopril/hctz 10/12.5 mg daily  Metoprolol 50 mg daily  Pravastatin 20 mg daily  81 mg aspirin daily   Healthy Coping  ? Stage of change: action-likes to read and has cut her work hours   Reducing Risks   Vaccines       Examinations             Heart protection       Kidney protection     Flu completed 10/20/19  Pneumonia (PCV 13) completed 19; (PPSV 23) completed 17  Hepatitis B DUE   Dilated eye exam completed 19  Dental exam completed ; Dentist has retired and pt will schedule next appt with Dr. Alba العلي exam completed 19  /73 completed 19  LDL 48 completed 19  Urine microalbumin <30 completed 19    *Diabetes Skills, Confidence & Preparedness Index  Diabetes Self-management Program   Date Content* Educator    What is Diabetes?   Reducing Risks Billy Cm RN, [address], [facility]    Healthy Eating  Monitoring Billy Cm RN, [facility], [facility]    Taking Medications  Being Active Billy Cm RN, [address], [facility]  Thu \"Elisa\" Hailee Penaloza, JoniD, BCACP, [facility], Clinical Pharmacist Specialist     Healthy Coping  Problem solving Madisyn Gallardo, RN, [facility], CCM   **Content derived from the [employer] of Diabetes Educators Diabetes Education Curriculum: A Guide to Successful Self-Management (2nd edition)    Metric Post-program progress   Confidence Index* 89%   A1C GOAL   Actual 7%   Due 3/18/20   Healthy Eating  ? Stage of change: Action   Being Active ? Stage of change: Action   Monitoring  ? Stage of change: Maintenance   Taking Medications   BG management     BP management     Lipid management     Clot prevention   Same as pre - program assessment     Healthy Coping              Practice-based Diabetes Support:  Maggy Ford (Certified Diabetes Care & )  Liang Vega (Pharmacist)   Dr Torsten Thakur:    and son    Coping Strategy:  Verlyn Llanes and uses exercise tapes   Reducing Risks   Vaccines       Examinations         Heart protection       Kidney protection   See above; no updates   *Diabetes Skills, Confidence & Preparedness Index  Diabetes Support Team   Diabetes Team Name   Primary care provider [address] [place] [address] care provider CLEMENTINE   Dentist Dr Nico Heredia   Certified [facility]   Pharmacist [facility] in [address] [place] [address] and son   Friends Work team at [employer] where she walks     Follow-up visit Diabetes Self-management visit: 3/18/20  SMART Goal(s):  Exercise  45 grams carbs per meal and 15 grams for snack  Check blood sugar more during the day  Take medication after supper each night  Make all doctors appts, dentist, eye exam, Dr Mane Godoy  Know that I can manage this, I am a strong person!

## 2020-03-17 ENCOUNTER — HOSPITAL ENCOUNTER (OUTPATIENT)
Dept: LAB | Age: 65
Discharge: HOME | End: 2020-03-17
Payer: MEDICARE

## 2020-03-17 DIAGNOSIS — E66.01 SEVERE OBESITY (BMI 35.0-39.9) WITH COMORBIDITY (HCC): ICD-10-CM

## 2020-03-17 DIAGNOSIS — I10 ESSENTIAL HYPERTENSION: ICD-10-CM

## 2020-03-17 DIAGNOSIS — E78.00 HYPERCHOLESTEROLEMIA: ICD-10-CM

## 2020-03-17 DIAGNOSIS — E11.8 CONTROLLED TYPE 2 DIABETES MELLITUS WITH COMPLICATION, WITHOUT LONG-TERM CURRENT USE OF INSULIN (HCC): ICD-10-CM

## 2020-03-17 DIAGNOSIS — F32.9 REACTIVE DEPRESSION: ICD-10-CM

## 2020-03-17 PROCEDURE — 83036 HEMOGLOBIN GLYCOSYLATED A1C: CPT

## 2020-03-17 PROCEDURE — 80053 COMPREHEN METABOLIC PANEL: CPT

## 2020-03-17 PROCEDURE — 80061 LIPID PANEL: CPT

## 2020-03-17 PROCEDURE — 36415 COLL VENOUS BLD VENIPUNCTURE: CPT

## 2020-03-18 ENCOUNTER — OFFICE VISIT (OUTPATIENT)
Dept: INTERNAL MEDICINE CLINIC | Age: 65
End: 2020-03-18

## 2020-03-18 VITALS
HEART RATE: 66 BPM | WEIGHT: 268.8 LBS | OXYGEN SATURATION: 94 % | RESPIRATION RATE: 16 BRPM | BODY MASS INDEX: 39.81 KG/M2 | HEIGHT: 69 IN | SYSTOLIC BLOOD PRESSURE: 126 MMHG | DIASTOLIC BLOOD PRESSURE: 72 MMHG | TEMPERATURE: 98.1 F

## 2020-03-18 DIAGNOSIS — F32.9 REACTIVE DEPRESSION: Primary | ICD-10-CM

## 2020-03-18 DIAGNOSIS — E78.00 HYPERCHOLESTEROLEMIA: ICD-10-CM

## 2020-03-18 DIAGNOSIS — R73.02 GLUCOSE INTOLERANCE (IMPAIRED GLUCOSE TOLERANCE): ICD-10-CM

## 2020-03-18 DIAGNOSIS — E11.8 CONTROLLED TYPE 2 DIABETES MELLITUS WITH COMPLICATION, WITHOUT LONG-TERM CURRENT USE OF INSULIN (HCC): ICD-10-CM

## 2020-03-18 DIAGNOSIS — I10 ESSENTIAL HYPERTENSION: ICD-10-CM

## 2020-03-18 DIAGNOSIS — E11.9 TYPE 2 DIABETES MELLITUS WITHOUT COMPLICATION, WITHOUT LONG-TERM CURRENT USE OF INSULIN (HCC): ICD-10-CM

## 2020-03-18 LAB
ALBUMIN SERPL-MCNC: 4.3 G/DL (ref 3.8–4.8)
ALBUMIN/GLOB SERPL: 1.5 {RATIO} (ref 1.2–2.2)
ALP SERPL-CCNC: 83 IU/L (ref 39–117)
ALT SERPL-CCNC: 24 IU/L (ref 0–32)
AST SERPL-CCNC: 17 IU/L (ref 0–40)
BILIRUB SERPL-MCNC: 0.4 MG/DL (ref 0–1.2)
BUN SERPL-MCNC: 12 MG/DL (ref 8–27)
BUN/CREAT SERPL: 16 (ref 12–28)
CALCIUM SERPL-MCNC: 10.3 MG/DL (ref 8.7–10.3)
CHLORIDE SERPL-SCNC: 98 MMOL/L (ref 96–106)
CHOLEST SERPL-MCNC: 132 MG/DL (ref 100–199)
CO2 SERPL-SCNC: 25 MMOL/L (ref 20–29)
CREAT SERPL-MCNC: 0.75 MG/DL (ref 0.57–1)
EST. AVERAGE GLUCOSE BLD GHB EST-MCNC: 146 MG/DL
GLOBULIN SER CALC-MCNC: 2.8 G/DL (ref 1.5–4.5)
GLUCOSE SERPL-MCNC: 183 MG/DL (ref 65–99)
HBA1C MFR BLD: 6.7 % (ref 4.8–5.6)
HDLC SERPL-MCNC: 35 MG/DL
LDLC SERPL CALC-MCNC: 46 MG/DL (ref 0–99)
POTASSIUM SERPL-SCNC: 5 MMOL/L (ref 3.5–5.2)
PROT SERPL-MCNC: 7.1 G/DL (ref 6–8.5)
SODIUM SERPL-SCNC: 138 MMOL/L (ref 134–144)
TRIGL SERPL-MCNC: 253 MG/DL (ref 0–149)
VLDLC SERPL CALC-MCNC: 51 MG/DL (ref 5–40)

## 2020-03-18 RX ORDER — SEMAGLUTIDE 1.34 MG/ML
0.5 INJECTION, SOLUTION SUBCUTANEOUS
Qty: 1 BOX | Refills: 0 | Status: SHIPPED | OUTPATIENT
Start: 2020-03-18 | End: 2020-06-30

## 2020-03-18 RX ORDER — LANCETS
EACH MISCELLANEOUS
Qty: 1 PACKAGE | Refills: 11 | Status: SHIPPED | OUTPATIENT
Start: 2020-03-18 | End: 2021-02-24 | Stop reason: SDUPTHER

## 2020-03-18 NOTE — PROGRESS NOTES
HISTORY OF PRESENT ILLNESS  Alexandrea Castillo is a 76 y.o. female. HPI   Last here 19  Pt is here to f/u on chronic conditions.      BP is controlled   BP at home is around 120/80s at home   Continues on lisinopril-HCTZ 10-12.5mg daily and metoprolol 50mg BID  She did not take her lisin-hct today         She is diabetic    BS at home running 137 last night, 160 this AM, 140-150 in the AM   Continues on metformin 500mg  BID, 1 in the AM, 1 in the PM   Will be starting ozempic, provided samples today   Recall increased metformin caused diarrhea  States she still gets some bouts of diarrhea from metformin       Wt today is 268 lbs --stable x lov   Discussed glp1 agonist   Discussed if this is affordable would help with wt loss and BS control   Discussed this is not insulin   Pt previously did [employer]   Discussed importance of w/l with diabetes   Discussed diet and w/l      Reviewed labs 3/20            Continues on pravachol 20mg daily for cholesterol       No longer on zoloft 50mg after a rash  Lov, pt tried to take lexapro but did not like how this made her feel   States this kept her up at night   States she is doing ok without meds        ACP not on file. SDMs are her  (Fernando Evangelista) and son Barbara Oconnell).   Provided information again today.     PREVENTIVE:    Colonoscopy: 15, Dr. Jacob Villagomez, repeat 10 years  Pap: Dr. Telles Cristiano: , negative   AAA: no fmhx  DEXA: 18, nl  Tdap: 3/31/2014    Pneumovax: 2017  Sqpjqoa29: 19  Zostavax: 2015  Shingrix: ordered 18, reminded, have not gotten yet  Flu shot: 19  Foot exam:20   Microalbumin:    A1c:  6.2,  5.6,  5.9,  6.0,  5.9,  6.1,  6.4,  6.1,  6.2 3/20 6.7   Eye exam: Dr. Elvira Valles 20, will get notes   Hep C screen: , negative  Lipids: 3/20  LDL 46  EK/18    Patient Active Problem List    Diagnosis Date Noted    Controlled type 2 diabetes mellitus with complication, without long-term current use of insulin (HCC)     Severe obesity (BMI 35.0-39. 9) with comorbidity ([facility] [address] 04/17/2018    Prediabetes 01/06/2016    Hypercholesterolemia     Hypertension     Depression      Current Outpatient Medications   Medication Sig Dispense Refill    pravastatin (PRAVACHOL) 20 mg tablet TAKE 1 TABLET BY MOUTH EVERY DAY 90 Tab 1    lisinopril-hydroCHLOROthiazide (PRINZIDE, ZESTORETIC) 10-12.5 mg per tablet TAKE 1 TABLET BY MOUTH EVERY DAY 90 Tab 0    lancets (ONETOUCH ULTRASOFT LANCETS) misc Use to check blood sugar once daily 1 Package 11    metFORMIN ER (GLUCOPHAGE XR) 500 mg tablet TAKE 1 TABLET BY MOUTH TWICE A DAY WITH MEALS 180 Tab 1    metoprolol succinate (TOPROL-XL) 50 mg XL tablet metoprolol succinate ER 50 mg tablet,extended release 24 hr   Prescribed by non [facility] MD      metoprolol tartrate (LOPRESSOR) 50 mg tablet TAKE 1 TAB BY MOUTH TWO (2) TIMES A DAY. 180 Tab 1    glucose blood VI test strips (ONETOUCH ULTRA BLUE TEST STRIP) strip Use to check blood sugar once daily 100 Strip 11    aspirin delayed-release (ASPIR-81) 81 mg tablet Take 1 Tab by mouth daily.  80 Tab 3     Past Surgical History:   Procedure Laterality Date    COLONOSCOPY N/A 8/11/2016    COLONOSCOPY performed by [facility]y Pratt [facility] at [facility] ENDOSCOPY    HX BREAST BIOPSY Left Years ago    Benign surgical biopsy    US GUIDE ASP BREAST LT CYST W NDL Left Years ago    Benign      Lab Results   Component Value Date/Time    WBC 8.8 11/18/2019 01:14 PM    HGB 14.3 11/18/2019 01:14 PM    HCT 41.9 11/18/2019 01:14 PM    PLATELET 012 88/58/2349 01:14 PM    MCV 89 11/18/2019 01:14 PM     Lab Results   Component Value Date/Time    Cholesterol, total 132 03/17/2020 11:08 AM    HDL Cholesterol 35 (L) 03/17/2020 11:08 AM    LDL, calculated 46 03/17/2020 11:08 AM    Triglyceride 253 (H) 03/17/2020 11:08 AM    CHOL/HDL Ratio 4.3 08/16/2010 01:49 PM     Lab Results   Component Value Date/Time GFR est non-AA 83 03/17/2020 11:08 AM    GFR est AA 96 03/17/2020 11:08 AM    Creatinine 0.75 03/17/2020 11:08 AM    BUN 12 03/17/2020 11:08 AM    Sodium 138 03/17/2020 11:08 AM    Potassium 5.0 03/17/2020 11:08 AM    Chloride 98 03/17/2020 11:08 AM    CO2 25 03/17/2020 11:08 AM        Review of Systems   Respiratory: Negative for shortness of breath. Cardiovascular: Negative for chest pain. Physical Exam  Constitutional:       General: She is not in acute distress. Appearance: She is well-developed. She is not diaphoretic. HENT:      Head: Normocephalic and atraumatic. Eyes:      General:         Right eye: No discharge. Left eye: No discharge. Conjunctiva/sclera: Conjunctivae normal.   Neck:      Musculoskeletal: Normal range of motion and neck supple. Cardiovascular:      Rate and Rhythm: Normal rate and regular rhythm. Heart sounds: Normal heart sounds. No murmur. No friction rub. No gallop. Pulmonary:      Effort: Pulmonary effort is normal. No respiratory distress. Breath sounds: Normal breath sounds. No wheezing or rales. Chest:      Chest wall: No tenderness. Musculoskeletal: Normal range of motion. General: No tenderness or deformity. Lymphadenopathy:      Cervical: No cervical adenopathy. Skin:     General: Skin is warm and dry. Coloration: Skin is not pale. Findings: No erythema or rash. Neurological:      Mental Status: She is alert and oriented to person, place, and time.       Coordination: Coordination normal.      Comments: Monofilament nl BLE, good peripheral pulses, no ulcers      Psychiatric:         Mood and Affect: Mood normal.         Behavior: Behavior normal.         ASSESSMENT and PLAN    ICD-10-CM ICD-9-CM    1. Reactive depression                        Doing ok without meds had SE with zoloft and lexapro will remain off meds at this time  G71.3 334.7 METABOLIC PANEL, COMPREHENSIVE      HEMOGLOBIN A1C WITH EAG      TSH 3RD GENERATION   2. Controlled type 2 diabetes mellitus with complication, without long-term current use of insulin (HCC)              Worsening control home readings elevated a1c up on metformin 2 per day cannot tolerate higher doses due to GI SE discussed GLP1 agonist will start ozempic 0.25 mg for 4 wks increased to 0.5 mg at that time she will contact us if not covered by insurance  E11.8 250.90 HM DIABETES FOOT EXAM      METABOLIC PANEL, COMPREHENSIVE      HEMOGLOBIN A1C WITH EAG      TSH 3RD GENERATION   3. Essential hypertension                      Controlled on lisinopril hctz and toprol  E00 373.9 METABOLIC PANEL, COMPREHENSIVE      HEMOGLOBIN A1C WITH EAG      TSH 3RD GENERATION   4. Hypercholesterolemia                    Controlled on pravachol  B35.58 473.8 METABOLIC PANEL, COMPREHENSIVE      HEMOGLOBIN A1C WITH EAG      TSH 3RD GENERATION            Scribed by Licha Pal of [address] [place] [address] [address], as dictated by Dr. Jared Hurley. Current diagnosis and concerns discussed with pt at length. Pt understands risks and benefits or current treatment plan and medications, and accepts the treatment and medication with any possible risks. Pt asks appropriate questions, which were answered. Pt was instructed to call with any concerns or problems. I have reviewed the note documented by the scribe. The services provided are my own.   The documentation is accurate

## 2020-03-18 NOTE — PATIENT INSTRUCTIONS
CLASS IS GLP 1 AGONIST ([facility], BYDUREON) START OZEMPIC 0.25MG WEEKLY AFTER 4 WEEKS GO UP TO 0.5MG WEEKLY

## 2020-03-28 RX ORDER — DULAGLUTIDE 0.75 MG/.5ML
0.75 INJECTION, SOLUTION SUBCUTANEOUS
Qty: 4 SYRINGE | Refills: 1 | Status: SHIPPED | OUTPATIENT
Start: 2020-03-28 | End: 2020-06-30

## 2020-05-06 RX ORDER — LISINOPRIL AND HYDROCHLOROTHIAZIDE 10; 12.5 MG/1; MG/1
TABLET ORAL
Qty: 90 TAB | Refills: 0 | Status: SHIPPED | OUTPATIENT
Start: 2020-05-06 | End: 2020-08-10

## 2020-06-22 ENCOUNTER — TELEPHONE (OUTPATIENT)
Dept: INTERNAL MEDICINE CLINIC | Age: 65
End: 2020-06-22

## 2020-06-22 NOTE — TELEPHONE ENCOUNTER
Alexey Villagran MD  You 12 minutes ago (3:22 PM)     Haley Tuesday June 30th at 115 for inperson    Routing comment      Appointment scheduled for 6/30 @ 1:15 with Dr. Srini Khan. Pt verbalized understanding of information discussed w/ no further questions at this time.

## 2020-06-22 NOTE — TELEPHONE ENCOUNTER
Patient states she's cancelling her Appt on 6/24/20 & refuses to do a Virtual Visit as patient states \"I Want to see my Doctor in Person and will re-schedule when I can come see her. Please call if patient can be scheduled in office. PSR advised we're not scheduling in office as of yet.

## 2020-06-26 NOTE — PROGRESS NOTES
HISTORY OF PRESENT ILLNESS  Alexandrea Peña is a 76 y.o. female. HPI     Last here 3/18/20  Pt is here to f/u on chronic conditions.      BP today is 126/80  BP at home have been 120s/80s  Continues on lisinopril-HCTZ 10-12.5mg daily and metoprolol 50mg BID  She did not take her lisin-hct today      She is diabetic    BS at home [id number] 825 this am, 150 in middle of day, 120 while excercising, 105  Continues on metformin 500mg  BID, 1 in the AM, 1 in the PM   Will go up to QID--this caused diarrhea in the past but she think she will able to tolerate it and would like to give this another try  Lov, started on ozempic  Ozempic was too expensive  Discussed Januvia as well but we are concerned that cost will also be prohibitive  Recall increased metformin caused diarrhea  States she still gets some bouts of diarrhea from metformin     Discussed nutritionist      Wt today is 268 lbs --stable x lov   Discussed WW   Unfortunately she has not been able to make improvement with her weight she reports dietary indiscretion she is open to seeing a nutritionist  Discussed diet and w/l       Pt has labs to be completed       Continues on pravachol 20mg daily for cholesterol       No longer on zoloft 50mg after a rash  States she is doing ok without meds  she denies any problems with depression or anxiety today she has been doing quite well    Denies falls  She occasionally drinks alcohol  Denies hearing loss  Pt lives with     Pt is functionally independent       No memory concerns   Knows the month, date, year, location   Can recall 1/3 objects, 3/3 second time        ACP not on file. SDMs are her  (Fernando Camp) and son Daren Vega).   Provided information again today.     PREVENTIVE:    Colonoscopy: 8/11/16, Dr. Keely Richey, repeat 10 years  Pap: Dr. Leatha Eubanks, negative , ordered  AAA: no fmhx  DEXA: 7/25/18, ordered  Tdap: 3/31/2014    Pneumovax: 2017  Rbhpemo91: 19  Zostavax: 2015  Shingrix: ordered 18, reminded, have not gotten yet  Flu shot: 19  Foot exam:20   Microalbumin:    A1c:  6.2,  5.6,  5.9,  6.0,  5.9,  6.1,  6.4,  6.1,  6.2 3/20 6.7  6.9  Eye exam: Dr. Natalya Billings 20, will get notes   Hep C screen: , negative  Lipids: 3/20  LDL 46  EK/18    Patient Active Problem List    Diagnosis Date Noted    Controlled type 2 diabetes mellitus with complication, without long-term current use of insulin (HCC)     Severe obesity (BMI 35.0-39. 9) with comorbidity ([facility] [address] 2018    Prediabetes 2016    Hypercholesterolemia     Hypertension     Depression      Current Outpatient Medications   Medication Sig Dispense Refill    varicella-zoster recombinant, PF, (Shingrix, PF,) 50 mcg/0.5 mL susr injection 0.5mL by IntraMUSCular route once now and then repeat in 2-6 months 0.5 mL 1    lisinopril-hydroCHLOROthiazide (PRINZIDE, ZESTORETIC) 10-12.5 mg per tablet TAKE 1 TABLET BY MOUTH EVERY DAY 90 Tab 0    metoprolol tartrate (LOPRESSOR) 50 mg tablet TAKE 1 TAB BY MOUTH TWO (2) TIMES A DAY. 180 Tab 0    lancets (OneTouch UltraSoft Lancets) misc Use to check blood sugar once daily. DX E11.9 1 Package 11    glucose blood VI test strips (OneTouch Ultra Blue Test Strip) strip Use to check blood sugar once daily. DX E11.9 100 Strip 11    pravastatin (PRAVACHOL) 20 mg tablet TAKE 1 TABLET BY MOUTH EVERY DAY 90 Tab 1    metFORMIN ER (GLUCOPHAGE XR) 500 mg tablet TAKE 1 TABLET BY MOUTH TWICE A DAY WITH MEALS 180 Tab 1    aspirin delayed-release (ASPIR-81) 81 mg tablet Take 1 Tab by mouth daily.  90 Tab 3    metoprolol succinate (TOPROL-XL) 50 mg XL tablet metoprolol succinate ER 50 mg tablet,extended release 24 hr   Prescribed by non [facility] MD       Past Surgical History:   Procedure Laterality Date    COLONOSCOPY N/A 2016    COLONOSCOPY performed by Erik Isaac Kaushik Pedroza MD at [facility] ENDOSCOPY    HX BREAST BIOPSY Left Years ago    Benign surgical biopsy    US GUIDE ASP BREAST LT CYST W NDL Left Years ago    Benign      Lab Results   Component Value Date/Time    WBC 8.8 11/18/2019 01:14 PM    HGB 14.3 11/18/2019 01:14 PM    HCT 41.9 11/18/2019 01:14 PM    PLATELET 413 60/24/1354 01:14 PM    MCV 89 11/18/2019 01:14 PM     Lab Results   Component Value Date/Time    Cholesterol, total 132 03/17/2020 11:08 AM    HDL Cholesterol 35 (L) 03/17/2020 11:08 AM    LDL, calculated 46 03/17/2020 11:08 AM    Triglyceride 253 (H) 03/17/2020 11:08 AM    CHOL/HDL Ratio 4.3 08/16/2010 01:49 PM     Lab Results   Component Value Date/Time    GFR est non-AA 83 03/17/2020 11:08 AM    GFR est AA 96 03/17/2020 11:08 AM    Creatinine 0.75 03/17/2020 11:08 AM    BUN 12 03/17/2020 11:08 AM    Sodium 138 03/17/2020 11:08 AM    Potassium 5.0 03/17/2020 11:08 AM    Chloride 98 03/17/2020 11:08 AM    CO2 25 03/17/2020 11:08 AM        Review of Systems   Respiratory: Negative for shortness of breath. Cardiovascular: Negative for chest pain. Physical Exam  Constitutional:       General: She is not in acute distress. Appearance: Normal appearance. She is well-developed. She is not toxic-appearing or diaphoretic. HENT:      Head: Normocephalic and atraumatic. Eyes:      General:         Right eye: No discharge. Left eye: No discharge. Conjunctiva/sclera: Conjunctivae normal.      Pupils: Pupils are equal, round, and reactive to light. Neck:      Musculoskeletal: Normal range of motion and neck supple. Cardiovascular:      Rate and Rhythm: Normal rate and regular rhythm. Pulses: Normal pulses. Heart sounds: Normal heart sounds. No murmur. No friction rub. No gallop. Pulmonary:      Effort: Pulmonary effort is normal. No respiratory distress. Breath sounds: Normal breath sounds. No wheezing or rales. Chest:      Chest wall: No tenderness.    Musculoskeletal: Normal range of motion. General: Swelling (small, stable in legs) present. No tenderness or deformity. Lymphadenopathy:      Cervical: No cervical adenopathy. Skin:     General: Skin is warm and dry. Coloration: Skin is not pale. Findings: No erythema or rash. Neurological:      Mental Status: She is alert and oriented to person, place, and time. Mental status is at baseline. Coordination: Coordination normal.      Gait: Gait normal.   Psychiatric:         Mood and Affect: Mood normal.         Behavior: Behavior normal.         ASSESSMENT and PLAN    ICD-10-CM ICD-9-CM    1. Hypercholesterolemia      Controlled on Pravachol     J86.69 298.3 METABOLIC PANEL, COMPREHENSIVE      CBC W/O DIFF      HEMOGLOBIN A1C WITH EAG      TSH 3RD GENERATION      MICROALBUMIN, UR, 24 HR   2. Controlled type 2 diabetes mellitus with complication, without long-term current use of insulin ([facility] [address]    Patient has been having worsening control of her diabetes her fasting readings are above goal her A1c is climbed that was still showing adequate control of her diabetes    She is currently taking metformin 3 tablets/day    GLP-1 agonist was too expensive    Discussed Januvia also but this as well as expensive    She is willing to increase metformin to 4 tablets/day she had some GI upset in the past but she thinks she can tolerate it    If not improving will need to consider either a sulfonylurea or discussed cost of the alternative options again K99.3 537.18 METABOLIC PANEL, COMPREHENSIVE      CBC W/O DIFF      HEMOGLOBIN A1C WITH EAG      TSH 3RD GENERATION      MICROALBUMIN, UR, 24 HR      REFERRAL TO NUTRITION   3. Severe obesity (BMI 35.0-39. 9) with comorbidity ([facility] [address]    Patient has not improved her weight needs to work more aggressively on weight loss if she can get her weight down her diabetes will improve discussed weight watchers again she would like to see nutritionist placed referral E66.01 278.01 METABOLIC PANEL, COMPREHENSIVE      CBC W/O DIFF      HEMOGLOBIN A1C WITH EAG      TSH 3RD GENERATION      MICROALBUMIN, UR, 24 HR   4. Medicare annual wellness visit, subsequent Z00.00 V70.0    5. Encounter for screening mammogram for malignant neoplasm of breast Z12.31 V76.12 [facility] MAMMO BI SCREENING INCL CAD   6. Postmenopausal state    DEXA ordered Z78.0 V49.81 DEXA BONE DENSITY STUDY AXIAL        Depression screen reviewed and negative     Scribed by Suzan Cisneros of [address] [place] [address] [address], as dictated by Dr. Armando Crenshaw. Current diagnosis and concerns discussed with pt at length. Pt understands risks and benefits or current treatment plan and medications, and accepts the treatment and medication with any possible risks. Pt asks appropriate questions, which were answered. Pt was instructed to call with any concerns or problems. I have reviewed the note documented by the scribe. The services provided are my own.   The documentation is accurate

## 2020-06-30 ENCOUNTER — OFFICE VISIT (OUTPATIENT)
Dept: INTERNAL MEDICINE CLINIC | Age: 65
End: 2020-06-30

## 2020-06-30 VITALS
TEMPERATURE: 97.9 F | OXYGEN SATURATION: 97 % | BODY MASS INDEX: 39.69 KG/M2 | DIASTOLIC BLOOD PRESSURE: 80 MMHG | SYSTOLIC BLOOD PRESSURE: 126 MMHG | HEIGHT: 69 IN | HEART RATE: 71 BPM | RESPIRATION RATE: 18 BRPM | WEIGHT: 268 LBS

## 2020-06-30 DIAGNOSIS — E78.00 HYPERCHOLESTEROLEMIA: Primary | ICD-10-CM

## 2020-06-30 DIAGNOSIS — Z12.31 ENCOUNTER FOR SCREENING MAMMOGRAM FOR MALIGNANT NEOPLASM OF BREAST: ICD-10-CM

## 2020-06-30 DIAGNOSIS — Z00.00 MEDICARE ANNUAL WELLNESS VISIT, SUBSEQUENT: ICD-10-CM

## 2020-06-30 DIAGNOSIS — E66.01 SEVERE OBESITY (BMI 35.0-39.9) WITH COMORBIDITY (HCC): ICD-10-CM

## 2020-06-30 DIAGNOSIS — E11.8 CONTROLLED TYPE 2 DIABETES MELLITUS WITH COMPLICATION, WITHOUT LONG-TERM CURRENT USE OF INSULIN (HCC): ICD-10-CM

## 2020-06-30 DIAGNOSIS — Z78.0 POSTMENOPAUSAL STATE: ICD-10-CM

## 2020-06-30 RX ORDER — ZOSTER VACCINE RECOMBINANT, ADJUVANTED 50 MCG/0.5
KIT INTRAMUSCULAR
Qty: 0.5 ML | Refills: 1 | Status: SHIPPED | OUTPATIENT
Start: 2020-06-30 | End: 2021-05-26

## 2020-06-30 NOTE — PATIENT INSTRUCTIONS
Medicare Wellness Visit, Female The best way to live healthy is to have a lifestyle where you eat a well-balanced diet, exercise regularly, limit alcohol use, and quit all forms of tobacco/nicotine, if applicable. Regular preventive services are another way to keep healthy. Preventive services (vaccines, screening tests, monitoring & exams) can help personalize your care plan, which helps you manage your own care. Screening tests can find health problems at the earliest stages, when they are easiest to treat. [facility] follows the current, evidence-based guidelines published by the [facility] Ernesto Aceves ([facility]) when recommending preventive services for our patients. Because we follow these guidelines, sometimes recommendations change over time as research supports it. (For example, mammograms used to be recommended annually. Even though Medicare will still pay for an annual mammogram, the newer guidelines recommend a mammogram every two years for women of average risk). Of course, you and your doctor may decide to screen more often for some diseases, based on your risk and your co-morbidities (chronic disease you are already diagnosed with). Preventive services for you include: - Medicare offers their members a free annual wellness visit, which is time for you and your primary care provider to discuss and plan for your preventive service needs. Take advantage of this benefit every year! 
-All adults over the age of 72 should receive the recommended pneumonia vaccines. Current USPSTF guidelines recommend a series of two vaccines for the best pneumonia protection.  
-All adults should have a flu vaccine yearly and a tetanus vaccine every 10 years.  
-All adults age 48 and older should receive the shingles vaccines (series of two vaccines). -All adults age 38-68 who are overweight should have a diabetes screening test once every three years. -All adults born between 80 and 1965 should be screened once for Hepatitis C. 
-Other screening tests and preventive services for persons with diabetes include: an eye exam to screen for diabetic retinopathy, a kidney function test, a foot exam, and stricter control over your cholesterol.  
-Cardiovascular screening for adults with routine risk involves an electrocardiogram (ECG) at intervals determined by your doctor.  
-Colorectal cancer screenings should be done for adults age 54-65 with no increased risk factors for colorectal cancer. There are a number of acceptable methods of screening for this type of cancer. Each test has its own benefits and drawbacks. Discuss with your doctor what is most appropriate for you during your annual wellness visit. The different tests include: colonoscopy (considered the best screening method), a fecal occult blood test, a fecal DNA test, and sigmoidoscopy. 
 
-A bone mass density test is recommended when a woman turns 65 to screen for osteoporosis. This test is only recommended one time, as a screening. Some providers will use this same test as a disease monitoring tool if you already have osteoporosis. -Breast cancer screenings are recommended every other year for women of normal risk, age 54-69. 
-Cervical cancer screenings for women over age 72 are only recommended with certain risk factors. Here is a list of your current Health Maintenance items (your personalized list of preventive services) with a due date: 
Health Maintenance Due Topic Date Due  Shingles Vaccine (1 of 2) 03/11/2004 Viki Paz Annual Well Visit  04/22/2020 Medicare Wellness Visit, Female The best way to live healthy is to have a lifestyle where you eat a well-balanced diet, exercise regularly, limit alcohol use, and quit all forms of tobacco/nicotine, if applicable. Regular preventive services are another way to keep healthy.  Preventive services (vaccines, screening tests, monitoring & exams) can help personalize your care plan, which helps you manage your own care. Screening tests can find health problems at the earliest stages, when they are easiest to treat. [facility] follows the current, evidence-based guidelines published by the [facility] Ernesto Aceves ([facility]) when recommending preventive services for our patients. Because we follow these guidelines, sometimes recommendations change over time as research supports it. (For example, mammograms used to be recommended annually. Even though Medicare will still pay for an annual mammogram, the newer guidelines recommend a mammogram every two years for women of average risk). Of course, you and your doctor may decide to screen more often for some diseases, based on your risk and your co-morbidities (chronic disease you are already diagnosed with). Preventive services for you include: - Medicare offers their members a free annual wellness visit, which is time for you and your primary care provider to discuss and plan for your preventive service needs. Take advantage of this benefit every year! 
-All adults over the age of 72 should receive the recommended pneumonia vaccines. Current USPSTF guidelines recommend a series of two vaccines for the best pneumonia protection.  
-All adults should have a flu vaccine yearly and a tetanus vaccine every 10 years.  
-All adults age 48 and older should receive the shingles vaccines (series of two vaccines). -All adults age 38-68 who are overweight should have a diabetes screening test once every three years.  
-All adults born between 80 and 1965 should be screened once for Hepatitis C. 
-Other screening tests and preventive services for persons with diabetes include: an eye exam to screen for diabetic retinopathy, a kidney function test, a foot exam, and stricter control over your cholesterol. -Cardiovascular screening for adults with routine risk involves an electrocardiogram (ECG) at intervals determined by your doctor.  
-Colorectal cancer screenings should be done for adults age 54-65 with no increased risk factors for colorectal cancer. There are a number of acceptable methods of screening for this type of cancer. Each test has its own benefits and drawbacks. Discuss with your doctor what is most appropriate for you during your annual wellness visit. The different tests include: colonoscopy (considered the best screening method), a fecal occult blood test, a fecal DNA test, and sigmoidoscopy. 
 
-A bone mass density test is recommended when a woman turns 65 to screen for osteoporosis. This test is only recommended one time, as a screening. Some providers will use this same test as a disease monitoring tool if you already have osteoporosis. -Breast cancer screenings are recommended every other year for women of normal risk, age 54-69. 
-Cervical cancer screenings for women over age 72 are only recommended with certain risk factors. Here is a list of your current Health Maintenance items (your personalized list of preventive services) with a due date: 
Health Maintenance Due Topic Date Due  Shingles Vaccine (1 of 2) 03/11/2004 [address] [place] [address] Annual Well Visit  04/22/2020 INCREASE METFORMIN TO 4 PER DAY

## 2020-06-30 NOTE — PROGRESS NOTES
This is the Subsequent Medicare Annual Wellness Exam, performed 12 months or more after the Initial AWV or the last Subsequent AWV    I have reviewed the patient's medical history in detail and updated the computerized patient record. History     Patient Active Problem List   Diagnosis Code    Hypercholesterolemia E78.00    Hypertension I10    Depression F32.9    Prediabetes R73.03    Severe obesity (BMI 35.0-39. 9) with comorbidity ([address] E66.01    Controlled type 2 diabetes mellitus with complication, without long-term current use of insulin (HCC) E11.8     Past Medical History:   Diagnosis Date    Depression     Glucose intolerance (impaired glucose tolerance)     Hypercholesterolemia     Hypertension     Menopause     LMP-46years old      Past Surgical History:   Procedure Laterality Date    COLONOSCOPY N/A 8/11/2016    COL[facility]yl [facility] ENDOSCOPY    HX BREAST BIOPSY Left Years ago    Benign surgical biopsy    US GUIDE ASP BREAST LT CYST W NDL Left Years ago    Benign     Current Outpatient Medications   Medication Sig Dispense Refill    lisinopril-hydroCHLOROthiazide (PRINZIDE, ZESTORETIC) 10-12.5 mg per tablet TAKE 1 TABLET BY MOUTH EVERY DAY 90 Tab 0    metoprolol tartrate (LOPRESSOR) 50 mg tablet TAKE 1 TAB BY MOUTH TWO (2) TIMES A DAY. 180 Tab 0    lancets (OneTouch UltraSoft Lancets) misc Use to check blood sugar once daily. DX E11.9 1 Package 11    glucose blood VI test strips (OneTouch Ultra Blue Test Strip) strip Use to check blood sugar once daily. DX E11.9 100 Strip 11    pravastatin (PRAVACHOL) 20 mg tablet TAKE 1 TABLET BY MOUTH EVERY DAY 90 Tab 1    metFORMIN ER (GLUCOPHAGE XR) 500 mg tablet TAKE 1 TABLET BY MOUTH TWICE A DAY WITH MEALS 180 Tab 1    aspirin delayed-release (ASPIR-81) 81 mg tablet Take 1 Tab by mouth daily. 90 Tab 3    dulaglutide (Trulicity) 5.83 TV/2.5 mL sub-q pen 0.5 mL by SubCUTAneous route every seven (7) days.  4 Syringe 1    semaglutide (Ozempic) 0.25 mg/0.2 mL (2 mg/1.5 mL) sub-q pen 0.5 mg by SubCUTAneous route every seven (7) days. 1 Box 0    semaglutide (OZEMPIC) 0.25 mg/0.2 mL (2 mg/1.5 mL) sub-q pen 0.25 mg by SubCUTAneous route every seven (7) days. 1 Box 0    metoprolol succinate (TOPROL-XL) 50 mg XL tablet metoprolol succinate ER 50 mg tablet,extended release 24 hr   Prescribed by non [facility] MD       No Known Allergies    Family History   Problem Relation Age of Onset    Heart Disease Mother     Hypertension Mother     Heart Disease Father     Cancer Father         skin    Hypertension Father     Diabetes Brother     Stroke Maternal Grandmother     Breast Cancer Maternal Aunt         50's     Social History     Tobacco Use    Smoking status: Never Smoker    Smokeless tobacco: Never Used   Substance Use Topics    Alcohol use: Yes     Comment: social       Depression Risk Factor Screening:     3 most recent PHQ Screens 6/30/2020   Little interest or pleasure in doing things Not at all   Feeling down, depressed, irritable, or hopeless Not at all   Total Score PHQ 2 0       Alcohol Risk Factor Screening:   Do you average 1 drink per night or more than 7 drinks a week:  No    On any one occasion in the past three months have you have had more than 3 drinks containing alcohol:  No  2 times week     Functional Ability and Level of Safety:   Hearing: Hearing is good. Activities of Daily Living: The home contains: no safety equipment. Patient does total self care     Ambulation: with no difficulty     Fall Risk:  Fall Risk Assessment, last 12 mths 6/30/2020   Able to walk? Yes   Fall in past 12 months?  No     Abuse Screen:  Patient is not abused   safe lives w      Cognitive Screening   Has your family/caregiver stated any concerns about your memory: no     Cognitive Screening: Normal - Mini Cog Test      No memory concerns   Pt knows the month, day and year   Can recall 3/3 objects   Patient Care Team   Patient Care Team:  Dilma Gong MD as PCP - General (Internal Medicine)  Dilma Gong MD as PCP - [facility] Empaneled Provider  Lesley Deng RN as Benefits Care Manager  Romero Shipman MD (Ophthalmology)  Luz Avery MD (Obstetrics & Gynecology)  Nolan Pal MD (Gastroenterology)  Dr. Jerome Newberry (Dental General Practice)  Updated  Assessment/Plan   Education and counseling provided:  Are appropriate based on today's review and evaluation  Screening Mammography  Bone mass measurement (DEXA)  Diabetes screening test    Diagnoses and all orders for this visit:    1. Hypercholesterolemia  -     METABOLIC PANEL, COMPREHENSIVE  -     CBC W/O DIFF  -     HEMOGLOBIN A1C WITH EAG  -     TSH 3RD GENERATION  -     MICROALBUMIN, UR, 24 HR    2. Controlled type 2 diabetes mellitus with complication, without long-term current use of insulin (HCC)  -     METABOLIC PANEL, COMPREHENSIVE  -     CBC W/O DIFF  -     HEMOGLOBIN A1C WITH EAG  -     TSH 3RD GENERATION  -     MICROALBUMIN, UR, 24 HR    3. Severe obesity (BMI 35.0-39. 9) with comorbidity (HCC)  -     METABOLIC PANEL, COMPREHENSIVE  -     CBC W/O DIFF  -     HEMOGLOBIN A1C WITH EAG  -     TSH 3RD GENERATION  -     MICROALBUMIN, UR, 24 HR    4. Medicare annual wellness visit, subsequent        Health Maintenance Due   Topic Date Due    Shingrix Vaccine Age 49> (1 of 2) 03/11/2004    Medicare Yearly Exam  04/22/2020     ACP not on file. SDMs are her  (Fernando Evangelista) and son Samuel Posada.   Provided information again today.       Colonoscopy: 8/11/16, Dr. Candi Burrell, repeat 10 years  Pap: Dr. Ayad Guerrier every 2 years  Mammogram: 8/19, negative , ordered  AAA: no fmhx  DEXA: 7/25/18, ordered      Tdap: 3/31/2014    Pneumovax: 5/17/2017  [id number]: 07/24/19  Zostavax: 4/05/2015  Shingrix: ordered 08/30/18, reminded, have not gotten yet  Flu shot: 11/20/19    A1c: 6/20 6.9 every 3 months  Eye exam: Dr. Cynthia Rousseau 2/17/20, will get notes --annual     hep C screen: , negative  Lipids: 3/20  LDL 46 annual  EK/18    Medication reconciliation completed by MA and reviewed by me. Medical/surgical/social/family history reviewed and updated by me. Patient provided AVS and preventative screening table. Patient verbalized understanding of all information discussed.

## 2020-07-02 LAB — HBA1C MFR BLD HPLC: 6.9 % (ref 4.8–5.6)

## 2020-07-02 RX ORDER — METOPROLOL TARTRATE 50 MG/1
TABLET ORAL
Qty: 180 TAB | Refills: 0 | Status: SHIPPED | OUTPATIENT
Start: 2020-07-02 | End: 2020-09-24

## 2020-08-10 RX ORDER — LISINOPRIL AND HYDROCHLOROTHIAZIDE 10; 12.5 MG/1; MG/1
TABLET ORAL
Qty: 90 TAB | Refills: 0 | Status: SHIPPED | OUTPATIENT
Start: 2020-08-10 | End: 2021-01-05 | Stop reason: SDUPTHER

## 2020-08-13 RX ORDER — PRAVASTATIN SODIUM 20 MG/1
TABLET ORAL
Qty: 90 TAB | Refills: 1 | Status: SHIPPED | OUTPATIENT
Start: 2020-08-13 | End: 2021-02-14

## 2020-09-21 ENCOUNTER — TELEPHONE (OUTPATIENT)
Dept: INTERNAL MEDICINE CLINIC | Age: 65
End: 2020-09-21

## 2020-09-21 NOTE — TELEPHONE ENCOUNTER
Called out and spoke to pt. Two pt identifiers confirmed. Informed pt that the office is still not doing walk-in labs and pt can get done at another location. Pt states she will get done at her [address] on 9/29/20. Pt verbalized understanding of information discussed w/ no further questions at this time.

## 2020-09-21 NOTE — TELEPHONE ENCOUNTER
----- Message from Amador Tripathi sent at 9/21/2020  9:24 AM EDT -----  Regarding: Pimentel/telephone  Pt is requesting to know if she could come in this week to have lab work before her appointment. Pts number is [phone].     Message copied/pasted from [facility]

## 2020-09-24 RX ORDER — METOPROLOL TARTRATE 50 MG/1
TABLET ORAL
Qty: 180 TAB | Refills: 0 | Status: SHIPPED | OUTPATIENT
Start: 2020-09-24 | End: 2020-12-17

## 2020-09-25 ENCOUNTER — HOSPITAL ENCOUNTER (OUTPATIENT)
Dept: MAMMOGRAPHY | Age: 65
Discharge: HOME | End: 2020-09-25
Attending: INTERNAL MEDICINE
Payer: MEDICARE

## 2020-09-25 DIAGNOSIS — Z78.0 POSTMENOPAUSAL STATE: ICD-10-CM

## 2020-09-25 DIAGNOSIS — Z12.31 ENCOUNTER FOR MAMMOGRAM TO ESTABLISH BASELINE MAMMOGRAM: ICD-10-CM

## 2020-09-25 PROCEDURE — 77080 DXA BONE DENSITY AXIAL: CPT

## 2020-09-25 PROCEDURE — 77067 SCR MAMMO BI INCL CAD: CPT

## 2020-09-25 NOTE — PROGRESS NOTES
HISTORY OF PRESENT ILLNESS  Alexandrea Pillai is a 76 y.o. female. HPI     Last here 6/30/20  Pt is here to f/u on chronic conditions.      BP today is 132/71   Continues on lisinopril-HCTZ 10-12.5mg daily and metoprolol 50mg BID      She is diabetic    BS at home JZIVPAR [id number]  Continues on metformin 500mg  QID, increased from BID lov  Three metformin caused GI upset, so she decreased metformin back to BID   ozempic was too expensive   Recall increased metformin caused diarrhea  States she still gets some bouts of diarrhea from metformin discussed if A1c not improved will start sulfonylurea next     Discussed nutritionist at last office visit she has not been able to do this      Wt is 266 lbs - down 2 lbs x lov  She has been exercising more walking more needs to focus more on portion still  Discussed WW   Discussed diet and w/l       Reviewed labs       Continues on pravachol 20mg daily for cholesterol       No longer on zoloft 50mg after a rash  States she is doing ok without meds  she denies any problems with depression or anxiety today she has been doing quite well         Reviewed dexa 6/30/20 nl  Mammo 8/20/20 needs additional imaging, discussed at length    ACP not on file. SDMs are her  (Robert Gerhard Gaucher) and son Janak Jameson).   Provided information again today.     PREVENTIVE:    Colonoscopy: 8/11/16, Dr. Shamar Lebron, repeat 10 years  Pap: Dr. Olivia Garcia, scheduled 10/20  Mammogram: 8/20/20 needs additional imaging, she will schedule   AAA: no fmhx  DEXA: 6/30/20 nl  Tdap: 3/31/2014    Pneumovax: 5/17/2017  [id number]: 07/24/19  Zostavax: 4/05/2015  Shingrix: ordered 08/30/18, reminded, will think about later   Flu shot: 9/29/20  Foot exam 9/29/20  Microalbumin: 11/19   A1c: 5/17 6.2, 8/17 5.6, 12/17 5.9, 4/18 6.0, 8/18 5.9, 12/18 6.1, 4/19 6.4, 7/19 6.1, 11.19 6.2 3/20 6.7 6/20 6.9  Eye exam: Dr. Jones 2/17/20,reviewed   Hep C screen: 5/17, negative  Lipids: 3/20  LDL 46  EKG: 12/18    Patient Active Problem List    Diagnosis Date Noted    Controlled type 2 diabetes mellitus with complication, without long-term current use of insulin (HCC)     Severe obesity (BMI 35.0-39. 9) with comorbidity ([facility] [address] 04/17/2018    Prediabetes 01/06/2016    Hypercholesterolemia     Hypertension     Depression      Current Outpatient Medications   Medication Sig Dispense Refill    metoprolol tartrate (LOPRESSOR) 50 mg tablet TAKE 1 TABLET BY MOUTH TWICE A  Tab 0    pravastatin (PRAVACHOL) 20 mg tablet TAKE 1 TABLET BY MOUTH EVERY DAY 90 Tab 1    lisinopril-hydroCHLOROthiazide (PRINZIDE, ZESTORETIC) 10-12.5 mg per tablet TAKE 1 TABLET BY MOUTH EVERY DAY 90 Tab 0    metFORMIN ER (GLUCOPHAGE XR) 500 mg tablet TAKE 1 TABLET BY MOUTH TWICE A DAY WITH MEALS 180 Tab 0    lancets (OneTouch UltraSoft Lancets) misc Use to check blood sugar once daily. DX E11.9 1 Package 11    glucose blood VI test strips (OneTouch Ultra Blue Test Strip) strip Use to check blood sugar once daily. DX E11.9 100 Strip 11    metoprolol succinate (TOPROL-XL) 50 mg XL tablet metoprolol succinate ER 50 mg tablet,extended release 24 hr   Prescribed by non [facility] MD      aspirin delayed-release (ASPIR-81) 81 mg tablet Take 1 Tab by mouth daily.  90 Tab 3    varicella-zoster recombinant, PF, (Shingrix, PF,) 50 mcg/0.5 mL susr injection 0.5mL by IntraMUSCular route once now and then repeat in 2-6 months 0.5 mL 1     Past Surgical History:   Procedure Laterality Date    COLONOSCOPY N/A 8/11/2016    COLONOSCOPY performed by Ej Reynoso MD at [address] [place] [address] HX BREAST BIOPSY Left Years ago    Benign surgical biopsy    US GUIDE ASP BREAST LT CYST W NDL Left Years ago    Benign      Lab Results   Component Value Date/Time    WBC 8.8 11/18/2019 01:14 PM    HGB 14.3 11/18/2019 01:14 PM    HCT 41.9 11/18/2019 01:14 PM    PLATELET 400 94/09/8545 01:14 PM    MCV 89 11/18/2019 01:14 PM     Lab Results   Component Value Date/Time Cholesterol, total 132 03/17/2020 11:08 AM    HDL Cholesterol 35 (L) 03/17/2020 11:08 AM    LDL, calculated 46 03/17/2020 11:08 AM    Triglyceride 253 (H) 03/17/2020 11:08 AM    CHOL/HDL Ratio 4.3 08/16/2010 01:49 PM     Lab Results   Component Value Date/Time    GFR est non-AA 83 03/17/2020 11:08 AM    GFR est AA 96 03/17/2020 11:08 AM    Creatinine 0.75 03/17/2020 11:08 AM    BUN 12 03/17/2020 11:08 AM    Sodium 138 03/17/2020 11:08 AM    Potassium 5.0 03/17/2020 11:08 AM    Chloride 98 03/17/2020 11:08 AM    CO2 25 03/17/2020 11:08 AM        Review of Systems   Respiratory: Negative for shortness of breath. Cardiovascular: Negative for chest pain. Physical Exam  Constitutional:       General: She is not in acute distress. Appearance: Normal appearance. She is not ill-appearing, toxic-appearing or diaphoretic. HENT:      Head: Normocephalic and atraumatic. Right Ear: External ear normal.      Left Ear: External ear normal.   Eyes:      General:         Right eye: No discharge. Left eye: No discharge. Conjunctiva/sclera: Conjunctivae normal.      Pupils: Pupils are equal, round, and reactive to light. Neck:      Musculoskeletal: Normal range of motion and neck supple. Cardiovascular:      Rate and Rhythm: Normal rate and regular rhythm. Pulses: Normal pulses. Heart sounds: Normal heart sounds. No murmur. No friction rub. No gallop. Pulmonary:      Effort: No respiratory distress. Breath sounds: Normal breath sounds. No wheezing or rales. Chest:      Chest wall: No tenderness. Musculoskeletal: Normal range of motion. Skin:     General: Skin is warm and dry. Neurological:      Mental Status: She is alert and oriented to person, place, and time. Mental status is at baseline. Coordination: Coordination normal.      Gait: Gait normal.      Comments: Sensory exam of the foot is normal, tested with the monofilament.  Good pulses, no lesions or ulcers, good peripheral pulses. Psychiatric:         Mood and Affect: Mood normal.         Behavior: Behavior normal.         ASSESSMENT and PLAN    ICD-10-CM ICD-9-CM    1. Controlled type 2 diabetes mellitus with complication, without long-term current use of insulin (HCC) \      Last A1c borderline    Home readings borderline    On metformin 500 mg 2 of these per day    GLP-1 agonist and DPP 4 inhibitors are too expensive    Would start Amaryl 1 mg daily next if A1c 7.0 or higher addressed all this with patient       E11.8 250.90 LIPID PANEL      METABOLIC PANEL, COMPREHENSIVE      CBC W/O DIFF      TSH 3RD GENERATION      HEMOGLOBIN A1C WITH EAG      MICROALBUMIN, UR, RAND W/ MICROALB/CREAT RATIO   2. Severe obesity (BMI 35.0-39. 9) with comorbidity ([facility] [address]  E66.01 278.01 LIPID PANEL   Needs work more aggressively on portion control have previously discussed nutritionist and weight watchers   METABOLIC PANEL, COMPREHENSIVE      CBC W/O DIFF      TSH 3RD GENERATION      HEMOGLOBIN A1C WITH EAG   3. Reactive depression  F32.9 300.4 LIPID PANEL   Control without medication   METABOLIC PANEL, COMPREHENSIVE      CBC W/O DIFF      TSH 3RD GENERATION      HEMOGLOBIN A1C WITH EAG   4. Essential hypertension  I10 401.9 LIPID PANEL   Controlled on lisinopril hydrochlorothiazide and metoprolol continue   METABOLIC PANEL, COMPREHENSIVE      CBC W/O DIFF      TSH 3RD GENERATION      HEMOGLOBIN A1C WITH EAG   5. Hypercholesterolemia  E78.00 272.0 LIPID PANEL      METABOLIC PANEL, COMPREHENSIVE   Controlled on Pravachol   CBC W/O DIFF      TSH 3RD GENERATION      HEMOGLOBIN A1C WITH EAG           Scribed by Finn Deluca of [address] [place] [address] [address], as dictated by Dr. Trish Cabrera. Current diagnosis and concerns discussed with pt at length. Pt understands risks and benefits or current treatment plan and medications, and accepts the treatment and medication with any possible risks. Pt asks appropriate questions, which were answered.  Pt was instructed to call with any concerns or problems. I have reviewed the note documented by the scribe. The services provided are my own.   The documentation is accurate

## 2020-09-29 ENCOUNTER — HOSPITAL ENCOUNTER (OUTPATIENT)
Dept: LAB | Age: 65
Discharge: HOME | End: 2020-09-29
Payer: MEDICARE

## 2020-09-29 ENCOUNTER — OFFICE VISIT (OUTPATIENT)
Dept: INTERNAL MEDICINE CLINIC | Age: 65
End: 2020-09-29
Payer: MEDICARE

## 2020-09-29 VITALS
BODY MASS INDEX: 39.46 KG/M2 | RESPIRATION RATE: 16 BRPM | HEIGHT: 69 IN | OXYGEN SATURATION: 95 % | HEART RATE: 73 BPM | TEMPERATURE: 97.8 F | WEIGHT: 266.4 LBS | SYSTOLIC BLOOD PRESSURE: 132 MMHG | DIASTOLIC BLOOD PRESSURE: 71 MMHG

## 2020-09-29 DIAGNOSIS — I10 ESSENTIAL HYPERTENSION: ICD-10-CM

## 2020-09-29 DIAGNOSIS — Z23 NEEDS FLU SHOT: ICD-10-CM

## 2020-09-29 DIAGNOSIS — E66.01 SEVERE OBESITY (BMI 35.0-39.9) WITH COMORBIDITY (HCC): ICD-10-CM

## 2020-09-29 DIAGNOSIS — E11.8 CONTROLLED TYPE 2 DIABETES MELLITUS WITH COMPLICATION, WITHOUT LONG-TERM CURRENT USE OF INSULIN (HCC): Primary | ICD-10-CM

## 2020-09-29 DIAGNOSIS — E78.00 HYPERCHOLESTEROLEMIA: ICD-10-CM

## 2020-09-29 DIAGNOSIS — F32.9 REACTIVE DEPRESSION: ICD-10-CM

## 2020-09-29 PROCEDURE — G8399 PT W/DXA RESULTS DOCUMENT: HCPCS | Performed by: INTERNAL MEDICINE

## 2020-09-29 PROCEDURE — G0463 HOSPITAL OUTPT CLINIC VISIT: HCPCS | Performed by: INTERNAL MEDICINE

## 2020-09-29 PROCEDURE — G8752 SYS BP LESS 140: HCPCS | Performed by: INTERNAL MEDICINE

## 2020-09-29 PROCEDURE — G9717 DOC PT DX DEP/BP F/U NT REQ: HCPCS | Performed by: INTERNAL MEDICINE

## 2020-09-29 PROCEDURE — G8536 NO DOC ELDER MAL SCRN: HCPCS | Performed by: INTERNAL MEDICINE

## 2020-09-29 PROCEDURE — 2022F DILAT RTA XM EVC RTNOPTHY: CPT | Performed by: INTERNAL MEDICINE

## 2020-09-29 PROCEDURE — 3017F COLORECTAL CA SCREEN DOC REV: CPT | Performed by: INTERNAL MEDICINE

## 2020-09-29 PROCEDURE — 84443 ASSAY THYROID STIM HORMONE: CPT

## 2020-09-29 PROCEDURE — G8417 CALC BMI ABV UP PARAM F/U: HCPCS | Performed by: INTERNAL MEDICINE

## 2020-09-29 PROCEDURE — G8754 DIAS BP LESS 90: HCPCS | Performed by: INTERNAL MEDICINE

## 2020-09-29 PROCEDURE — 3044F HG A1C LEVEL LT 7.0%: CPT | Performed by: INTERNAL MEDICINE

## 2020-09-29 PROCEDURE — G8427 DOCREV CUR MEDS BY ELIG CLIN: HCPCS | Performed by: INTERNAL MEDICINE

## 2020-09-29 PROCEDURE — 1101F PT FALLS ASSESS-DOCD LE1/YR: CPT | Performed by: INTERNAL MEDICINE

## 2020-09-29 PROCEDURE — 80053 COMPREHEN METABOLIC PANEL: CPT

## 2020-09-29 PROCEDURE — G9899 SCRN MAM PERF RSLTS DOC: HCPCS | Performed by: INTERNAL MEDICINE

## 2020-09-29 PROCEDURE — 80061 LIPID PANEL: CPT

## 2020-09-29 PROCEDURE — 1090F PRES/ABSN URINE INCON ASSESS: CPT | Performed by: INTERNAL MEDICINE

## 2020-09-29 PROCEDURE — 85027 COMPLETE CBC AUTOMATED: CPT

## 2020-09-29 PROCEDURE — 99214 OFFICE O/P EST MOD 30 MIN: CPT | Performed by: INTERNAL MEDICINE

## 2020-09-29 PROCEDURE — 36415 COLL VENOUS BLD VENIPUNCTURE: CPT

## 2020-09-29 PROCEDURE — 90694 VACC AIIV4 NO PRSRV 0.5ML IM: CPT | Performed by: INTERNAL MEDICINE

## 2020-09-29 PROCEDURE — 83036 HEMOGLOBIN GLYCOSYLATED A1C: CPT

## 2020-09-29 PROCEDURE — 82043 UR ALBUMIN QUANTITATIVE: CPT

## 2020-09-29 NOTE — PROGRESS NOTES
Jelena Arce is a 76 y.o. female who presents for routine immunizations. She denies any symptoms , reactions or allergies that would exclude them from being immunized today. Risks and adverse reactions were discussed and the VIS was given to them. All questions were addressed. She was observed for 5 min post injection. There were no reactions observed.     Elizabeth Jara

## 2020-09-30 LAB
ALBUMIN SERPL-MCNC: 4.6 G/DL (ref 3.8–4.8)
ALBUMIN/CREAT UR: 14 MG/G CREAT (ref 0–29)
ALBUMIN/GLOB SERPL: 1.6 {RATIO} (ref 1.2–2.2)
ALP SERPL-CCNC: 78 IU/L (ref 39–117)
ALT SERPL-CCNC: 27 IU/L (ref 0–32)
AST SERPL-CCNC: 18 IU/L (ref 0–40)
BILIRUB SERPL-MCNC: 0.4 MG/DL (ref 0–1.2)
BUN SERPL-MCNC: 17 MG/DL (ref 8–27)
BUN/CREAT SERPL: 20 (ref 12–28)
CALCIUM SERPL-MCNC: 11 MG/DL (ref 8.7–10.3)
CHLORIDE SERPL-SCNC: 101 MMOL/L (ref 96–106)
CHOLEST SERPL-MCNC: 132 MG/DL (ref 100–199)
CO2 SERPL-SCNC: 24 MMOL/L (ref 20–29)
CREAT SERPL-MCNC: 0.84 MG/DL (ref 0.57–1)
CREAT UR-MCNC: 197.6 MG/DL
ERYTHROCYTE [DISTWIDTH] IN BLOOD BY AUTOMATED COUNT: 12.2 % (ref 11.7–15.4)
EST. AVERAGE GLUCOSE BLD GHB EST-MCNC: 143 MG/DL
GLOBULIN SER CALC-MCNC: 2.8 G/DL (ref 1.5–4.5)
GLUCOSE SERPL-MCNC: 157 MG/DL (ref 65–99)
HBA1C MFR BLD: 6.6 % (ref 4.8–5.6)
HCT VFR BLD AUTO: 43.6 % (ref 34–46.6)
HDLC SERPL-MCNC: 41 MG/DL
HGB BLD-MCNC: 14.5 G/DL (ref 11.1–15.9)
LDLC SERPL CALC-MCNC: 61 MG/DL (ref 0–99)
MCH RBC QN AUTO: 30 PG (ref 26.6–33)
MCHC RBC AUTO-ENTMCNC: 33.3 G/DL (ref 31.5–35.7)
MCV RBC AUTO: 90 FL (ref 79–97)
MICROALBUMIN UR-MCNC: 28.5 UG/ML
PLATELET # BLD AUTO: 239 X10E3/UL (ref 150–450)
POTASSIUM SERPL-SCNC: 4.7 MMOL/L (ref 3.5–5.2)
PROT SERPL-MCNC: 7.4 G/DL (ref 6–8.5)
RBC # BLD AUTO: 4.83 X10E6/UL (ref 3.77–5.28)
SODIUM SERPL-SCNC: 140 MMOL/L (ref 134–144)
TRIGL SERPL-MCNC: 178 MG/DL (ref 0–149)
TSH SERPL DL<=0.005 MIU/L-ACNC: 0.37 UIU/ML (ref 0.45–4.5)
VLDLC SERPL CALC-MCNC: 30 MG/DL (ref 5–40)
WBC # BLD AUTO: 8.6 X10E3/UL (ref 3.4–10.8)

## 2020-09-30 NOTE — PROGRESS NOTES
Please call patient back about results  Persistent subclinical hyper parathyroidism with have her see endocrinology at this time for further evaluation    Mild calcium elevation on recent labs please add intact PTH to blood in lab    Repeat A1c BMP 1 week prior to follow-up  Diabetes improved

## 2020-10-02 DIAGNOSIS — E21.3 HYPERPARATHYROIDISM (HCC): ICD-10-CM

## 2020-10-02 DIAGNOSIS — E11.8 CONTROLLED TYPE 2 DIABETES MELLITUS WITH COMPLICATION, WITHOUT LONG-TERM CURRENT USE OF INSULIN (HCC): Primary | ICD-10-CM

## 2020-10-02 LAB — SPECIMEN STATUS REPORT, ROLRST: NORMAL

## 2020-10-02 NOTE — PROGRESS NOTES
Pt notified via [employer] of results and recommendations per Dr. Vanessa Arcos. Orders placed/pended prn. Pt has read and/or responded w/ understanding of information discussed w/ no further questions at this time.

## 2020-10-05 ENCOUNTER — HOSPITAL ENCOUNTER (OUTPATIENT)
Dept: MAMMOGRAPHY | Age: 65
Discharge: HOME | End: 2020-10-05
Attending: INTERNAL MEDICINE
Payer: MEDICARE

## 2020-10-05 ENCOUNTER — TRANSCRIBE ORDER (OUTPATIENT)
Dept: REGISTRATION | Age: 65
End: 2020-10-05

## 2020-10-05 DIAGNOSIS — R92.8 ABNORMAL MAMMOGRAM: ICD-10-CM

## 2020-10-05 DIAGNOSIS — R92.8 ABNORMAL MAMMOGRAM: Primary | ICD-10-CM

## 2020-10-05 PROCEDURE — 77062 BREAST TOMOSYNTHESIS BI: CPT

## 2020-10-05 PROCEDURE — 76642 ULTRASOUND BREAST LIMITED: CPT

## 2020-10-07 ENCOUNTER — PATIENT MESSAGE (OUTPATIENT)
Dept: INTERNAL MEDICINE CLINIC | Age: 65
End: 2020-10-07

## 2020-10-07 ENCOUNTER — TRANSCRIBE ORDER (OUTPATIENT)
Dept: SCHEDULING | Age: 65
End: 2020-10-07

## 2020-10-07 DIAGNOSIS — R92.8 MAMMOGRAM ABNORMAL: Primary | ICD-10-CM

## 2020-10-07 DIAGNOSIS — R92.8 ABNORMAL MAMMOGRAM: Primary | ICD-10-CM

## 2020-10-08 NOTE — TELEPHONE ENCOUNTER
From: Alexandrea Quinones  To: Nabor Coe MD  Sent: 10/7/2020 12:08 PM EDT  Subject: Referral Request    Need request from Dr. Anna Bañuelos, for a 6mos. Ultra Sound, and 3D Mammogram, for April 2021 for Alexandrea Quinones. To be done at [facility] [address] [facility] [address] [phone].  Cannot schedule my appointment until they have another request. Thank You Samantha Perez

## 2020-10-11 RX ORDER — METFORMIN HYDROCHLORIDE 500 MG/1
TABLET, EXTENDED RELEASE ORAL
Qty: 180 TAB | Refills: 0 | Status: SHIPPED | OUTPATIENT
Start: 2020-10-11 | End: 2021-01-03

## 2020-12-17 RX ORDER — METOPROLOL TARTRATE 50 MG/1
TABLET ORAL
Qty: 180 TAB | Refills: 0 | Status: SHIPPED | OUTPATIENT
Start: 2020-12-17 | End: 2021-03-11

## 2020-12-23 ENCOUNTER — TELEPHONE (OUTPATIENT)
Dept: INTERNAL MEDICINE CLINIC | Age: 65
End: 2020-12-23

## 2020-12-23 NOTE — TELEPHONE ENCOUNTER
Christina Lema. [place] [address] Office Pool             Caller's first and last name: self   Reason for call: Did not schedule a virtual visit. Would like this appointment to be in person. Callback required yes/no and why: Yes, to change the visit from virtual to in person. Best contact number(s): [phone]   Details to clarify the request: Stated that she will not do any virtual visits.

## 2021-01-03 RX ORDER — METFORMIN HYDROCHLORIDE 500 MG/1
TABLET, EXTENDED RELEASE ORAL
Qty: 180 TAB | Refills: 0 | Status: SHIPPED | OUTPATIENT
Start: 2021-01-03 | End: 2021-03-28

## 2021-01-05 RX ORDER — LISINOPRIL AND HYDROCHLOROTHIAZIDE 10; 12.5 MG/1; MG/1
TABLET ORAL
Qty: 90 TAB | Refills: 0 | Status: SHIPPED | OUTPATIENT
Start: 2021-01-05 | End: 2021-04-01

## 2021-01-05 NOTE — TELEPHONE ENCOUNTER
Future Appointments:  Future Appointments   Date Time Provider Brandon Eugenei   2/24/2021  1:45 PM David Carter MD [facility] BS AMB        Last Appointment With Me:  9/29/2020     Requested Prescriptions     Pending Prescriptions Disp Refills    lisinopril-hydroCHLOROthiazide (PRINZIDE, ZESTORETIC) 10-12.5 mg per tablet 90 Tab 0     Sig: TAKE 1 TABLET BY MOUTH EVERY DAY

## 2021-02-14 RX ORDER — PRAVASTATIN SODIUM 20 MG/1
TABLET ORAL
Qty: 90 TAB | Refills: 1 | Status: SHIPPED | OUTPATIENT
Start: 2021-02-14 | End: 2021-08-11

## 2021-02-22 NOTE — PROGRESS NOTES
HISTORY OF PRESENT ILLNESS  Alexandrea Zamora is a 76 y.o. female. HPI   Eduar Marco 20  Pt is here to f/u on chronic conditions.      BP today is 135/79  BP at home 125/70  Continues on lisinopril-HCTZ 10-12.5mg daily and metoprolol 50mg BID      She is diabetic     137 125 in am   Continues on metformin 2 tabs 500mg  BID  ozempic was too expensive   Recall increased metformin caused diarrhea     Wt is 260 lbs - down 6 lbs x lov    She changed her diet to eliminate carbs  She has been trying to exercise more   Discussed diet and w/l       Reviewed labs   Will get labs today     She has apt with Dr. Bob Diaz (endo)  for hyperthyroidism       Continues on pravachol 20mg daily for cholesterol       No longer on zoloft 50mg after a rash  States she is doing ok without meds  she denies any problems with depression or anxiety today she has been doing quite well     Reviewed mammo & us 10/5/20: abnl, repeat     ACP not on file. SDMs are her  (Fernando Evangelista) and son Yoko Velazquez). Provided information again today.     PREVENTIVE:    Colonoscopy: 15, Dr. Cielo Overton, repeat 10 years  Pap: Dr. Madhu Holman, 10/12/20  Mammogram: 10/5/20 abnl, scheduled 21  AAA: no fmhx  DEXA: 20 nl  Tdap: 3/31/2014    Pneumovax: 2017  [id number]: 19  Zostavax: 2015  Shingrix: ordered 18, reminded, will think about later   Flu shot: 20  Foot exam 20  Microalbumin:   A1c:  6.2,  5.6,  5.9,  6.0,  5.9,  6.1,  6.4,  6.1, 11. 6.2 3/20 6.7  6.9  6.6  Eye exam: Agueda Morrison 20, needs to reschedule   Hep C screen: , negative  Lipids:   LDL 61  EK/18  Covid: completed both (moderna)    Patient Active Problem List    Diagnosis Date Noted    Controlled type 2 diabetes mellitus with complication, without long-term current use of insulin (HCC)     Severe obesity (BMI 35.0-39. 9) with comorbidity ([facility] [address] 2018    Prediabetes 01/06/2016    Hypercholesterolemia     Hypertension     Depression      Current Outpatient Medications   Medication Sig Dispense Refill    lancets (OneTouch UltraSoft Lancets) misc Use to check blood sugar 4 times daily. DX E11.9 1 Package 11    glucose blood VI test strips (OneTouch Ultra Blue Test Strip) strip Use to check blood sugar 4 times daily. DX E11.9 100 Strip 11    pravastatin (PRAVACHOL) 20 mg tablet TAKE 1 TABLET BY MOUTH EVERY DAY 90 Tab 1    lisinopril-hydroCHLOROthiazide (PRINZIDE, ZESTORETIC) 10-12.5 mg per tablet TAKE 1 TABLET BY MOUTH EVERY DAY 90 Tab 0    metFORMIN ER (GLUCOPHAGE XR) 500 mg tablet TAKE 1 TABLET BY MOUTH TWICE A DAY WITH MEALS 180 Tab 0    metoprolol tartrate (LOPRESSOR) 50 mg tablet TAKE 1 TABLET BY MOUTH TWICE A  Tab 0    aspirin delayed-release (ASPIR-81) 81 mg tablet Take 1 Tab by mouth daily.  90 Tab 3    varicella-zoster recombinant, PF, (Shingrix, PF,) 50 mcg/0.5 mL susr injection 0.5mL by IntraMUSCular route once now and then repeat in 2-6 months 0.5 mL 1     Past Surgical History:   Procedure Laterality Date    COLONOSCOPY N/A 8/11/2016    [facility] by Les Francis MD [facility]COPY    HX BREAST BIOPSY Left Years ago    Benign surgical biopsy    US GUIDE ASP BREAST LT CYST W NDL Left Years ago    Benign      Lab Results   Component Value Date/Time    WBC 8.6 09/29/2020 08:39 AM    HGB 14.5 09/29/2020 08:39 AM    HCT 43.6 09/29/2020 08:39 AM    PLATELET 604 89/98/1952 08:39 AM    MCV 90 09/29/2020 08:39 AM     Lab Results   Component Value Date/Time    Cholesterol, total 132 09/29/2020 08:39 AM    HDL Cholesterol 41 09/29/2020 08:39 AM    LDL, calculated 61 09/29/2020 08:39 AM    LDL, calculated 46 03/17/2020 11:08 AM    Triglyceride 178 (H) 09/29/2020 08:39 AM    CHOL/HDL Ratio 4.3 08/16/2010 01:49 PM     Lab Results   Component Value Date/Time    GFR est non-AA 73 09/29/2020 08:39 AM    GFR est AA 84 09/29/2020 08:39 AM Creatinine 0.84 09/29/2020 08:39 AM    BUN 17 09/29/2020 08:39 AM    Sodium 140 09/29/2020 08:39 AM    Potassium 4.7 09/29/2020 08:39 AM    Chloride 101 09/29/2020 08:39 AM    CO2 24 09/29/2020 08:39 AM        Review of Systems   Respiratory: Negative for shortness of breath. Cardiovascular: Negative for chest pain. Physical Exam  Constitutional:       General: She is not in acute distress. Appearance: Normal appearance. She is not ill-appearing, toxic-appearing or diaphoretic. HENT:      Head: Normocephalic and atraumatic. Right Ear: External ear normal.      Left Ear: External ear normal.   Eyes:      General:         Right eye: No discharge. Left eye: No discharge. Conjunctiva/sclera: Conjunctivae normal.      Pupils: Pupils are equal, round, and reactive to light. Neck:      Musculoskeletal: Normal range of motion and neck supple. Cardiovascular:      Rate and Rhythm: Normal rate and regular rhythm. Pulses: Normal pulses. Heart sounds: Normal heart sounds. No murmur. No friction rub. No gallop. Pulmonary:      Effort: No respiratory distress. Breath sounds: Normal breath sounds. No wheezing or rales. Chest:      Chest wall: No tenderness. Musculoskeletal: Normal range of motion. Right lower leg: No edema. Left lower leg: No edema. Skin:     General: Skin is warm and dry. Neurological:      Mental Status: She is alert and oriented to person, place, and time. Mental status is at baseline. Coordination: Coordination normal.      Gait: Gait normal.   Psychiatric:         Mood and Affect: Mood normal.         Behavior: Behavior normal.         ASSESSMENT and PLAN    ICD-10-CM ICD-9-CM    1. Severe obesity (BMI 35.0-39. 9) with comorbidity ([facility] [address]     Working on weight loss weight improved a few pounds working on portions low-carb diet     E66.01 278.01    2.  Type 2 diabetes mellitus without complication, without long-term current use of insulin (HCC)  E11.9 250.00 lancets (OneTouch UltraSoft Lancets) [facility]   Home readings improved taking Metformin 500 mg 4 of these per day    Check A1c adjust dose further as needed    Eye exam is scheduled       glucose blood VI test strips (OneTouch Ultra Blue Test Strip) strip   3. Essential hypertension       Controlled on lisinopril hydrochlorothiazide and metoprolol continue no changes     I10 401.9    4. Hypercholesterolemia         Controlled on Pravachol     E78.00 272.0    5. Reactive depression       Doing well without medication     F32.9 300.4    6. Subclinical hyperthyroidism       Monitoring TFTs will be seeing endocrinology in April    We will repeat TFTs today     E05.90 242.90     7 hypercalcemia repeat calcium level and intact PTH will be seeing endocrinology in April as well       Scribed by Jessica Fang of [address] [place] [address] [address], as dictated by Dr. Ar Heredia. Current diagnosis and concerns discussed with pt at length. Pt understands risks and benefits or current treatment plan and medications, and accepts the treatment and medication with any possible risks. Pt asks appropriate questions, which were answered. Pt was instructed to call with any concerns or problems. I have reviewed the note documented by the scribe. The services provided are my own.   The documentation is accurate

## 2021-02-24 ENCOUNTER — HOSPITAL ENCOUNTER (OUTPATIENT)
Dept: LAB | Age: 66
Discharge: HOME | End: 2021-02-24
Payer: MEDICARE

## 2021-02-24 ENCOUNTER — OFFICE VISIT (OUTPATIENT)
Dept: INTERNAL MEDICINE CLINIC | Age: 66
End: 2021-02-24
Payer: MEDICARE

## 2021-02-24 VITALS
OXYGEN SATURATION: 98 % | HEART RATE: 73 BPM | SYSTOLIC BLOOD PRESSURE: 135 MMHG | DIASTOLIC BLOOD PRESSURE: 79 MMHG | HEIGHT: 69 IN | WEIGHT: 260 LBS | TEMPERATURE: 97.5 F | BODY MASS INDEX: 38.51 KG/M2 | RESPIRATION RATE: 16 BRPM

## 2021-02-24 DIAGNOSIS — E78.00 HYPERCHOLESTEROLEMIA: ICD-10-CM

## 2021-02-24 DIAGNOSIS — E11.9 TYPE 2 DIABETES MELLITUS WITHOUT COMPLICATION, WITHOUT LONG-TERM CURRENT USE OF INSULIN (HCC): Primary | ICD-10-CM

## 2021-02-24 DIAGNOSIS — F32.9 REACTIVE DEPRESSION: ICD-10-CM

## 2021-02-24 DIAGNOSIS — E83.52 HYPERCALCEMIA: ICD-10-CM

## 2021-02-24 DIAGNOSIS — E66.01 SEVERE OBESITY (BMI 35.0-39.9) WITH COMORBIDITY (HCC): ICD-10-CM

## 2021-02-24 DIAGNOSIS — I10 ESSENTIAL HYPERTENSION: ICD-10-CM

## 2021-02-24 DIAGNOSIS — E05.90 SUBCLINICAL HYPERTHYROIDISM: ICD-10-CM

## 2021-02-24 PROCEDURE — 2022F DILAT RTA XM EVC RTNOPTHY: CPT | Performed by: INTERNAL MEDICINE

## 2021-02-24 PROCEDURE — 84439 ASSAY OF FREE THYROXINE: CPT

## 2021-02-24 PROCEDURE — 36415 COLL VENOUS BLD VENIPUNCTURE: CPT

## 2021-02-24 PROCEDURE — G8752 SYS BP LESS 140: HCPCS | Performed by: INTERNAL MEDICINE

## 2021-02-24 PROCEDURE — 3017F COLORECTAL CA SCREEN DOC REV: CPT | Performed by: INTERNAL MEDICINE

## 2021-02-24 PROCEDURE — G8417 CALC BMI ABV UP PARAM F/U: HCPCS | Performed by: INTERNAL MEDICINE

## 2021-02-24 PROCEDURE — G8536 NO DOC ELDER MAL SCRN: HCPCS | Performed by: INTERNAL MEDICINE

## 2021-02-24 PROCEDURE — G8754 DIAS BP LESS 90: HCPCS | Performed by: INTERNAL MEDICINE

## 2021-02-24 PROCEDURE — 99214 OFFICE O/P EST MOD 30 MIN: CPT | Performed by: INTERNAL MEDICINE

## 2021-02-24 PROCEDURE — G9899 SCRN MAM PERF RSLTS DOC: HCPCS | Performed by: INTERNAL MEDICINE

## 2021-02-24 PROCEDURE — 83036 HEMOGLOBIN GLYCOSYLATED A1C: CPT

## 2021-02-24 PROCEDURE — 1090F PRES/ABSN URINE INCON ASSESS: CPT | Performed by: INTERNAL MEDICINE

## 2021-02-24 PROCEDURE — 83970 ASSAY OF PARATHORMONE: CPT

## 2021-02-24 PROCEDURE — 3046F HEMOGLOBIN A1C LEVEL >9.0%: CPT | Performed by: INTERNAL MEDICINE

## 2021-02-24 PROCEDURE — 1101F PT FALLS ASSESS-DOCD LE1/YR: CPT | Performed by: INTERNAL MEDICINE

## 2021-02-24 PROCEDURE — G9717 DOC PT DX DEP/BP F/U NT REQ: HCPCS | Performed by: INTERNAL MEDICINE

## 2021-02-24 PROCEDURE — G0463 HOSPITAL OUTPT CLINIC VISIT: HCPCS | Performed by: INTERNAL MEDICINE

## 2021-02-24 PROCEDURE — 84443 ASSAY THYROID STIM HORMONE: CPT

## 2021-02-24 PROCEDURE — G8399 PT W/DXA RESULTS DOCUMENT: HCPCS | Performed by: INTERNAL MEDICINE

## 2021-02-24 PROCEDURE — 80053 COMPREHEN METABOLIC PANEL: CPT

## 2021-02-24 PROCEDURE — G8427 DOCREV CUR MEDS BY ELIG CLIN: HCPCS | Performed by: INTERNAL MEDICINE

## 2021-02-24 RX ORDER — LANCETS
EACH MISCELLANEOUS
Qty: 1 PACKAGE | Refills: 11 | Status: SHIPPED | OUTPATIENT
Start: 2021-02-24 | End: 2021-10-26

## 2021-02-25 LAB
ALBUMIN SERPL-MCNC: 4.7 G/DL (ref 3.8–4.8)
ALBUMIN/GLOB SERPL: 1.7 {RATIO} (ref 1.2–2.2)
ALP SERPL-CCNC: 74 IU/L (ref 39–117)
ALT SERPL-CCNC: 32 IU/L (ref 0–32)
AST SERPL-CCNC: 24 IU/L (ref 0–40)
BILIRUB SERPL-MCNC: 0.6 MG/DL (ref 0–1.2)
BUN SERPL-MCNC: 14 MG/DL (ref 8–27)
BUN/CREAT SERPL: 16 (ref 12–28)
CALCIUM SERPL-MCNC: 10.6 MG/DL (ref 8.7–10.3)
CHLORIDE SERPL-SCNC: 101 MMOL/L (ref 96–106)
CO2 SERPL-SCNC: 24 MMOL/L (ref 20–29)
CREAT SERPL-MCNC: 0.86 MG/DL (ref 0.57–1)
EST. AVERAGE GLUCOSE BLD GHB EST-MCNC: 148 MG/DL
GLOBULIN SER CALC-MCNC: 2.8 G/DL (ref 1.5–4.5)
GLUCOSE SERPL-MCNC: 115 MG/DL (ref 65–99)
HBA1C MFR BLD: 6.8 % (ref 4.8–5.6)
POTASSIUM SERPL-SCNC: 5.1 MMOL/L (ref 3.5–5.2)
PROT SERPL-MCNC: 7.5 G/DL (ref 6–8.5)
PTH-INTACT SERPL-MCNC: 46 PG/ML (ref 15–65)
SODIUM SERPL-SCNC: 138 MMOL/L (ref 134–144)
T4 FREE SERPL-MCNC: 1.32 NG/DL (ref 0.82–1.77)
TSH SERPL DL<=0.005 MIU/L-ACNC: 0.03 UIU/ML (ref 0.45–4.5)

## 2021-02-25 NOTE — PROGRESS NOTES
Please call patient back about results  Calcium levels still elevated andtsh more suppressed, she has endo appointment but not till April, needs  eval sooner, should accept a vv appointment with [facility] earlier if available

## 2021-02-25 NOTE — PROGRESS NOTES
Pt notified via [employer] of results and recommendations per Dr. Lanza [facility]  Orders placed/pended prn. Pt has read and/or responded w/ understanding of information discussed w/ no further questions at this time.

## 2021-03-04 DIAGNOSIS — E83.52 HYPERCALCEMIA: Primary | ICD-10-CM

## 2021-03-11 RX ORDER — METOPROLOL TARTRATE 50 MG/1
TABLET ORAL
Qty: 180 TAB | Refills: 0 | Status: SHIPPED | OUTPATIENT
Start: 2021-03-11 | End: 2021-06-10

## 2021-03-28 RX ORDER — METFORMIN HYDROCHLORIDE 500 MG/1
TABLET, EXTENDED RELEASE ORAL
Qty: 180 TAB | Refills: 0 | Status: SHIPPED | OUTPATIENT
Start: 2021-03-28 | End: 2021-07-15

## 2021-04-01 RX ORDER — LISINOPRIL AND HYDROCHLOROTHIAZIDE 10; 12.5 MG/1; MG/1
TABLET ORAL
Qty: 90 TAB | Refills: 0 | Status: SHIPPED | OUTPATIENT
Start: 2021-04-01 | End: 2021-04-24

## 2021-04-02 ENCOUNTER — HOSPITAL ENCOUNTER (OUTPATIENT)
Dept: MAMMOGRAPHY | Age: 66
Discharge: HOME | End: 2021-04-02
Attending: INTERNAL MEDICINE
Payer: MEDICARE

## 2021-04-02 PROCEDURE — 77062 BREAST TOMOSYNTHESIS BI: CPT

## 2021-04-08 DIAGNOSIS — R92.8 ABNORMAL MAMMOGRAM: ICD-10-CM

## 2021-04-23 DIAGNOSIS — E11.9 TYPE 2 DIABETES MELLITUS WITHOUT COMPLICATION, WITHOUT LONG-TERM CURRENT USE OF INSULIN (HCC): ICD-10-CM

## 2021-04-24 RX ORDER — LISINOPRIL AND HYDROCHLOROTHIAZIDE 10; 12.5 MG/1; MG/1
TABLET ORAL
Qty: 90 TAB | Refills: 0 | Status: SHIPPED | OUTPATIENT
Start: 2021-04-24 | End: 2021-05-26

## 2021-05-25 NOTE — PROGRESS NOTES
HISTORY OF PRESENT ILLNESS  Alexandrea Jam Garcia is a 78 y.o. female. HPI     Raven Olivas 2/24/21  Pt is here to f/u on chronic conditions.      BP today is 136/76   BP at [facility] 128/80  Continues on  metoprolol 50mg BID   Endo stopped lisinopril-hctz 10-12.5 and changed her to lisinopril 20 mg     She is diabetic    BS 98, 109-115 avg   Continues on metformin 2 tabs 500mg  BID  kapros started her on trulicity 1.5 mg   Recall increased metformin caused diarrhea and ozempic was too expensive     Wt is 254 lbs - down 6 lbs x lov   Discussed diet and w/l       Reviewed labs   Reviewed labs from [facility] 4/28/21: A1c 6.4, ldl 54  Reviewed labs from [facility] 5/24/21: A1c 6.0, tsh 0.024, free t4 1.1  Labs showed she is a carrier for hemochromatosis, discussed this      She is now following with  Dr. Antwan Villatoro ([facility])  for hyperthyroidism   Last there 5/24/21: will be getting US thyroid, stopped lisinopril-hctz and changed to lisinopril 20 mg, started on trulicity     Reviewed note 5/24/21: bp 128/80, increase vit d to 4000U, will get thyroid US       Continues on pravachol 20mg daily for cholesterol       No longer on zoloft 50mg after a rash, doing well - no issues with depression/anxiety     Murmur heard on exam  Reviewed Echo Stress test 1/18/19, will monitor     Reviewed mammo        ACP not on file. SDMs are her  (Fernando Evangelista) and son Eulalio Gregorio).   Provided information again today.     PREVENTIVE:    Colonoscopy: 8/11/16, Dr. Emam Lowery, repeat 10 years  Pap: Dr. Ned edward, scheduled 10/1/21  AAA: no fmhx  DEXA: 6/30/20 nl  Tdap: 3/31/2014    Pneumovax: 5/17/2017  [id number]: 07/24/19  Zostavax: 4/05/2015  Shingrix: ordered 08/30/18, reminded   Flu shot: 9/29/20  Foot exam 05/26/21  Microalbumin: 4/21 [facility]   A1c:   4/19 6.4, 7/19 6.1, 11.19 6.2 3/20 6.7 6/20 6.9 9/20 6.6 4/21 kapros 6.4 5/21 6.0 kapros  Eye exam: Dr. Jones 2/17/20, needs to reschedule   Hep C screen: 5/17, negative  Lipids:   LDL 54 kapros  EK/18  Covid: completed both (moderna)    Patient Active Problem List    Diagnosis Date Noted    Controlled type 2 diabetes mellitus with complication, without long-term current use of insulin (HCC)     Severe obesity (BMI 35.0-39. 9) with comorbidity ([address] 2018    Prediabetes 2016    Hypercholesterolemia     Hypertension     Depression      Current Outpatient Medications   Medication Sig Dispense Refill    cholecalciferol (VITAMIN D3) (2,000 UNITS /50 MCG) cap capsule Take 4,000 Units by mouth daily.  lisinopriL (PRINIVIL, ZESTRIL) 20 mg tablet       dulaglutide (Trulicity) 1.5 RE/2.4 mL sub-q pen 1.5 mg by SubCUTAneous route every seven (7) days.  Blood-Glucose Meter (OneTouch Verio Reflect Meter) misc by Does Not Apply route.  metFORMIN ER (GLUCOPHAGE XR) 500 mg tablet TAKE 1 TABLET BY MOUTH TWICE A DAY WITH MEALS 180 Tab 0    metoprolol tartrate (LOPRESSOR) 50 mg tablet TAKE 1 TABLET BY MOUTH TWICE A  Tab 0    pravastatin (PRAVACHOL) 20 mg tablet TAKE 1 TABLET BY MOUTH EVERY DAY 90 Tab 1    aspirin delayed-release (ASPIR-81) 81 mg tablet Take 1 Tab by mouth daily. 90 Tab 3    glucose blood VI test strips (OneTouch Ultra Blue Test Strip) strip USE TO CHECK BLOOD SUGAR ONCE DAILY. DX E11.9 (Patient not taking: Reported on 2021) 100 Strip 11    lancets (OneTouch UltraSoft Lancets) misc Use to check blood sugar 4 times daily.  DX E11.9 (Patient not taking: Reported on 2021) 1 Package 11     Past Surgical History:   Procedure Laterality Date    COLONOSCOPY N/A 2016    COL[facility]COPY performed by [facility] at [facility] ENDOSCOPY    HX BREAST BIOPSY Left Years ago    Benign surgical biopsy    US GUIDE ASP BREAST LT CYST W NDL Left Years ago    Benign      Lab Results   Component Value Date/Time    WBC 8.6 2020 08:39 AM    HGB 14.5 2020 08:39 AM    HCT 43.6 2020 08:39 AM    PLATELET 619 09/29/2020 08:39 AM    MCV 90 09/29/2020 08:39 AM     Lab Results   Component Value Date/Time    Cholesterol, total 132 09/29/2020 08:39 AM    HDL Cholesterol 41 09/29/2020 08:39 AM    LDL, calculated 61 09/29/2020 08:39 AM    LDL, calculated 46 03/17/2020 11:08 AM    LDL-C, External 54 05/03/2021 12:00 AM    Triglyceride 178 (H) 09/29/2020 08:39 AM    CHOL/HDL Ratio 4.3 08/16/2010 01:49 PM     Lab Results   Component Value Date/Time    GFR est non-AA 71 02/24/2021 12:48 PM    GFR est AA 81 02/24/2021 12:48 PM    Creatinine 0.86 02/24/2021 12:48 PM    BUN 14 02/24/2021 12:48 PM    Sodium 138 02/24/2021 12:48 PM    Potassium 5.1 02/24/2021 12:48 PM    Chloride 101 02/24/2021 12:48 PM    CO2 24 02/24/2021 12:48 PM    PTH, Intact 46 02/24/2021 12:48 PM        Review of Systems   Respiratory: Negative for shortness of breath. Cardiovascular: Negative for chest pain. Physical Exam  Constitutional:       General: She is not in acute distress. Appearance: Normal appearance. She is not ill-appearing, toxic-appearing or diaphoretic. HENT:      Head: Normocephalic and atraumatic. Right Ear: External ear normal.      Left Ear: External ear normal.   Eyes:      General:         Right eye: No discharge. Left eye: No discharge. Conjunctiva/sclera: Conjunctivae normal.      Pupils: Pupils are equal, round, and reactive to light. Cardiovascular:      Rate and Rhythm: Normal rate and regular rhythm. Pulses: Normal pulses. Heart sounds: Murmur (slight) heard. No friction rub. No gallop. Pulmonary:      Effort: No respiratory distress. Breath sounds: Normal breath sounds. No wheezing or rales. Chest:      Chest wall: No tenderness. Musculoskeletal:         General: Normal range of motion. Cervical back: Normal range of motion and neck supple. Skin:     General: Skin is warm and dry.    Neurological:      Mental Status: She is alert and oriented to person, place, and time. Mental status is at baseline. Coordination: Coordination normal.      Gait: Gait normal.      Comments: Sensory exam of the foot is normal, tested with the monofilament. Good pulses, no lesions or ulcers, good peripheral pulses. Psychiatric:         Mood and Affect: Mood normal.         Behavior: Behavior normal.         ASSESSMENT and PLAN    ICD-10-CM ICD-9-CM    1. Controlled type 2 diabetes mellitus with complication, without long-term current use of insulin (HCC)     Controlled on Metformin and Trulicity continue       E11.8 250.90  DIABETES FOOT EXAM   2. Severe obesity (BMI 35.0-39. 9) with comorbidity ([address]       Weight improving down 10 pounds since last office visit Trulicity assisting continue to work on diet and portions     E66.01 278.01    3. Essential hypertension       Controlled lisinopril continue I10 401.9    4. Hypercholesterolemia       Controlled on Pravachol     E78.00 272.0    5. Reactive depression      Doing well without medication F32.9 300.4    6. Hyperthyroidism       Monitor TFTs E05.90 242.90            Scribed by Lela Cm, as dictated by Dr. Silvia Bundy. Current diagnosis and concerns discussed with pt at length. Pt understands risks and benefits or current treatment plan and medications, and accepts the treatment and medication with any possible risks. Pt asks appropriate questions, which were answered. Pt was instructed to call with any concerns or problems. I have reviewed the note documented by the scribe. The services provided are my own.   The documentation is accurate

## 2021-05-26 ENCOUNTER — OFFICE VISIT (OUTPATIENT)
Dept: INTERNAL MEDICINE CLINIC | Age: 66
End: 2021-05-26
Payer: MEDICARE

## 2021-05-26 VITALS
HEIGHT: 69 IN | OXYGEN SATURATION: 97 % | RESPIRATION RATE: 16 BRPM | DIASTOLIC BLOOD PRESSURE: 76 MMHG | SYSTOLIC BLOOD PRESSURE: 136 MMHG | BODY MASS INDEX: 37.68 KG/M2 | WEIGHT: 254.4 LBS | HEART RATE: 73 BPM | TEMPERATURE: 97.6 F

## 2021-05-26 DIAGNOSIS — E78.00 HYPERCHOLESTEROLEMIA: ICD-10-CM

## 2021-05-26 DIAGNOSIS — F32.9 REACTIVE DEPRESSION: ICD-10-CM

## 2021-05-26 DIAGNOSIS — E05.90 HYPERTHYROIDISM: ICD-10-CM

## 2021-05-26 DIAGNOSIS — I10 ESSENTIAL HYPERTENSION: ICD-10-CM

## 2021-05-26 DIAGNOSIS — E66.01 SEVERE OBESITY (BMI 35.0-39.9) WITH COMORBIDITY (HCC): ICD-10-CM

## 2021-05-26 DIAGNOSIS — E11.8 CONTROLLED TYPE 2 DIABETES MELLITUS WITH COMPLICATION, WITHOUT LONG-TERM CURRENT USE OF INSULIN (HCC): Primary | ICD-10-CM

## 2021-05-26 PROCEDURE — G0463 HOSPITAL OUTPT CLINIC VISIT: HCPCS | Performed by: INTERNAL MEDICINE

## 2021-05-26 PROCEDURE — G9717 DOC PT DX DEP/BP F/U NT REQ: HCPCS | Performed by: INTERNAL MEDICINE

## 2021-05-26 PROCEDURE — G8754 DIAS BP LESS 90: HCPCS | Performed by: INTERNAL MEDICINE

## 2021-05-26 PROCEDURE — 2022F DILAT RTA XM EVC RTNOPTHY: CPT | Performed by: INTERNAL MEDICINE

## 2021-05-26 PROCEDURE — 1101F PT FALLS ASSESS-DOCD LE1/YR: CPT | Performed by: INTERNAL MEDICINE

## 2021-05-26 PROCEDURE — 1090F PRES/ABSN URINE INCON ASSESS: CPT | Performed by: INTERNAL MEDICINE

## 2021-05-26 PROCEDURE — G8417 CALC BMI ABV UP PARAM F/U: HCPCS | Performed by: INTERNAL MEDICINE

## 2021-05-26 PROCEDURE — 3044F HG A1C LEVEL LT 7.0%: CPT | Performed by: INTERNAL MEDICINE

## 2021-05-26 PROCEDURE — 99214 OFFICE O/P EST MOD 30 MIN: CPT | Performed by: INTERNAL MEDICINE

## 2021-05-26 PROCEDURE — G8399 PT W/DXA RESULTS DOCUMENT: HCPCS | Performed by: INTERNAL MEDICINE

## 2021-05-26 PROCEDURE — G8536 NO DOC ELDER MAL SCRN: HCPCS | Performed by: INTERNAL MEDICINE

## 2021-05-26 PROCEDURE — G8427 DOCREV CUR MEDS BY ELIG CLIN: HCPCS | Performed by: INTERNAL MEDICINE

## 2021-05-26 PROCEDURE — 3017F COLORECTAL CA SCREEN DOC REV: CPT | Performed by: INTERNAL MEDICINE

## 2021-05-26 PROCEDURE — G8752 SYS BP LESS 140: HCPCS | Performed by: INTERNAL MEDICINE

## 2021-05-26 PROCEDURE — G9899 SCRN MAM PERF RSLTS DOC: HCPCS | Performed by: INTERNAL MEDICINE

## 2021-05-26 RX ORDER — LISINOPRIL 20 MG/1
20 TABLET ORAL DAILY
COMMUNITY
Start: 2021-05-06

## 2021-05-26 RX ORDER — BLOOD-GLUCOSE METER
EACH MISCELLANEOUS
COMMUNITY

## 2021-05-26 RX ORDER — ACETAMINOPHEN 500 MG
4000 TABLET ORAL DAILY
COMMUNITY

## 2021-05-26 RX ORDER — DULAGLUTIDE 1.5 MG/.5ML
1.5 INJECTION, SOLUTION SUBCUTANEOUS
COMMUNITY
End: 2022-01-31

## 2021-05-27 ENCOUNTER — TRANSCRIBE ORDER (OUTPATIENT)
Dept: SCHEDULING | Age: 66
End: 2021-05-27

## 2021-05-27 DIAGNOSIS — E05.10 TOXIC THYROID NODULE: Primary | ICD-10-CM

## 2021-06-10 RX ORDER — METOPROLOL TARTRATE 50 MG/1
TABLET ORAL
Qty: 180 TABLET | Refills: 0 | Status: SHIPPED | OUTPATIENT
Start: 2021-06-10 | End: 2021-09-03

## 2021-06-11 ENCOUNTER — HOSPITAL ENCOUNTER (OUTPATIENT)
Dept: ULTRASOUND IMAGING | Age: 66
Discharge: HOME | End: 2021-06-11
Attending: SPECIALIST
Payer: MEDICARE

## 2021-06-11 DIAGNOSIS — E05.10 TOXIC THYROID NODULE: ICD-10-CM

## 2021-06-11 PROCEDURE — 76536 US EXAM OF HEAD AND NECK: CPT

## 2021-06-17 ENCOUNTER — TRANSCRIBE ORDER (OUTPATIENT)
Dept: SCHEDULING | Age: 66
End: 2021-06-17

## 2021-06-17 DIAGNOSIS — E04.1 NONTOXIC UNINODULAR GOITER: Primary | ICD-10-CM

## 2021-06-18 ENCOUNTER — TRANSCRIBE ORDER (OUTPATIENT)
Dept: SCHEDULING | Age: 66
End: 2021-06-18

## 2021-06-18 DIAGNOSIS — E04.1 NONTOXIC UNINODULAR GOITER: Primary | ICD-10-CM

## 2021-06-25 ENCOUNTER — HOSPITAL ENCOUNTER (OUTPATIENT)
Dept: ULTRASOUND IMAGING | Age: 66
Discharge: HOME | End: 2021-06-25
Attending: SPECIALIST
Payer: MEDICARE

## 2021-06-25 DIAGNOSIS — E04.1 NONTOXIC UNINODULAR GOITER: ICD-10-CM

## 2021-06-25 PROCEDURE — 88173 CYTOPATH EVAL FNA REPORT: CPT

## 2021-06-25 PROCEDURE — 10006 FNA BX W/US GDN EA ADDL: CPT

## 2021-06-25 PROCEDURE — 88172 CYTP DX EVAL FNA 1ST EA SITE: CPT

## 2021-06-25 PROCEDURE — 10005 FNA BX W/US GDN 1ST LES: CPT

## 2021-06-25 RX ORDER — LIDOCAINE HYDROCHLORIDE 10 MG/ML
10 INJECTION, SOLUTION EPIDURAL; INFILTRATION; INTRACAUDAL; PERINEURAL
Status: COMPLETED | OUTPATIENT
Start: 2021-06-25 | End: 2021-06-25

## 2021-06-25 RX ADMIN — LIDOCAINE HYDROCHLORIDE 5 ML: 10 INJECTION, SOLUTION EPIDURAL; INFILTRATION; INTRACAUDAL; PERINEURAL at 10:44

## 2021-07-15 RX ORDER — METFORMIN HYDROCHLORIDE 500 MG/1
TABLET, EXTENDED RELEASE ORAL
Qty: 180 TABLET | Refills: 0 | Status: SHIPPED | OUTPATIENT
Start: 2021-07-15 | End: 2021-09-03

## 2021-08-11 RX ORDER — PRAVASTATIN SODIUM 20 MG/1
TABLET ORAL
Qty: 90 TABLET | Refills: 1 | Status: SHIPPED | OUTPATIENT
Start: 2021-08-11 | End: 2022-02-05

## 2021-08-26 NOTE — MR AVS SNAPSHOT
Visit Information Date & Time Provider Department Dept. Phone Encounter #  
 12/13/2017  2:45 PM Kyle Aguilar, [address] [phone] Follow-up Instructions Return in about 4 months (around 4/13/2018). Upcoming Health Maintenance Date Due DTaP/Tdap/Td series (1 - Tdap) 3/11/1975 LIPID PANEL Q1 5/11/2018 FOOT EXAM Q1 5/17/2018 EYE EXAM RETINAL OR DILATED Q1 6/8/2018 HEMOGLOBIN A1C Q6M 6/11/2018 PAP AKA CERVICAL CYTOLOGY 10/12/2018 MICROALBUMIN Q1 12/11/2018 COLONOSCOPY 8/11/2026 Allergies as of 12/13/2017  Review Complete On: 12/13/2017 By: Kyle Aguilar MD  
 No Known Allergies Current Immunizations  Never Reviewed Name Date Influenza Vaccine 11/10/2017, 10/10/2016, 9/22/2014 Influenza Vaccine Split 9/12/2012 Pneumococcal Polysaccharide (PPSV-23) 5/17/2017 Tdap 3/10/2014 Zoster Vaccine, Live 4/5/2015 Not reviewed this visit You Were Diagnosed With   
  
 Codes Comments Essential hypertension    -  Primary ICD-10-CM: I10 
ICD-9-CM: 401.9 Reactive depression     ICD-10-CM: F32.9 ICD-9-CM: 300.4 Hypercholesterolemia     ICD-10-CM: E78.00 ICD-9-CM: 272.0 Prediabetes     ICD-10-CM: R73.03 
ICD-9-CM: 790.29 Obesity (BMI 35.0-39.9 without comorbidity)     ICD-10-CM: E50.6 ICD-9-CM: 278.00 Vitals BP Pulse Temp Resp Height(growth percentile) Weight(growth percentile)  
 119/69 (BP 1 Location: Left arm, BP Patient Position: Sitting) 66 97.6 °F (36.4 °C) (Oral) 16 5' 9\" (1.753 m) 263 lb (119.3 kg) SpO2 BMI OB Status Smoking Status 96% 38.84 kg/m2 Postmenopausal Never Smoker Vitals History BMI and BSA Data Body Mass Index Body Surface Area  
 38.84 kg/m 2 2.41 m 2 Preferred Pharmacy Pharmacy Name Phone [phone] [facility] [phone] - resolved  - monitor BMP Your Updated Medication List  
  
   
This list is accurate as of: 12/13/17  2:54 PM.  Always use your most recent med list.  
  
  
  
  
 aspirin delayed-release 81 mg tablet Commonly known as:  PQBBX-18 Take 1 Tab by mouth daily. Blood-Glucose Meter monitoring kit Commonly known as:  FREESTYLE LITE METER Daily  
  
 glucose blood VI test strips strip Commonly known as:  FREESTYLE LITE STRIPS  
2 per week  
  
 lisinopril-hydroCHLOROthiazide 10-12.5 mg per tablet Commonly known as:  Donnamarie Parrot Take 1 Tab by mouth daily. metFORMIN  mg tablet Commonly known as:  GLUCOPHAGE XR Take 2 Tabs by mouth daily (with dinner). metoprolol tartrate 50 mg tablet Commonly known as:  LOPRESSOR Take 1 Tab by mouth two (2) times a day. pravastatin 20 mg tablet Commonly known as:  PRAVACHOL Take 1 Tab by mouth daily. sertraline 50 mg tablet Commonly known as:  ZOLOFT Take 1 Tab by mouth daily. Follow-up Instructions Return in about 4 months (around 4/13/2018). To-Do List   
 12/14/2017 Lab:  HEMOGLOBIN A1C WITH EAG   
  
 12/14/2017 Lab:  LIPID PANEL   
  
 12/14/2017 Lab:  METABOLIC PANEL, BASIC   
  
 12/14/2017 Lab:  TSH 3RD GENERATION Introducing [facility]! Dear Alexandrea: Thank you for requesting a [employer] account. Our records indicate that you already have an active [employer] account. You can access your account anytime at [email or web address] Did you know that you can access your hospital and ER discharge instructions at any time in [employer]? You can also review all of your test results from your hospital stay or ER visit. Additional Information If you have questions, please visit the Frequently Asked Questions section of the [employer] website at [email or web address] Remember, [employer] is NOT to be used for urgent needs. For medical emergencies, dial 911. Now available from your iPhone and Android! Please provide this summary of care documentation to your next provider. Your primary care clinician is listed as Kali Baltazar. If you have any questions after today's visit, please call [phone]. - continue with Eliquis - continue with Eliquis - replete potassium  - monitor BMP - repleting potassium  - monitor BMP

## 2021-09-02 ENCOUNTER — TELEPHONE (OUTPATIENT)
Dept: INTERNAL MEDICINE CLINIC | Age: 66
End: 2021-09-02

## 2021-09-02 NOTE — TELEPHONE ENCOUNTER
Patient states left side bottom gum pain.   Cannot get into dentist until after weekend, can something be prescribed for the pain until patient can get to dentist.      Pt phone [phone]        Pt states uses:    [facility] #[id number] [phone], [address]

## 2021-09-03 RX ORDER — METOPROLOL TARTRATE 50 MG/1
TABLET ORAL
Qty: 180 TABLET | Refills: 0 | Status: SHIPPED | OUTPATIENT
Start: 2021-09-03 | End: 2021-11-24

## 2021-09-03 RX ORDER — METFORMIN HYDROCHLORIDE 500 MG/1
TABLET, EXTENDED RELEASE ORAL
Qty: 180 TABLET | Refills: 0 | Status: SHIPPED | OUTPATIENT
Start: 2021-09-03 | End: 2021-12-05

## 2021-09-03 NOTE — TELEPHONE ENCOUNTER
Sienna Nelson MD  You 16 hours ago (4:13 PM)   Shonna Fontaine  This really would be coming from a dentist     I dont have any specific gum pain medication but she can use Tylenol and ibuprofen over the counter and call her dentist office for any further instruction    Message text

## 2021-09-24 NOTE — PROGRESS NOTES
HISTORY OF PRESENT ILLNESS  Alexandrea Ambrose is a 78 y.o. female. HPI     Last here 5/26/21. Pt is here to f/u on chronic conditions.      BP today is controlled  BP at other doctor's offices per pt 150/90, 144/80  /80 day before saw Dr. Alice Eubanks on  metoprolol 50mg BID   Endo stopped lisinopril-hctz 10-12.5 and changed her to lisinopril 20 mg-- took these meds today     She is diabetic    BS   Continues on metformin 2 tabs 500mg  BID  Continues on on trulicity 1.5 mg  weekly  Recall increased metformin caused diarrhea and ozempic was too expensive     Wt is 244 lbs, down 10 lbs x lov   Discussed diet and w/l      Reviewed labs  Reviewed US thyroid 6/11/21:  Multinodular goiter. Correlate with clinical and endocrine parameters, and any  prior imaging, to determine the need for further testing. Previously labs showed that she is a carrier for hemochromatosis     She is now following with  Dr. Kayce Ruiz ([facility])  for hyperthyroidism   Last there 9/21: she had lab work done and started tapazol 5 mg twice weekly, continues lisinopril 20mg, A1c 5.6 per pt      Continues on pravachol 20mg daily for cholesterol       No longer on zoloft 50mg after a rash, doing well - no issues with depression/anxiety     Reviewed Echo Stress test 1/18/19, will monitor     Pt lives with her     Pt is functionally independent   No safety equip  Drinks one glass of wine every night or every other night      No memory concerns   Knows the month, date, year, location   Can recall 3/3 objects        ACP not on file. SDMs are her  (Fernando Evangelista) and son Diandra Steven).   Provided information in the past.     PREVENTIVE:    Colonoscopy: 8/11/16, Dr. Rosita Acosta, repeat 10 years  Pap: Dr. Brandon Montanez abn, scheduled 10/1/21  AAA: no fmhx  DEXA: 6/30/20 nl  Tdap: 3/31/2014    Pneumovax: 5/17/2017  [id number]: 07/24/19  Zostavax: 4/05/2015  Shingrix: ordered 08/30/18, reminded   Flu shot: 20, will get today 21  Foot exam 21  Microalbumin:  kapros   A1c: 2 3/20 6.7  6.9  6.6  kapros 6.4  6.0 kapros  5.6 kappros per pt  Eye exam: Dr. Jones 20, needs to reschedule   Hep C screen: , negative  Lipids:   LDL 54 kapros  EK/18  Covid: both (moderna)    Patient Active Problem List    Diagnosis Date Noted    Controlled type 2 diabetes mellitus with complication, without long-term current use of insulin (HCC)     Severe obesity (BMI 35.0-39. 9) with comorbidity ([facility] 2018    Prediabetes 2016    Hypercholesterolemia     Hypertension     Depression      Current Outpatient Medications   Medication Sig Dispense Refill    metoprolol tartrate (LOPRESSOR) 50 mg tablet TAKE 1 TABLET BY MOUTH TWICE A  Tablet 0    metFORMIN ER (GLUCOPHAGE XR) 500 mg tablet TAKE 1 TABLET BY MOUTH TWICE A DAY WITH MEALS 180 Tablet 0    pravastatin (PRAVACHOL) 20 mg tablet TAKE 1 TABLET BY MOUTH EVERY DAY 90 Tablet 1    cholecalciferol (VITAMIN D3) (2,000 UNITS /50 MCG) cap capsule Take 4,000 Units by mouth daily.  lisinopriL (PRINIVIL, ZESTRIL) 20 mg tablet       dulaglutide (Trulicity) 1.5 CQ/2.4 mL sub-q pen 1.5 mg by SubCUTAneous route every seven (7) days.  Blood-Glucose Meter (OneTouch Verio Reflect Meter) misc by Does Not Apply route.  glucose blood VI test strips (OneTouch Ultra Blue Test Strip) strip USE TO CHECK BLOOD SUGAR ONCE DAILY. DX E11.9 (Patient not taking: Reported on 2021) 100 Strip 11    lancets (OneTouch UltraSoft Lancets) misc Use to check blood sugar 4 times daily. DX E11.9 (Patient not taking: Reported on 2021) 1 Package 11    aspirin delayed-release (ASPIR-81) 81 mg tablet Take 1 Tab by mouth daily.  90 Tab 3     Past Surgical History:   Procedure Laterality Date    COLONOSCOPY N/A 2016    COLONOSCOPY performed by Servando West MD at [facility] ENDOSCOPY    HX BREAST BIOPSY Left Years ago Benign surgical biopsy    US GUIDE ASP BREAST LT CYST W NDL Left Years ago    Benign      Lab Results   Component Value Date/Time    WBC 8.6 09/29/2020 08:39 AM    HGB 14.5 09/29/2020 08:39 AM    HCT 43.6 09/29/2020 08:39 AM    PLATELET 277 34/22/7969 08:39 AM    MCV 90 09/29/2020 08:39 AM     Lab Results   Component Value Date/Time    Cholesterol, total 132 09/29/2020 08:39 AM    HDL Cholesterol 41 09/29/2020 08:39 AM    LDL, calculated 61 09/29/2020 08:39 AM    LDL, calculated 46 03/17/2020 11:08 AM    LDL-C, External 54 05/03/2021 12:00 AM    Triglyceride 178 (H) 09/29/2020 08:39 AM    CHOL/HDL Ratio 4.3 08/16/2010 01:49 PM     Lab Results   Component Value Date/Time    GFR est non-AA 71 02/24/2021 12:48 PM    GFR est AA 81 02/24/2021 12:48 PM    Creatinine 0.86 02/24/2021 12:48 PM    BUN 14 02/24/2021 12:48 PM    Sodium 138 02/24/2021 12:48 PM    Potassium 5.1 02/24/2021 12:48 PM    Chloride 101 02/24/2021 12:48 PM    CO2 24 02/24/2021 12:48 PM    PTH, Intact 46 02/24/2021 12:48 PM        Review of Systems   Respiratory: Negative for shortness of breath. Cardiovascular: Negative for chest pain. Physical Exam  Constitutional:       General: She is not in acute distress. Appearance: Normal appearance. She is not ill-appearing, toxic-appearing or diaphoretic. HENT:      Head: Normocephalic and atraumatic. Right Ear: External ear normal.      Left Ear: External ear normal.   Eyes:      General:         Right eye: No discharge. Left eye: No discharge. Conjunctiva/sclera: Conjunctivae normal.      Pupils: Pupils are equal, round, and reactive to light. Cardiovascular:      Rate and Rhythm: Normal rate and regular rhythm. Heart sounds: No murmur heard. No friction rub. No gallop. Pulmonary:      Effort: No respiratory distress. Breath sounds: Normal breath sounds. No wheezing or rales. Chest:      Chest wall: No tenderness.    Musculoskeletal:         General: Normal range of motion. Cervical back: Normal range of motion and neck supple. Right lower leg: No edema. Left lower leg: No edema. Skin:     General: Skin is warm and dry. Neurological:      Mental Status: She is alert and oriented to person, place, and time. Mental status is at baseline. Coordination: Coordination normal.      Gait: Gait normal.   Psychiatric:         Mood and Affect: Mood normal.         Behavior: Behavior normal.         ASSESSMENT and PLAN    ICD-10-CM ICD-9-CM    1. Controlled type 2 diabetes mellitus with complication, without long-term current use of insulin ([facility] [address]       Has been well controlled on Trulicity weekly and Metformin 1000 mg twice daily    Continue    Monitor A1c     E11.8 250.90    2. Severe obesity (BMI 35.0-39. 9) with comorbidity ([facility] [address]       Continue work on portions Trulicity to assist with weight loss     E66.01 278.01    3. Essential hypertension       Controlled on metoprolol twice daily    And lisinopril     I10 401.9    4. Hypercholesterolemia       Controlled on Pravachol monitor lipids     E78.00 272.0    5. Reactive depression         Take any medication doing well F32.9 300.4       6 hyperthyroidism on Tapazole continue    Depression screen reviewed and negative     Scribed by Franck Molina of [address] [place] [address] [address], as dictated by Dr. Raghu Lobato. Current diagnosis and concerns discussed with pt at length. Pt understands risks and benefits or current treatment plan and medications, and accepts the treatment and medication with any possible risks. Pt asks appropriate questions, which were answered. Pt was instructed to call with any concerns or problems. I have reviewed the note documented by the scribe. The services provided are my own.   The documentation is accurate

## 2021-09-27 ENCOUNTER — OFFICE VISIT (OUTPATIENT)
Dept: INTERNAL MEDICINE CLINIC | Age: 66
End: 2021-09-27
Payer: MEDICARE

## 2021-09-27 VITALS
HEART RATE: 71 BPM | SYSTOLIC BLOOD PRESSURE: 128 MMHG | HEIGHT: 69 IN | TEMPERATURE: 97.2 F | BODY MASS INDEX: 36.26 KG/M2 | DIASTOLIC BLOOD PRESSURE: 85 MMHG | RESPIRATION RATE: 16 BRPM | WEIGHT: 244.8 LBS | OXYGEN SATURATION: 98 %

## 2021-09-27 DIAGNOSIS — E05.90 HYPERTHYROIDISM: ICD-10-CM

## 2021-09-27 DIAGNOSIS — Z23 NEEDS FLU SHOT: ICD-10-CM

## 2021-09-27 DIAGNOSIS — Z00.00 MEDICARE ANNUAL WELLNESS VISIT, SUBSEQUENT: ICD-10-CM

## 2021-09-27 DIAGNOSIS — E66.01 SEVERE OBESITY (BMI 35.0-39.9) WITH COMORBIDITY (HCC): ICD-10-CM

## 2021-09-27 DIAGNOSIS — E78.00 HYPERCHOLESTEROLEMIA: ICD-10-CM

## 2021-09-27 DIAGNOSIS — F32.9 REACTIVE DEPRESSION: ICD-10-CM

## 2021-09-27 DIAGNOSIS — I10 ESSENTIAL HYPERTENSION: ICD-10-CM

## 2021-09-27 DIAGNOSIS — E11.8 CONTROLLED TYPE 2 DIABETES MELLITUS WITH COMPLICATION, WITHOUT LONG-TERM CURRENT USE OF INSULIN (HCC): Primary | ICD-10-CM

## 2021-09-27 PROCEDURE — G8754 DIAS BP LESS 90: HCPCS | Performed by: INTERNAL MEDICINE

## 2021-09-27 PROCEDURE — G8399 PT W/DXA RESULTS DOCUMENT: HCPCS | Performed by: INTERNAL MEDICINE

## 2021-09-27 PROCEDURE — 99213 OFFICE O/P EST LOW 20 MIN: CPT | Performed by: INTERNAL MEDICINE

## 2021-09-27 PROCEDURE — 90694 VACC AIIV4 NO PRSRV 0.5ML IM: CPT | Performed by: INTERNAL MEDICINE

## 2021-09-27 PROCEDURE — G8752 SYS BP LESS 140: HCPCS | Performed by: INTERNAL MEDICINE

## 2021-09-27 PROCEDURE — 1090F PRES/ABSN URINE INCON ASSESS: CPT | Performed by: INTERNAL MEDICINE

## 2021-09-27 PROCEDURE — G9717 DOC PT DX DEP/BP F/U NT REQ: HCPCS | Performed by: INTERNAL MEDICINE

## 2021-09-27 PROCEDURE — G8417 CALC BMI ABV UP PARAM F/U: HCPCS | Performed by: INTERNAL MEDICINE

## 2021-09-27 PROCEDURE — G0008 ADMIN INFLUENZA VIRUS VAC: HCPCS | Performed by: INTERNAL MEDICINE

## 2021-09-27 PROCEDURE — 1101F PT FALLS ASSESS-DOCD LE1/YR: CPT | Performed by: INTERNAL MEDICINE

## 2021-09-27 PROCEDURE — 3044F HG A1C LEVEL LT 7.0%: CPT | Performed by: INTERNAL MEDICINE

## 2021-09-27 PROCEDURE — G0439 PPPS, SUBSEQ VISIT: HCPCS | Performed by: INTERNAL MEDICINE

## 2021-09-27 PROCEDURE — 3017F COLORECTAL CA SCREEN DOC REV: CPT | Performed by: INTERNAL MEDICINE

## 2021-09-27 PROCEDURE — G8536 NO DOC ELDER MAL SCRN: HCPCS | Performed by: INTERNAL MEDICINE

## 2021-09-27 PROCEDURE — G8427 DOCREV CUR MEDS BY ELIG CLIN: HCPCS | Performed by: INTERNAL MEDICINE

## 2021-09-27 PROCEDURE — 2022F DILAT RTA XM EVC RTNOPTHY: CPT | Performed by: INTERNAL MEDICINE

## 2021-09-27 PROCEDURE — G9899 SCRN MAM PERF RSLTS DOC: HCPCS | Performed by: INTERNAL MEDICINE

## 2021-09-27 RX ORDER — DULAGLUTIDE 1.5 MG/.5ML
1.5 INJECTION, SOLUTION SUBCUTANEOUS
Qty: 1 EACH | Refills: 0 | Status: SHIPPED | COMMUNITY
Start: 2021-09-27 | End: 2022-01-31

## 2021-09-27 RX ORDER — METHIMAZOLE 5 MG/1
5 TABLET ORAL
COMMUNITY

## 2021-09-27 NOTE — PATIENT INSTRUCTIONS

## 2021-09-27 NOTE — PROGRESS NOTES
This is the Subsequent Medicare Annual Wellness Exam, performed 12 months or more after the Initial AWV or the last Subsequent AWV    I have reviewed the patient's medical history in detail and updated the computerized patient record. Assessment/Plan   Education and counseling provided:  Are appropriate based on today's review and evaluation    1. Controlled type 2 diabetes mellitus with complication, without long-term current use of insulin ([facility] [address]  2. Severe obesity (BMI 35.0-39. 9) with comorbidity ([facility] [address]  3. Essential hypertension  4. Hypercholesterolemia  5. Reactive depression  6. Medicare annual wellness visit, subsequent       Depression Risk Factor Screening     3 most recent PHQ Screens 9/27/2021   Little interest or pleasure in doing things Not at all   Feeling down, depressed, irritable, or hopeless Not at all   Total Score PHQ 2 0       Alcohol Risk Screen    Do you average more than 1 drink per night or more than 7 drinks a week:  No    On any one occasion in the past three months have you have had more than 3 drinks containing alcohol:  No    Wine every other day one glass    Functional Ability and Level of Safety    Hearing: Hearing is good. Activities of Daily Living: The home contains: no safety equipment. Patient does total self care      Ambulation: with no difficulty     Fall Risk:  Fall Risk Assessment, last 12 mths 9/27/2021   Able to walk? Yes   Fall in past 12 months? 0   Do you feel unsteady?  0      Abuse Screen:  Patient is not abused   lives w  safe    Cognitive Screening    Has your family/caregiver stated any concerns about your memory: no     Cognitive Screening: Normal - Mini Cog Test     No memory concerns   Pt knows the month, day and year   Can recall 3/3 objects     Health Maintenance Due     Health Maintenance Due   Topic Date Due    Shingrix Vaccine Age 49> (1 of 2) Never done    Eye Exam Retinal or Dilated  02/04/2021    Flu Vaccine (1) 09/01/2021 Patient Care Team   Patient Care Team:  Pamela Lopez MD as PCP - General (Internal Medicine)  Pamela Lopez MD as PCP - [facility]  Varinder Summers RN as Benefits Care Manager  Tamika Varela MD (Ophthalmology)  Kvng Triana MD (Obstetrics & Gynecology)  Camila Higgins MD (Gastroenterology)  Dr. Delano Claude ([facility] Skiff)  Timothy Mariscal MD (Endocrinology)    History     Patient Active Problem List   Diagnosis Code    Hypercholesterolemia E78.00    Hypertension I10    Depression F32.9    Severe obesity (BMI 35.0-39. 9) with comorbidity ([facility] [address] E66.01    Controlled type 2 diabetes mellitus with complication, without long-term current use of insulin (HCC) E11.8     Past Medical History:   Diagnosis Date    Depression     Glucose intolerance (impaired glucose tolerance)     Hemochromatosis     Hypercholesterolemia     Hypertension     Menopause     LMP-46years old      Past Surgical History:   Procedure Laterality Date    COLONOSCOPY N/A 8/11/2016    COL[facility] Gay Ron [facility] at [facility] END[facility]COPY    HX BREAST BIOPSY Left Years ago    Benign surgical biopsy    US GUIDE ASP BREAST LT CYST W NDL Left Years ago    Benign     Current Outpatient Medications   Medication Sig Dispense Refill    metoprolol tartrate (LOPRESSOR) 50 mg tablet TAKE 1 TABLET BY MOUTH TWICE A  Tablet 0    metFORMIN ER (GLUCOPHAGE XR) 500 mg tablet TAKE 1 TABLET BY MOUTH TWICE A DAY WITH MEALS 180 Tablet 0    pravastatin (PRAVACHOL) 20 mg tablet TAKE 1 TABLET BY MOUTH EVERY DAY 90 Tablet 1    cholecalciferol (VITAMIN D3) (2,000 UNITS /50 MCG) cap capsule Take 4,000 Units by mouth daily.  lisinopriL (PRINIVIL, ZESTRIL) 20 mg tablet       dulaglutide (Trulicity) 1.5 IZ/3.7 mL sub-q pen 1.5 mg by SubCUTAneous route every seven (7) days.  Blood-Glucose Meter (OneTouch Verio Reflect Meter) misc by Does Not Apply route.       glucose blood VI test strips (OneTouch Ultra Blue Test Strip) strip USE TO CHECK BLOOD SUGAR ONCE DAILY. DX E11.9 (Patient not taking: Reported on 2021) 100 Strip 11    lancets (OneTouch UltraSoft Lancets) misc Use to check blood sugar 4 times daily. DX E11.9 (Patient not taking: Reported on 2021) 1 Package 11    aspirin delayed-release (ASPIR-81) 81 mg tablet Take 1 Tab by mouth daily. 80 Tab 3     No Known Allergies    Family History   Problem Relation Age of Onset    Heart Disease Mother     Hypertension Mother     Heart Disease Father     Cancer Father         skin    Hypertension Father     Diabetes Brother     Stroke Maternal Grandmother     Breast Cancer Maternal Aunt         50's     Social History     Tobacco Use    Smoking status: Never Smoker    Smokeless tobacco: Never Used   Substance Use Topics    Alcohol use: Yes     Comment: social       ACP not on file. SDMs are her  (Fernando Evangelista) and son Luis Enrique Pandey. Provided information in the past. Can fill out and bring in        Colonoscopy: 16, Dr. Mandy Valadez, repeat 10 years  Pap: Dr. Triston Holman, 10/12/20 q2 years  Mammogram: 21 abnl, scheduled 10/1/21  AAA: no fmhx  DEXA: 20 nl q 2years    Tdap: 3/31/2014    Pneumovax: 2017  [id number]: 19  Zostavax: 2015  Shingrix: ordered reminded   Flu shot: 20, will get today 21      A1c:   5.6 kappros per pt q3 months    Eye exam: Dr. Jones 20, needs to reschedule     Hep C screen: , negative  Lipids:   LDL 54 kapros annual   EK/18  Covid: both (moderna)    Medication reconciliation completed by MA and reviewed by me. Medical/surgical/social/family history reviewed and updated by me. Patient provided AVS and preventative screening table. Patient verbalized understanding of all information discussed.         Erika Vilchis MD

## 2021-10-01 ENCOUNTER — HOSPITAL ENCOUNTER (OUTPATIENT)
Dept: MAMMOGRAPHY | Age: 66
Discharge: HOME | End: 2021-10-01
Attending: INTERNAL MEDICINE
Payer: MEDICARE

## 2021-10-01 DIAGNOSIS — R92.8 ABNORMAL MAMMOGRAM: ICD-10-CM

## 2021-10-01 PROCEDURE — 77066 DX MAMMO INCL CAD BI: CPT

## 2021-10-25 DIAGNOSIS — E11.9 TYPE 2 DIABETES MELLITUS WITHOUT COMPLICATION, WITHOUT LONG-TERM CURRENT USE OF INSULIN (HCC): ICD-10-CM

## 2021-10-26 RX ORDER — LANCETS
EACH MISCELLANEOUS
Qty: 1 EACH | Refills: 11 | Status: SHIPPED | OUTPATIENT
Start: 2021-10-26

## 2021-11-24 RX ORDER — METOPROLOL TARTRATE 50 MG/1
TABLET ORAL
Qty: 180 TABLET | Refills: 0 | Status: SHIPPED | OUTPATIENT
Start: 2021-11-24 | End: 2022-02-23

## 2021-12-05 RX ORDER — METFORMIN HYDROCHLORIDE 500 MG/1
TABLET, EXTENDED RELEASE ORAL
Qty: 180 TABLET | Refills: 0 | Status: SHIPPED | OUTPATIENT
Start: 2021-12-05

## 2021-12-30 LAB
CREATININE, EXTERNAL: 0.82
HBA1C MFR BLD HPLC: 5.4 %
LDL-C, EXTERNAL: 71

## 2022-01-26 NOTE — PROGRESS NOTES
HISTORY OF PRESENT ILLNESS  Alexandrea Abram Menendez is a 78 y.o. female. HPI     Ольга Childress 9/27/21. Pt is here for preop. Brought in paperwork     She will be getting cataract surgery Wednesday 2/02/22    Pt denies cp, sob, palpitations, orthopnea, claudication, PND, and new swelling in legs  Pt can sleep laying flat  Pt can walk up a set of stairs  Pt can walk around the mall   Pt can vacuum and do laundry    Functional mets >>4    Will get EKG        Has history of hypertension  BP today is 136/84  BP at home 130s/70s  Continues on  metoprolol 50mg BID   Endo stopped lisinopril-hctz 10-12.5 and changed her to lisinopril 20 mg  Discussed does not have to take asa since no personal hx of stroke     She is diabetic    110 107, sometimes under 100  Continues on metformin 2 tabs 500mg BID  No issues with diarrhea currently  No longer on trulicity 1.5 mg  weekly-- this was expensive  Recall increased metformin caused diarrhea and ozempic was too expensive  She wonders about stopping metformin, discussed this might be possible with wt/l under 200 lbs     Wt today is 243 lbs, down 1 lb x lov  Discussed diet and w/l       Reviewed labs      Previously labs showed that she is a carrier for hemochromatosis     She is now following with  Dr. Claudean Quin ([facility])  for hyperthyroidism was last there in December 2021  Reviewed recent labs 12/30/21: kidneys nl, liver nl, A1c 5.4 LDL 71 TSH 0.025  Continues Tapazole 5mg now 4 times per week    Continues on pravachol 20mg daily for cholesterol       No longer on zoloft 50mg after a rash, doing well - no issues with depression/anxiety      Reviewed Echo Stress test 1/18/19, will monitor        ACP not on file. SDMs are her  (Fernando Montana) and son Jerica Layton).   Provided information in the past.     PREVENTIVE:    Colonoscopy: 8/11/16, Dr. Lucio Brandon, repeat 10 years  Pap: Dr. Phuong Garcia scheduled 4/01/22  AAA: no fmhx  DEXA: 6/30/20 nl  Tdap: 3/31/2014    Pneumovax: 2017  [id number]: 19  Zostavax: 2015  Shingrix: ordered reminded   Flu shot: 21  Foot exam 21  Microalbumin:  kapros   A1c: 2 3/20 6.7  6.9  6.6  kapros 6.4  6.0 kapros  5.6 kappros per pt  5.4 kapros  Eye exam: Dr. Jones 22 will be getting cataract surgery  Hep C screen: , negative  Lipids:  LDL 74 Kapros  EK/18  Covid: both + booster (moderna)    Patient Active Problem List    Diagnosis Date Noted    Hyperthyroidism 2021    Controlled type 2 diabetes mellitus with complication, without long-term current use of insulin (HCC)     Severe obesity (BMI 35.0-39. 9) with comorbidity ([address] 2018    Hypercholesterolemia     Hypertension     Depression      Current Outpatient Medications   Medication Sig Dispense Refill    metFORMIN ER (GLUCOPHAGE XR) 500 mg tablet TAKE 1 TABLET BY MOUTH TWICE A DAY WITH MEALS (Patient taking differently: 2 tabs BID) 180 Tablet 0    metoprolol tartrate (LOPRESSOR) 50 mg tablet TAKE 1 TABLET BY MOUTH TWICE A  Tablet 0    lancets (OneTouch UltraSoft Lancets) misc USE TO CHECK BLOOD SUGAR ONCE DAILY. DX E11.9 1 Each 11    methIMAzole (Tapazole) 5 mg tablet Take 5 mg by mouth. 1 tablet 4 times a week      pravastatin (PRAVACHOL) 20 mg tablet TAKE 1 TABLET BY MOUTH EVERY DAY 90 Tablet 1    lisinopriL (PRINIVIL, ZESTRIL) 20 mg tablet       Blood-Glucose Meter (OneTouch Verio Reflect Meter) misc by Does Not Apply route.  glucose blood VI test strips (OneTouch Ultra Blue Test Strip) strip USE TO CHECK BLOOD SUGAR ONCE DAILY. DX E11.9 100 Strip 11    aspirin delayed-release (ASPIR-81) 81 mg tablet Take 1 Tab by mouth daily. 90 Tab 3    cholecalciferol (VITAMIN D3) (2,000 UNITS /50 MCG) cap capsule Take 4,000 Units by mouth daily.        Past Surgical History:   Procedure Laterality Date    COLONOSCOPY N/A 2016    COLONOSCOPY performed by Willy Conti [facility] at [facility] ENDOSCOPY    HX BREAST BIOPSY Left Years ago    Benign surgical biopsy    US GUIDE ASP BREAST LT CYST W NDL Left Years ago    Benign      Lab Results   Component Value Date/Time    WBC 8.6 09/29/2020 08:39 AM    HGB 14.5 09/29/2020 08:39 AM    HCT 43.6 09/29/2020 08:39 AM    PLATELET 007 33/73/0671 08:39 AM    MCV 90 09/29/2020 08:39 AM     Lab Results   Component Value Date/Time    Cholesterol, total 132 09/29/2020 08:39 AM    HDL Cholesterol 41 09/29/2020 08:39 AM    LDL, calculated 61 09/29/2020 08:39 AM    LDL, calculated 46 03/17/2020 11:08 AM    LDL-C, External 54 05/03/2021 12:00 AM    Triglyceride 178 (H) 09/29/2020 08:39 AM    CHOL/HDL Ratio 4.3 08/16/2010 01:49 PM     Lab Results   Component Value Date/Time    GFR est non-AA 71 02/24/2021 12:48 PM    GFR est AA 81 02/24/2021 12:48 PM    Creatinine 0.86 02/24/2021 12:48 PM    BUN 14 02/24/2021 12:48 PM    Sodium 138 02/24/2021 12:48 PM    Potassium 5.1 02/24/2021 12:48 PM    Chloride 101 02/24/2021 12:48 PM    CO2 24 02/24/2021 12:48 PM    PTH, Intact 46 02/24/2021 12:48 PM        Review of Systems   Respiratory: Negative for shortness of breath. Cardiovascular: Negative for chest pain. Physical Exam  Constitutional:       General: She is not in acute distress. Appearance: Normal appearance. She is not ill-appearing, toxic-appearing or diaphoretic. HENT:      Head: Normocephalic and atraumatic. Right Ear: External ear normal.      Left Ear: External ear normal.   Eyes:      General:         Right eye: No discharge. Left eye: No discharge. Conjunctiva/sclera: Conjunctivae normal.      Pupils: Pupils are equal, round, and reactive to light. Neck:      Vascular: No carotid bruit. Cardiovascular:      Rate and Rhythm: Normal rate and regular rhythm. Heart sounds: No murmur heard. No friction rub. No gallop. Pulmonary:      Effort: No respiratory distress. Breath sounds: Normal breath sounds.  No wheezing or rales. Chest:      Chest wall: No tenderness. Abdominal:      General: Abdomen is flat. There is no distension. Palpations: Abdomen is soft. There is no mass. Tenderness: There is no abdominal tenderness. There is no guarding or rebound. Hernia: No hernia is present. Musculoskeletal:         General: Normal range of motion. Cervical back: Normal range of motion and neck supple. Right lower leg: Edema present. Left lower leg: Edema present. Comments: Trace edema   Skin:     General: Skin is warm and dry. Neurological:      Mental Status: She is alert and oriented to person, place, and time. Mental status is at baseline. Coordination: Coordination normal.      Gait: Gait normal.   Psychiatric:         Mood and Affect: Mood normal.         Behavior: Behavior normal.         ASSESSMENT and PLAN    ICD-10-CM ICD-9-CM    1. Preop cardiovascular exam       Patient is intermediate risk with a normal EKG with adequate functional METS no active signs or symptoms of CAD or CHF surgery is low risk for cataract she may proceed with surgery without additional intervention   Z01.810 V72.81    2. Severe obesity (BMI 35.0-39. 9) with comorbidity ([facility] [address]       Work on portions no longer on Trulicity     Q70.33 947.15    3. Controlled type 2 diabetes mellitus with complication, without long-term current use of insulin ([address]       Home readings and A1c's have been good continue Metformin 500 mg 4 of these tablets per day no longer on Trulicity due to cost     E11.8 250.90    4. Hypercholesterolemia    Controlled on Pravachol     E78.00 272.0    5. Primary hypertension    Controlled on metoprolol and lisinopril     I10 401.9    6. Hyperthyroidism    On Tapazole 5 mg 4 times per week     E05.90 242.90    7. Reactive depression no longer requiring medication F32.9 300.4         Scribed by Ne Bundy [facility] as dictated by Dr. Moy Espinal.      Current diagnosis and concerns discussed with pt at length. Pt understands risks and benefits or current treatment plan and medications, and accepts the treatment and medication with any possible risks. Pt asks appropriate questions, which were answered. Pt was instructed to call with any concerns or problems. I have reviewed the note documented by the scribe. The services provided are my own.   The documentation is accurate

## 2022-01-31 ENCOUNTER — OFFICE VISIT (OUTPATIENT)
Dept: INTERNAL MEDICINE CLINIC | Age: 67
End: 2022-01-31
Payer: MEDICARE

## 2022-01-31 VITALS
HEART RATE: 61 BPM | SYSTOLIC BLOOD PRESSURE: 136 MMHG | HEIGHT: 69 IN | TEMPERATURE: 98.3 F | OXYGEN SATURATION: 98 % | RESPIRATION RATE: 16 BRPM | WEIGHT: 243.8 LBS | DIASTOLIC BLOOD PRESSURE: 84 MMHG | BODY MASS INDEX: 36.11 KG/M2

## 2022-01-31 DIAGNOSIS — I10 PRIMARY HYPERTENSION: ICD-10-CM

## 2022-01-31 DIAGNOSIS — E66.01 SEVERE OBESITY (BMI 35.0-39.9) WITH COMORBIDITY (HCC): ICD-10-CM

## 2022-01-31 DIAGNOSIS — F32.9 REACTIVE DEPRESSION: ICD-10-CM

## 2022-01-31 DIAGNOSIS — E05.90 HYPERTHYROIDISM: ICD-10-CM

## 2022-01-31 DIAGNOSIS — Z01.810 PREOP CARDIOVASCULAR EXAM: Primary | ICD-10-CM

## 2022-01-31 DIAGNOSIS — E78.00 HYPERCHOLESTEROLEMIA: ICD-10-CM

## 2022-01-31 DIAGNOSIS — E11.8 CONTROLLED TYPE 2 DIABETES MELLITUS WITH COMPLICATION, WITHOUT LONG-TERM CURRENT USE OF INSULIN (HCC): ICD-10-CM

## 2022-01-31 PROCEDURE — 93005 ELECTROCARDIOGRAM TRACING: CPT | Performed by: INTERNAL MEDICINE

## 2022-01-31 PROCEDURE — 1090F PRES/ABSN URINE INCON ASSESS: CPT | Performed by: INTERNAL MEDICINE

## 2022-01-31 PROCEDURE — G8399 PT W/DXA RESULTS DOCUMENT: HCPCS | Performed by: INTERNAL MEDICINE

## 2022-01-31 PROCEDURE — 2022F DILAT RTA XM EVC RTNOPTHY: CPT | Performed by: INTERNAL MEDICINE

## 2022-01-31 PROCEDURE — G8536 NO DOC ELDER MAL SCRN: HCPCS | Performed by: INTERNAL MEDICINE

## 2022-01-31 PROCEDURE — 3017F COLORECTAL CA SCREEN DOC REV: CPT | Performed by: INTERNAL MEDICINE

## 2022-01-31 PROCEDURE — G9899 SCRN MAM PERF RSLTS DOC: HCPCS | Performed by: INTERNAL MEDICINE

## 2022-01-31 PROCEDURE — G9717 DOC PT DX DEP/BP F/U NT REQ: HCPCS | Performed by: INTERNAL MEDICINE

## 2022-01-31 PROCEDURE — G8427 DOCREV CUR MEDS BY ELIG CLIN: HCPCS | Performed by: INTERNAL MEDICINE

## 2022-01-31 PROCEDURE — G0463 HOSPITAL OUTPT CLINIC VISIT: HCPCS | Performed by: INTERNAL MEDICINE

## 2022-01-31 PROCEDURE — G8754 DIAS BP LESS 90: HCPCS | Performed by: INTERNAL MEDICINE

## 2022-01-31 PROCEDURE — 3046F HEMOGLOBIN A1C LEVEL >9.0%: CPT | Performed by: INTERNAL MEDICINE

## 2022-01-31 PROCEDURE — G8417 CALC BMI ABV UP PARAM F/U: HCPCS | Performed by: INTERNAL MEDICINE

## 2022-01-31 PROCEDURE — 93010 ELECTROCARDIOGRAM REPORT: CPT | Performed by: INTERNAL MEDICINE

## 2022-01-31 PROCEDURE — 1101F PT FALLS ASSESS-DOCD LE1/YR: CPT | Performed by: INTERNAL MEDICINE

## 2022-01-31 PROCEDURE — 99214 OFFICE O/P EST MOD 30 MIN: CPT | Performed by: INTERNAL MEDICINE

## 2022-01-31 PROCEDURE — G8752 SYS BP LESS 140: HCPCS | Performed by: INTERNAL MEDICINE

## 2022-02-01 NOTE — TELEPHONE ENCOUNTER
- Albuterol - 1-2 puffs as needed for cough, shortness of breath     - Schedule fasting labs when feeling better        Called, spoke with Pt. Two pt identifiers confirmed. Pt given Dr. Celina Santoro instructions for pain. Pt informed to make sure she make her a dentist appt as this something Dr. Asmita Brown doesn't really treat. Pt stated she called her dentist and alternative the medications. Pt verbalized understanding of information discussed w/ no further questions at this time.

## 2022-02-05 RX ORDER — PRAVASTATIN SODIUM 20 MG/1
TABLET ORAL
Qty: 90 TABLET | Refills: 1 | Status: SHIPPED | OUTPATIENT
Start: 2022-02-05 | End: 2022-08-04

## 2022-02-23 RX ORDER — METOPROLOL TARTRATE 50 MG/1
TABLET ORAL
Qty: 180 TABLET | Refills: 0 | Status: SHIPPED | OUTPATIENT
Start: 2022-02-23 | End: 2022-05-23

## 2022-03-18 PROBLEM — E05.90 HYPERTHYROIDISM: Status: ACTIVE | Noted: 2021-09-27

## 2022-03-18 PROBLEM — E66.01 SEVERE OBESITY (BMI 35.0-39.9) WITH COMORBIDITY (HCC): Status: ACTIVE | Noted: 2018-04-17

## 2022-03-22 LAB — HBA1C MFR BLD HPLC: 5.8 %

## 2022-04-01 ENCOUNTER — HOSPITAL ENCOUNTER (OUTPATIENT)
Dept: MAMMOGRAPHY | Age: 67
Discharge: HOME | End: 2022-04-01
Attending: INTERNAL MEDICINE
Payer: MEDICARE

## 2022-04-01 DIAGNOSIS — R92.8 ABNORMAL MAMMOGRAM: ICD-10-CM

## 2022-04-01 PROCEDURE — 77061 BREAST TOMOSYNTHESIS UNI: CPT

## 2022-05-18 NOTE — PROGRESS NOTES
HISTORY OF PRESENT ILLNESS  Alexandrea Moreau is a 75 y.o. female. HPI     Last here 1/31/22.  Pt is here for routine care. She reports having  fall in grocery store parking lot, about a week ago trying to catch grocery cart that got away from her   She used her arms/hands to stop fall, states that jammed her hands  Pt mentions that she could move her hands afterwards she has full range of motion of all of her joints has some aches and pains  Her shoulder is bothering her but improved, discussed can use tylenol and can do PT if does not continue to improve  Discussed should be fine, will likely be sore for a while following this      Has history of hypertension  BP today is 164/102--repeat improved  Continues on  metoprolol 50mg BID and lisinopril 20 mg  Recall she was previously on lisinopril-hctz 10-12.5  She did not take her medications this morning     She is diabetic     generally well controlled  Continues on metformin 2 tabs 500mg BID  No issues with diarrhea currently  No longer on trulicity 1.5 mg  weekly-- this was expensive  Recall increased metformin caused diarrhea and ozempic was too expensive       Wt was 243 lbs lov  Discussed diet and w/l       Reviewed labs 3/24/22 blood counts nl , liver nl ,0.84 cr, 5.8 a1c, thyroid nl, LDL 67  Will get urine micro today  She will get labs with fran     Previously labs showed that she is a carrier for hemochromatosis     She is now following with  Dr. Narinder Pimentel ([facility])  for hyperthyroidism   Reviewed note 3/30/22: /76, continue metformin and B12 1000 mcg daily  Continues Tapazole 5mg  4 times per week     Continues on pravachol 20mg daily for cholesterol       No longer on zoloft 50mg after a rash, doing well - no issues with depression/anxiety      Reviewed Echo Stress test 1/18/19, will monitor     Reviewed mammo 4/01/22: negative       ACP not on file. SDMs are her  (Fernando Evangelista) and son Piero Ocasio).   Provided information in the past.     PREVENTIVE:    Colonoscopy: 16, Dr. Collette Sale, repeat 10 years  Pap: Dr. Sofia Machado negative  AAA: no fmhx  DEXA: 20 nl  Tdap: 3/31/2014    Pneumovax: 2017  Dvvqgnq90: 19  Zostavax: 2015  Shingrix: reminded she plans on getting at drugstore  Flu shot: 21  Foot exam: 22  Microalbumin:  kapros --today  A1c:  kappros per pt  5.4 kapros 3/22 5.8 per kapros  Eye exam: Vertis Aloe 3/29/22, had cataract surgery   Hep C screen: , negative  Lipids: 3/22 LDL 67 per kapros  EK/18  Covid: both + booster (moderna)    Patient Active Problem List    Diagnosis Date Noted    Hyperthyroidism 2021    Controlled type 2 diabetes mellitus with complication, without long-term current use of insulin (HCC)     Severe obesity (BMI 35.0-39. 9) with comorbidity ([facility] [address] 2018    Hypercholesterolemia     Hypertension     Depression      Current Outpatient Medications   Medication Sig Dispense Refill    metoprolol tartrate (LOPRESSOR) 50 mg tablet TAKE 1 TABLET BY MOUTH TWICE A  Tablet 0    pravastatin (PRAVACHOL) 20 mg tablet TAKE 1 TABLET BY MOUTH EVERY DAY 90 Tablet 1    metFORMIN ER (GLUCOPHAGE XR) 500 mg tablet TAKE 1 TABLET BY MOUTH TWICE A DAY WITH MEALS (Patient taking differently: 2 tabs BID) 180 Tablet 0    lancets (OneTouch UltraSoft Lancets) misc USE TO CHECK BLOOD SUGAR ONCE DAILY. DX E11.9 1 Each 11    methIMAzole (Tapazole) 5 mg tablet Take 5 mg by mouth. 1 tablet 4 times a week      cholecalciferol (VITAMIN D3) (2,000 UNITS /50 MCG) cap capsule Take 4,000 Units by mouth daily.  lisinopriL (PRINIVIL, ZESTRIL) 20 mg tablet       Blood-Glucose Meter (OneTouch Verio Reflect Meter) misc by Does Not Apply route.  glucose blood VI test strips (OneTouch Ultra Blue Test Strip) strip USE TO CHECK BLOOD SUGAR ONCE DAILY.  DX E11.9 100 Strip 11    aspirin delayed-release (ASPIR-81) 81 mg tablet Take 1 Tab by mouth daily. 90 Tab 3     Past Surgical History:   Procedure Laterality Date    COLONOSCOPY N/A 8/11/2016    COL[facility] Kelly [facility]ricio MD [facility] ENDOSCOPY    HX BREAST BIOPSY Left Years ago    Benign surgical biopsy    HX CATARACT REMOVAL Bilateral     US GUIDE ASP BREAST LT CYST W NDL Left Years ago    Benign      Lab Results   Component Value Date/Time    WBC 8.6 09/29/2020 08:39 AM    HGB 14.5 09/29/2020 08:39 AM    HCT 43.6 09/29/2020 08:39 AM    PLATELET 059 32/51/5263 08:39 AM    MCV 90 09/29/2020 08:39 AM     Lab Results   Component Value Date/Time    Cholesterol, total 132 09/29/2020 08:39 AM    HDL Cholesterol 41 09/29/2020 08:39 AM    LDL, calculated 61 09/29/2020 08:39 AM    LDL, calculated 46 03/17/2020 11:08 AM    LDL-C, External 71 12/30/2021 12:00 AM    Triglyceride 178 (H) 09/29/2020 08:39 AM    CHOL/HDL Ratio 4.3 08/16/2010 01:49 PM     Lab Results   Component Value Date/Time    GFR est non-AA 71 02/24/2021 12:48 PM    GFR est AA 81 02/24/2021 12:48 PM    Creatinine 0.86 02/24/2021 12:48 PM    BUN 14 02/24/2021 12:48 PM    Sodium 138 02/24/2021 12:48 PM    Potassium 5.1 02/24/2021 12:48 PM    Chloride 101 02/24/2021 12:48 PM    CO2 24 02/24/2021 12:48 PM    PTH, Intact 46 02/24/2021 12:48 PM        Review of Systems   Respiratory: Negative for shortness of breath. Cardiovascular: Negative for chest pain. Physical Exam  Constitutional:       General: She is not in acute distress. Appearance: Normal appearance. She is not ill-appearing, toxic-appearing or diaphoretic. HENT:      Head: Normocephalic and atraumatic. Right Ear: External ear normal.      Left Ear: External ear normal.   Eyes:      General:         Right eye: No discharge. Left eye: No discharge. Conjunctiva/sclera: Conjunctivae normal.      Pupils: Pupils are equal, round, and reactive to light. Cardiovascular:      Rate and Rhythm: Normal rate and regular rhythm. Heart sounds: No murmur heard. No friction rub. No gallop. Pulmonary:      Effort: No respiratory distress. Breath sounds: Normal breath sounds. No wheezing or rales. Chest:      Chest wall: No tenderness. Musculoskeletal:         General: Normal range of motion. Cervical back: Normal range of motion and neck supple. Skin:     General: Skin is warm and dry. Neurological:      Mental Status: She is alert and oriented to person, place, and time. Mental status is at baseline. Coordination: Coordination normal.      Gait: Gait normal.      Comments: Sensory exam of the foot is normal, tested with the monofilament. Good pulses, no lesions or ulcers, good peripheral pulses. Psychiatric:         Mood and Affect: Mood normal.         Behavior: Behavior normal.         ASSESSMENT and PLAN    ICD-10-CM ICD-9-CM    1. Controlled type 2 diabetes mellitus with complication, without long-term current use of insulin (HCC)     Controlled on metformin 2 tablets twice daily A1c at goal has repeat labs ordered per endocrinology that will be completed in about a month     E11.8 250.90  DIABETES FOOT EXAM   2. Severe obesity (BMI 35.0-39. 9) with comorbidity ([address]     Continue to work on portions diabetic friendly diet     E66.01 278.01    3. Primary hypertension       Initially elevated repeat improved continue metoprolol and lisinopril     I10 401.9    4. Hyperthyroidism       Follows with endocrinology stable on Tapazole     E05.90 242.90    5. Hypercholesterolemia       Controlled on Pravachol     E78.00 272.0    6. Reactive depression       Controlled without medication     F32.9 300.4         Scribed by Addy Martinez [facility], as dictated by Dr. Teri Awad. Current diagnosis and concerns discussed with pt at length. Pt understands risks and benefits or current treatment plan and medications, and accepts the treatment and medication with any possible risks.  Pt asks appropriate questions, which were answered. Pt was instructed to call with any concerns or problems. I have reviewed the note documented by the scribe. The services provided are my own.   The documentation is accurate

## 2022-05-23 ENCOUNTER — OFFICE VISIT (OUTPATIENT)
Dept: INTERNAL MEDICINE CLINIC | Age: 67
End: 2022-05-23
Payer: MEDICARE

## 2022-05-23 VITALS
HEART RATE: 85 BPM | RESPIRATION RATE: 18 BRPM | HEIGHT: 69 IN | TEMPERATURE: 97.8 F | SYSTOLIC BLOOD PRESSURE: 137 MMHG | OXYGEN SATURATION: 96 % | WEIGHT: 257 LBS | BODY MASS INDEX: 38.06 KG/M2 | DIASTOLIC BLOOD PRESSURE: 87 MMHG

## 2022-05-23 DIAGNOSIS — E78.00 HYPERCHOLESTEROLEMIA: ICD-10-CM

## 2022-05-23 DIAGNOSIS — E66.01 SEVERE OBESITY (BMI 35.0-39.9) WITH COMORBIDITY (HCC): ICD-10-CM

## 2022-05-23 DIAGNOSIS — F32.9 REACTIVE DEPRESSION: ICD-10-CM

## 2022-05-23 DIAGNOSIS — E05.90 HYPERTHYROIDISM: ICD-10-CM

## 2022-05-23 DIAGNOSIS — E11.8 CONTROLLED TYPE 2 DIABETES MELLITUS WITH COMPLICATION, WITHOUT LONG-TERM CURRENT USE OF INSULIN (HCC): Primary | ICD-10-CM

## 2022-05-23 DIAGNOSIS — I10 PRIMARY HYPERTENSION: ICD-10-CM

## 2022-05-23 PROCEDURE — 99214 OFFICE O/P EST MOD 30 MIN: CPT | Performed by: INTERNAL MEDICINE

## 2022-05-23 PROCEDURE — 2022F DILAT RTA XM EVC RTNOPTHY: CPT | Performed by: INTERNAL MEDICINE

## 2022-05-23 PROCEDURE — 1090F PRES/ABSN URINE INCON ASSESS: CPT | Performed by: INTERNAL MEDICINE

## 2022-05-23 PROCEDURE — G9717 DOC PT DX DEP/BP F/U NT REQ: HCPCS | Performed by: INTERNAL MEDICINE

## 2022-05-23 PROCEDURE — G8427 DOCREV CUR MEDS BY ELIG CLIN: HCPCS | Performed by: INTERNAL MEDICINE

## 2022-05-23 PROCEDURE — 82044 UR ALBUMIN SEMIQUANTITATIVE: CPT | Performed by: INTERNAL MEDICINE

## 2022-05-23 PROCEDURE — 1101F PT FALLS ASSESS-DOCD LE1/YR: CPT | Performed by: INTERNAL MEDICINE

## 2022-05-23 PROCEDURE — G0463 HOSPITAL OUTPT CLINIC VISIT: HCPCS | Performed by: INTERNAL MEDICINE

## 2022-05-23 PROCEDURE — G9899 SCRN MAM PERF RSLTS DOC: HCPCS | Performed by: INTERNAL MEDICINE

## 2022-05-23 PROCEDURE — G8754 DIAS BP LESS 90: HCPCS | Performed by: INTERNAL MEDICINE

## 2022-05-23 PROCEDURE — G8752 SYS BP LESS 140: HCPCS | Performed by: INTERNAL MEDICINE

## 2022-05-23 PROCEDURE — 3046F HEMOGLOBIN A1C LEVEL >9.0%: CPT | Performed by: INTERNAL MEDICINE

## 2022-05-23 PROCEDURE — G8417 CALC BMI ABV UP PARAM F/U: HCPCS | Performed by: INTERNAL MEDICINE

## 2022-05-23 PROCEDURE — G8399 PT W/DXA RESULTS DOCUMENT: HCPCS | Performed by: INTERNAL MEDICINE

## 2022-05-23 PROCEDURE — G8536 NO DOC ELDER MAL SCRN: HCPCS | Performed by: INTERNAL MEDICINE

## 2022-05-23 PROCEDURE — 3017F COLORECTAL CA SCREEN DOC REV: CPT | Performed by: INTERNAL MEDICINE

## 2022-05-23 RX ORDER — METOPROLOL TARTRATE 50 MG/1
TABLET ORAL
Qty: 180 TABLET | Refills: 0 | Status: SHIPPED | OUTPATIENT
Start: 2022-05-23 | End: 2022-08-17

## 2022-05-23 NOTE — PROGRESS NOTES
Identified pt with two pt identifiers(name and ). Reviewed record in preparation for visit and have obtained necessary documentation. Chief Complaint   Patient presents with    Follow-up     4 month f/up         Visit Vitals  BP (!) 164/102 (BP 1 Location: Left upper arm, BP Patient Position: Sitting, BP Cuff Size: Adult)   Pulse 85   Temp 97.8 °F (36.6 °C) (Temporal)   Resp 18   Ht 5' 9\" (1.753 m)   Wt 257 lb (116.6 kg)   SpO2 96%   BMI 37.95 kg/m²       Health Maintenance Due   Topic    Shingrix Vaccine Age 50> (1 of 2)    Eye Exam Retinal or Dilated     MICROALBUMIN Q1     Foot Exam Q1         1. \"Have you been to the ER, urgent care clinic since your last visit? Hospitalized since your last visit? \" No    2. \"Have you seen or consulted any other health care providers outside of the [address] [place] [address] since your last visit? \" No     3. For patients aged 39-70: Has the patient had a colonoscopy / FIT/ Cologuard? Yes - no Care Gap present      If the patient is female:    4. For patients aged 41-77: Has the patient had a mammogram within the past 2 years? Yes - no Care Gap present      5. For patients aged 21-65: Has the patient had a pap smear? NA - based on age or sex     Med Reconciliation: Completed        Patient is accompanied by self I have received verbal consent from Alexandrea Farr to discuss any/all medical information while they are present in the room.

## 2022-05-23 NOTE — LETTER
5/25/2022 1:08 PM    Ms. June [address] [id number]      Dear Care Provider    Please fax us the most recent eye exam so that we may update the patient's records for continuity of care. Our fax number: [phone].     Patient:   Gonsalo Come  1955        Sincerely,      French Hansen MD

## 2022-07-26 ENCOUNTER — TRANSCRIBE ORDER (OUTPATIENT)
Dept: SCHEDULING | Age: 67
End: 2022-07-26

## 2022-07-26 DIAGNOSIS — S46.012A STRAIN OF TENDON OF LEFT ROTATOR CUFF: ICD-10-CM

## 2022-07-26 DIAGNOSIS — S63.641A: ICD-10-CM

## 2022-08-02 ENCOUNTER — HOSPITAL ENCOUNTER (OUTPATIENT)
Dept: MRI IMAGING | Age: 67
Discharge: HOME | End: 2022-08-02
Attending: ORTHOPAEDIC SURGERY
Payer: MEDICARE

## 2022-08-02 DIAGNOSIS — S63.641A: ICD-10-CM

## 2022-08-02 DIAGNOSIS — S46.012A STRAIN OF TENDON OF LEFT ROTATOR CUFF: ICD-10-CM

## 2022-08-02 PROCEDURE — 73218 MRI UPPER EXTREMITY W/O DYE: CPT

## 2022-08-02 PROCEDURE — 73221 MRI JOINT UPR EXTREM W/O DYE: CPT

## 2022-08-04 RX ORDER — PRAVASTATIN SODIUM 20 MG/1
TABLET ORAL
Qty: 90 TABLET | Refills: 1 | Status: SHIPPED | OUTPATIENT
Start: 2022-08-04

## 2022-08-17 ENCOUNTER — HOSPITAL ENCOUNTER (OUTPATIENT)
Dept: PREADMISSION TESTING | Age: 67
Discharge: HOME | End: 2022-08-17
Payer: MEDICARE

## 2022-08-17 VITALS
HEIGHT: 70 IN | HEART RATE: 89 BPM | BODY MASS INDEX: 37.12 KG/M2 | TEMPERATURE: 98 F | RESPIRATION RATE: 20 BRPM | DIASTOLIC BLOOD PRESSURE: 84 MMHG | WEIGHT: 259.26 LBS | SYSTOLIC BLOOD PRESSURE: 154 MMHG

## 2022-08-17 LAB
ATRIAL RATE: 72 BPM
CALCULATED P AXIS, ECG09: 21 DEGREES
CALCULATED R AXIS, ECG10: 2 DEGREES
CALCULATED T AXIS, ECG11: 23 DEGREES
DIAGNOSIS, 93000: NORMAL
HGB BLD-MCNC: 14.4 G/DL (ref 11.5–16)
P-R INTERVAL, ECG05: 164 MS
Q-T INTERVAL, ECG07: 380 MS
QRS DURATION, ECG06: 78 MS
QTC CALCULATION (BEZET), ECG08: 416 MS
VENTRICULAR RATE, ECG03: 72 BPM

## 2022-08-17 PROCEDURE — 85018 HEMOGLOBIN: CPT

## 2022-08-17 PROCEDURE — 93005 ELECTROCARDIOGRAM TRACING: CPT

## 2022-08-17 RX ORDER — METOPROLOL TARTRATE 50 MG/1
TABLET ORAL
Qty: 180 TABLET | Refills: 0 | Status: SHIPPED | OUTPATIENT
Start: 2022-08-17 | End: 2022-10-26

## 2022-08-17 RX ORDER — UREA 10 %
100 LOTION (ML) TOPICAL DAILY
COMMUNITY

## 2022-08-17 NOTE — PERIOP NOTES
[address] INSTRUCTIONS    Surgery Date:   8-19-22    Your surgeon's office or [facility] staff will call you between 4 PM- 8 PM the day before surgery with your arrival time. If your surgery is on a Monday, you will receive a call the preceding Friday. Please report to [facility]/Admitting on the 1st floor. Bring your insurance card, photo identification, and any copayment ( if applicable). If you are going home the same day of your surgery, you must have a responsible adult to drive you home. You need to have a responsible adult to stay with you the first 24 hours after surgery and you should not drive a car for 24 hours following your surgery. Do NOT eat any solid foods after midnight the night before surgery including candy, mint or gum. You may drink clear liquids from midnight until 1 hour prior to your arrival. You may drink up to 12 ounces at one time every 4 hours. Please note special instructions, if applicable, below for medications. Do NOT drink alcohol or smoke 24 hours before surgery. STOP smoking for 14 days prior as it helps with breathing and healing after surgery. If you are being admitted to the hospital, please leave personal belongings/luggage in your car until you have an assigned hospital room number. Please wear comfortable clothes. Wear your glasses instead of contacts. We ask that all money, jewelry and valuables be left at home. Wear no make up, particularly mascara, the day of surgery. All body piercings, rings, and jewelry need to be removed and left at home. Please remove any nail polish or artifical nails from your fingernails. Please wear your hair loose or down. Please no pony-tails, buns, or any metal hair accessories. If you shower the morning of surgery, please do not apply any lotions or powders afterwards. You may wear deodorant, unless having breast surgery. Do not shave any body area within 24 hours of your surgery.   Please Community Care Team Documentation for Patient in [facility]  Subsequent Follow up     Patient remains at [facility] ([facility]). See previous [facility] Team notes. RRAT score: 32    PCP : Nancy Shook MD     PCP follow up appointment:  SNF to schedule    Per Tamiko Jeter at [facility], ESRD HD TTS [facility]. skilled nursing facility outreach.      Vy Denise, MSN, RN, ACNS-BC, [facility]  Nurse Navigator, [employer] [phone] follow all instructions to avoid any potential surgical cancellation. Should your physical condition change, (i.e. fever, cold, flu, etc.) please notify your surgeon as soon as possible. It is important to be on time. If a situation occurs where you may be delayed, please call:  [phone] / [phone] on the day of surgery. The Preadmission Testing staff can be reached at [phone]. Special instructions: NONE      Current Outpatient Medications   Medication Sig    cyanocobalamin (Vitamin B-12) 100 mcg tablet Take 100 mcg by mouth daily. pravastatin (PRAVACHOL) 20 mg tablet TAKE 1 TABLET BY MOUTH EVERY DAY    metoprolol tartrate (LOPRESSOR) 50 mg tablet TAKE 1 TABLET BY MOUTH TWICE A DAY    metFORMIN ER (GLUCOPHAGE XR) 500 mg tablet TAKE 1 TABLET BY MOUTH TWICE A DAY WITH MEALS (Patient taking differently: 2 tabs BID)    methIMAzole (TAPAZOLE) 5 mg tablet Take 5 mg by mouth. 1 tablet 4 times a week. Monday Wednesday Thursday AND SATURDAY    cholecalciferol (VITAMIN D3) (2,000 UNITS /50 MCG) cap capsule Take 4,000 Units by mouth daily. lisinopriL (PRINIVIL, ZESTRIL) 20 mg tablet 20 mg daily. aspirin delayed-release (ASPIR-81) 81 mg tablet Take 1 Tab by mouth daily. lancets (OneTouch UltraSoft Lancets) misc USE TO CHECK BLOOD SUGAR ONCE DAILY. DX E11.9    Blood-Glucose Meter (OneTouch Verio Reflect Meter) misc by Does Not Apply route. glucose blood VI test strips (OneTouch Ultra Blue Test Strip) strip USE TO CHECK BLOOD SUGAR ONCE DAILY. DX E11.9     No current facility-administered medications for this encounter.         YOU MUST ONLY TAKE THESE MEDICATIONS THE MORNING OF SURGERY WITH A SIP OF WATER: PRAVASTATIN, METOPROLOL  MEDICATIONS TO TAKE THE MORNING OF SURGERY ONLY IF NEEDED: NONE  HOLD these prescription medications BEFORE Surgery: HOLD METFORMIN 24 HOURS PRIOR TO SURGERY UNLESS OTHERWISE INSTRUCTED BY MD  Ask your surgeon/prescribing physician about when/if to STOP taking these medications: NONE  Stop all vitamins, herbal medicines and Aspirin containing products 7 days prior to surgery. Stop any non-steroidal anti-inflammatory drugs (i.e. Ibuprofen, Naproxen, Advil, Aleve) 3 days before surgery. You may take Tylenol. If you are currently taking Plavix, Coumadin,or any other blood-thinning/anticoagulant medication contact your prescribing physician for instructions. Eating and Drinking Before Surgery    You may eat a regular dinner at the usual time on the day before your surgery. Do NOT eat any solid foods after midnight unless your arrival time at the hospital is 3pm or later. You may drink clear liquids only from 12 midnight until 1 hours prior to your arrival time at the hospital on the day of your surgery. Do NOT drink alcohol. Clear liquids include:  Water  Fruit juices without pulp( i.e. apple juice)  Carbonated beverages  Black coffee (no cream/milk)  Tea (no cream/milk)  Gatorade  You may drink up to 12-16 ounces at one time every 4 hours between the hours of midnight and 1 hour before your arrival time at the hospital. Example- if your arrival time at the hospital is 6am, you may drink 12-16 ounces of clear liquids no later than 5am.  If your arrival time at the hospital is 3pm or later, you may eat a light breakfast before 8am.  A light breakfast includes: Toast or bagel (no butter)  Black coffee (no cream/milk)  Tea (no cream/milk)  Fruit juices without pulp ( i.e. apple juice)  Do NOT eat meat, eggs, vegetables or fruit  If you have any questions, please contact your surgeon's office. Preventing Infections Before and After - Your Surgery    IMPORTANT INSTRUCTIONS    You play an important role in your health and preparation for surgery. To reduce the germs on your skin you will need to shower with CHG soap (Chorhexidine gluconate 4%) two times before surgery.     CHG soap (Hibiclens, Hex-A-Clens or store brand)  CHG soap will be provided at your Preadmission Testing (PAT) appointment. If you do not have a PAT appointment before surgery, you may arrange to  CHG soap from our office or purchase CHG soap at a pharmacy, grocery or department store. You need to purchase TWO 4 ounce bottles to use for your 2 showers. Steps to follow:  Claudette Quezadaes your hair with your normal shampoo and your body with regular soap and rinse well to remove shampoo and soap from your skin. Wet a clean washcloth and turn off the shower. Put CHG soap on washcloth and apply to your entire body from the neck down. Do not use on your head, face or private parts(genitals). Do not use CHG soap on open sores, wounds or areas of skin irritation. Wash you body gently for 5 minutes. Do not wash your skin too hard. This soap does not create lather. Pay special attention to your underarms and from your belly button to your feet. Turn the shower back on and rinse well to get CHG soap off your body. Pat your skin dry with a clean, dry towel. Do not apply lotions or moisturizer. Put on clean clothes and sleep on fresh bed sheets and do not allow pets to sleep with you. Shower with CHG soap 2 times before your surgery  The evening before your surgery  The morning of your surgery      Tips to help prevent infections after your surgery:  Protect your surgical wound from germs:  Hand washing is the most important thing you and your caregivers can do to prevent infections. Keep your bandage clean and dry! Do not touch your surgical wound. Use clean, freshly washed towels and washcloths every time you shower; do not share bath linens with others. Until your surgical wound is healed, wear clothing and sleep on bed linens each day that are clean and freshly washed. Do not allow pets to sleep in your bed with you or touch your surgical wound. Do not smoke - smoking delays wound healing. This may be a good time to stop smoking.   If you have diabetes, it is important for you to manage your blood sugar levels properly before your surgery as well as after your surgery. Poorly managed blood sugar levels slow down wound healing and prevent you from healing completely. Patient Information Regarding COVID Restrictions      Day of Procedure    Please park in the parking deck or any designated visitor parking lot. Enter the facility through the Main Entrance of the hospital.  On the day of surgery, please provide the cell phone number of the person who will be waiting for you to the Patient Access representative at the time of registration. Please wear a mask on the day of your procedure. We are now allowing two designated visitors per stay. Pediatric patients may have 2 designated visitors. These two people may come in with you on the day of your procedure. The designated visitor must also wear a mask. Once your procedure and the immediate recovery period is completed, a nurse in the recovery area will contact your designated visitor to inform them of your room number or to otherwise review other pertinent information regarding your care. Social distancing practices are to be adhered to in waiting areas and the cafeteria. The patient was contacted  in person. She verbalized understanding of all instructions does not  need reinforcement.

## 2022-08-18 NOTE — H&P
History and Physical    Patient ID: Michael Swenson is a 75 y.o. female. Pain Score: 8  Chief Complaint: Follow-up of the Left Shoulder    She is status post a fall on her outstretched arm that occurred back in May. At that time she had sudden onset of pain and has had difficulty with the arm ever since. She also injured her right thumb. Roxy Mobley had concerns about a rotator cuff tear so she was sent for MRI to evaluate both the thumb and the shoulder for possible injury. Past Medical History:   Diagnosis Date   Diabetes mellitus   Hyperlipidemia   Hypertension     Past Surgical History:   Procedure Laterality Date   NO RELEVANT ORTHOPAEDIC SURGERIES   NO RELEVANT SURGERIES     Family History   Problem Relation Age of Onset   No Known Problems Mother   Coronary artery disease Father   Diabetes type II Brother   No Known Problems Sister   No Known Problems Son   No Known Problems Daughter     Social History     Tobacco Use   Smoking status: Never   Smokeless tobacco: Never   Substance Use Topics   Alcohol use: Yes   Drug use: Never     Review of Systems   8/9/2022    Constitutional: Unexplained: Negative  Genitourinary: Frequent Urination: Negative  HEENT: Vision Loss: Negative  Neurological: Memory Loss: Negative  Integumentary: Rash: Negative  Cardiovascular: Palpatations: Negative  Hematologic: Bruises/Bleeds Easily: Negative  Gastrointestinal: Constipation: Negative  Immunological: Seasonal Allergies: Negative  Musculoskeletal: Joint Pain: Positive  Objective:     Vitals:   08/09/22 0759   Weight: 260 lb   Height: 5' 10\"     Constitutional: No acute distress. Well nourished. Well developed. Psychiatric: Alert and oriented x3. Skin: No marked skin ulcers. Neurological: No apparent deficits      Patient Active Problem List    Diagnosis Date Noted    Hyperthyroidism 09/27/2021    Controlled type 2 diabetes mellitus with complication, without long-term current use of insulin (HCC)     Severe obesity (BMI 35.0-39. 9) with comorbidity ([facility] 04/17/2018    Hypercholesterolemia     Hypertension     Depression      Past Medical History:   Diagnosis Date    Depression     Diabetes ([facility]     Glucose intolerance (impaired glucose tolerance)     Hemochromatosis     Hypercholesterolemia     Hypertension     Menopause     LMP-46years old      Past Surgical History:   Procedure Laterality Date    COLONOSCOPY N/A 08/11/2016    COLONOSCOPY performed by Shannan Bates MD at [facility] ENDOSCOPY    HX BREAST BIOPSY Left Years ago    Benign surgical biopsy    HX CATARACT REMOVAL Bilateral 02/2022    HX HEENT  2021    THYROID BIOPSY    HX TUBAL LIGATION      US GUIDE ASP BREAST LT CYST W NDL Left Years ago    Benign      Prior to Admission medications    Medication Sig Start Date End Date Taking? Authorizing Provider   cyanocobalamin (Vitamin B-12) 100 mcg tablet Take 100 mcg by mouth daily. Provider, Historical   metoprolol tartrate (LOPRESSOR) 50 mg tablet TAKE 1 TABLET BY MOUTH TWICE A DAY 8/17/22   Tania Hinojosa MD   pravastatin (PRAVACHOL) 20 mg tablet TAKE 1 TABLET BY MOUTH EVERY DAY 8/4/22   Tania Hinojosa MD   metFORMIN ER (GLUCOPHAGE XR) 500 mg tablet TAKE 1 TABLET BY MOUTH TWICE A DAY WITH MEALS  Patient taking differently: 2 tabs BID 12/5/21   Tania Hinojosa MD   lancets (OneTouch UltraSoft Lancets) misc USE TO CHECK BLOOD SUGAR ONCE DAILY. DX E11.9 10/26/21   Tania Hinojosa MD   methIMAzole (TAPAZOLE) 5 mg tablet Take 5 mg by mouth. 1 tablet 4 times a week. Monday Wednesday Thursday AND SATURDAY    Provider, Historical   cholecalciferol (VITAMIN D3) (2,000 UNITS /50 MCG) cap capsule Take 4,000 Units by mouth daily. Provider, Historical   lisinopriL (PRINIVIL, ZESTRIL) 20 mg tablet 20 mg daily. 5/6/21   Provider, Historical   Blood-Glucose Meter (OneTouch Verio Reflect Meter) misc by Does Not Apply route.     Provider, Historical   glucose blood VI test strips (OneTouch Ultra Blue Test Strip) strip USE TO CHECK BLOOD SUGAR ONCE DAILY. DX E11.9 4/24/21   Brandon Ott MD   aspirin delayed-release (ASPIR-81) 81 mg tablet Take 1 Tab by mouth daily. 8/16/10   Kaitlin Hanson MD     No Known Allergies   Social History     Tobacco Use    Smoking status: Never    Smokeless tobacco: Never   Substance Use Topics    Alcohol use: Yes     Alcohol/week: 1.0 standard drink     Types: 1 Glasses of wine per week      Family History   Problem Relation Age of Onset    Heart Disease Mother     Hypertension Mother     Heart Disease Father     Cancer Father         skin    Hypertension Father     Dementia Father     No Known Problems Sister     Diabetes Brother     Heart Attack Brother     Peripheral Vascular Disease Brother     Suicide Brother     Obesity Brother     Breast Cancer Maternal Aunt         50's    Stroke Maternal Grandmother     Anesth Problems Neg Hx             No data found. General appearance: alert, cooperative, no distress, appears stated age  Head: Normocephalic, without obvious abnormality, atraumatic  Lungs: clear to auscultation bilaterally  Heart: regular rate and rhythm, S1, S2 normal, no murmur  Abdomen: soft, non-tender. Bowel sounds normal. No masses,  no organomegaly  Extremities: Left shoulder: No skin changes, rashes, erythema, deformity, or bruising. Full range of motion of the shoulder. Pain with active elevation. Grossly intact strength but pain on rotator cuff strength testing in external rotation and forward flexion. Weakness in abduction external rotation. Normal internal rotation strength. Good biceps and triceps strength. Good pulses good sensation with good cap refill and no edema distally. Normal stability. Pulses: 2+ and symmetric  Neurologic: Grossly normal           Radiographs:       No imaging obtained     Assessment:     1. Traumatic tear of left rotator cuff, unspecified tear extent, initial encounter   2.  Rupture of ulnar collateral ligament of right thumb, initial encounter Patient Active Problem List   Diagnosis   Controlled type 2 diabetes mellitus with complication, without long-term current use of insulin   Depression   Hypercholesterolemia   Hypertension   Hyperthyroidism   Severe obesity (BMI 35.0-39. 9) with comorbidity     Plan:     Is a large tear of her rotator cuff after a fall that occurred about 3 months ago. MRI shows retraction to the joint and in 1 area possibly to the glenoid. She has some edema in the muscle but no evidence of fat atrophy. This appears to be acute. She still has decent function with elevation but it is painful. Going to go ahead and set her up for a rotator cuff repair. She also has an opposite side some injury that occurred at the same time with her fall that is also 3 months out. She is going to see Dr. David Smith this afternoon and so they will discuss will try to do some sort of a staging of this if we can. We discussed rotator cuff surgery with the patient including the risks, benefits and possible complications. These include infection, bleeding, and failure to heal or resolve the patient's pain. We discussed the nature of the surgery including the need for general anesthesia, the usual use of a upper extremity block, and the use of arthroscopic equipment. We also discussed the possible need to make an open incision. As for recovery we discussed the need to be immobilized or limited for at least the 1st 6 weeks after surgery and then the post healing rehab required to make a full recovery. Patient voiced understanding of all these concepts and a desire to proceed. Orders Placed This Encounter   BMI Patient Education     Return in about 2 weeks (around 8/23/2022) for surgery. Aide Hunt MD     Patient was seen and examined. There have been no significant clinical changes since the completion of the above History and Physical.  Patient identified by surgeon; surgical site was confirmed by patient and surgeon.

## 2022-08-19 ENCOUNTER — ANESTHESIA (OUTPATIENT)
Dept: SURGERY | Age: 67
End: 2022-08-19
Payer: MEDICARE

## 2022-08-19 ENCOUNTER — HOSPITAL ENCOUNTER (OUTPATIENT)
Age: 67
Setting detail: OUTPATIENT SURGERY
Discharge: HOME | End: 2022-08-19
Attending: ORTHOPAEDIC SURGERY | Admitting: ORTHOPAEDIC SURGERY
Payer: MEDICARE

## 2022-08-19 ENCOUNTER — ANESTHESIA EVENT (OUTPATIENT)
Dept: SURGERY | Age: 67
End: 2022-08-19
Payer: MEDICARE

## 2022-08-19 VITALS
OXYGEN SATURATION: 97 % | DIASTOLIC BLOOD PRESSURE: 91 MMHG | SYSTOLIC BLOOD PRESSURE: 149 MMHG | TEMPERATURE: 98 F | HEART RATE: 89 BPM | RESPIRATION RATE: 21 BRPM

## 2022-08-19 DIAGNOSIS — S46.012A TRAUMATIC COMPLETE TEAR OF LEFT ROTATOR CUFF, INITIAL ENCOUNTER: Primary | ICD-10-CM

## 2022-08-19 LAB
GLUCOSE BLD STRIP.AUTO-MCNC: 114 MG/DL (ref 65–117)
GLUCOSE BLD STRIP.AUTO-MCNC: 127 MG/DL (ref 65–117)
SERVICE CMNT-IMP: ABNORMAL
SERVICE CMNT-IMP: NORMAL

## 2022-08-19 PROCEDURE — 77030040361 HC SLV COMPR DVT MDII -B: Performed by: ORTHOPAEDIC SURGERY

## 2022-08-19 PROCEDURE — 74011250636 HC RX REV CODE- 250/636: Performed by: ANESTHESIOLOGY

## 2022-08-19 PROCEDURE — 77030026438 HC STYL ET INTUB CARD -A: Performed by: ANESTHESIOLOGY

## 2022-08-19 PROCEDURE — C1713 ANCHOR/SCREW BN/BN,TIS/BN: HCPCS | Performed by: ORTHOPAEDIC SURGERY

## 2022-08-19 PROCEDURE — 76210000006 HC OR PH I REC 0.5 TO 1 HR: Performed by: ORTHOPAEDIC SURGERY

## 2022-08-19 PROCEDURE — 2709999900 HC NON-CHARGEABLE SUPPLY: Performed by: ORTHOPAEDIC SURGERY

## 2022-08-19 PROCEDURE — 77030002916 HC SUT ETHLN J&J -A: Performed by: ORTHOPAEDIC SURGERY

## 2022-08-19 PROCEDURE — 77030011390 HC GRSP SUT IDL J&J -C: Performed by: ORTHOPAEDIC SURGERY

## 2022-08-19 PROCEDURE — 77030006884 HC BLD SHV INCIS S&N -B: Performed by: ORTHOPAEDIC SURGERY

## 2022-08-19 PROCEDURE — 82962 GLUCOSE BLOOD TEST: CPT

## 2022-08-19 PROCEDURE — 76010000131 HC OR TIME 2 TO 2.5 HR: Performed by: ORTHOPAEDIC SURGERY

## 2022-08-19 PROCEDURE — 77030010428: Performed by: ORTHOPAEDIC SURGERY

## 2022-08-19 PROCEDURE — 74011250636 HC RX REV CODE- 250/636: Performed by: ORTHOPAEDIC SURGERY

## 2022-08-19 PROCEDURE — 77030018834: Performed by: ORTHOPAEDIC SURGERY

## 2022-08-19 PROCEDURE — 76210000021 HC REC RM PH II 0.5 TO 1 HR: Performed by: ORTHOPAEDIC SURGERY

## 2022-08-19 PROCEDURE — 74011250637 HC RX REV CODE- 250/637: Performed by: ANESTHESIOLOGY

## 2022-08-19 PROCEDURE — 74011000250 HC RX REV CODE- 250: Performed by: NURSE ANESTHETIST, CERTIFIED REGISTERED

## 2022-08-19 PROCEDURE — 77030008684 HC TU ET CUF COVD -B: Performed by: ANESTHESIOLOGY

## 2022-08-19 PROCEDURE — 74011250636 HC RX REV CODE- 250/636: Performed by: PHYSICIAN ASSISTANT

## 2022-08-19 PROCEDURE — 74011250637 HC RX REV CODE- 250/637: Performed by: ORTHOPAEDIC SURGERY

## 2022-08-19 PROCEDURE — 74011250636 HC RX REV CODE- 250/636: Performed by: NURSE ANESTHETIST, CERTIFIED REGISTERED

## 2022-08-19 PROCEDURE — 74011000250 HC RX REV CODE- 250: Performed by: PHYSICIAN ASSISTANT

## 2022-08-19 PROCEDURE — 76060000035 HC ANESTHESIA 2 TO 2.5 HR: Performed by: ORTHOPAEDIC SURGERY

## 2022-08-19 PROCEDURE — 77030016678 HC BUR SHV4 S&N -B: Performed by: ORTHOPAEDIC SURGERY

## 2022-08-19 DEVICE — ANCHOR SUTURE SFT 2.6 MM TRPL LD FIBERWIRE CL FIBERTAK: Type: IMPLANTABLE DEVICE | Site: SHOULDER | Status: FUNCTIONAL

## 2022-08-19 RX ORDER — SODIUM CHLORIDE 0.9 % (FLUSH) 0.9 %
5-40 SYRINGE (ML) INJECTION AS NEEDED
Status: DISCONTINUED | OUTPATIENT
Start: 2022-08-19 | End: 2022-08-19 | Stop reason: HOSPADM

## 2022-08-19 RX ORDER — HYDROMORPHONE HYDROCHLORIDE 1 MG/ML
0.2 INJECTION, SOLUTION INTRAMUSCULAR; INTRAVENOUS; SUBCUTANEOUS
Status: DISCONTINUED | OUTPATIENT
Start: 2022-08-19 | End: 2022-08-19 | Stop reason: HOSPADM

## 2022-08-19 RX ORDER — SODIUM CHLORIDE, SODIUM LACTATE, POTASSIUM CHLORIDE, CALCIUM CHLORIDE 600; 310; 30; 20 MG/100ML; MG/100ML; MG/100ML; MG/100ML
INJECTION, SOLUTION INTRAVENOUS
Status: DISCONTINUED | OUTPATIENT
Start: 2022-08-19 | End: 2022-08-19 | Stop reason: HOSPADM

## 2022-08-19 RX ORDER — ONDANSETRON 2 MG/ML
4 INJECTION INTRAMUSCULAR; INTRAVENOUS AS NEEDED
Status: DISCONTINUED | OUTPATIENT
Start: 2022-08-19 | End: 2022-08-19 | Stop reason: HOSPADM

## 2022-08-19 RX ORDER — SODIUM CHLORIDE, SODIUM LACTATE, POTASSIUM CHLORIDE, CALCIUM CHLORIDE 600; 310; 30; 20 MG/100ML; MG/100ML; MG/100ML; MG/100ML
75 INJECTION, SOLUTION INTRAVENOUS CONTINUOUS
Status: DISCONTINUED | OUTPATIENT
Start: 2022-08-19 | End: 2022-08-19 | Stop reason: HOSPADM

## 2022-08-19 RX ORDER — ROCURONIUM BROMIDE 10 MG/ML
INJECTION, SOLUTION INTRAVENOUS AS NEEDED
Status: DISCONTINUED | OUTPATIENT
Start: 2022-08-19 | End: 2022-08-19 | Stop reason: HOSPADM

## 2022-08-19 RX ORDER — PROPOFOL 10 MG/ML
INJECTION, EMULSION INTRAVENOUS AS NEEDED
Status: DISCONTINUED | OUTPATIENT
Start: 2022-08-19 | End: 2022-08-19 | Stop reason: HOSPADM

## 2022-08-19 RX ORDER — FENTANYL CITRATE 50 UG/ML
50 INJECTION, SOLUTION INTRAMUSCULAR; INTRAVENOUS AS NEEDED
Status: DISCONTINUED | OUTPATIENT
Start: 2022-08-19 | End: 2022-08-19 | Stop reason: HOSPADM

## 2022-08-19 RX ORDER — OXYCODONE AND ACETAMINOPHEN 5; 325 MG/1; MG/1
1 TABLET ORAL
Qty: 30 TABLET | Refills: 0 | Status: SHIPPED | OUTPATIENT
Start: 2022-08-19 | End: 2022-09-02

## 2022-08-19 RX ORDER — SUCCINYLCHOLINE CHLORIDE 20 MG/ML
INJECTION INTRAMUSCULAR; INTRAVENOUS AS NEEDED
Status: DISCONTINUED | OUTPATIENT
Start: 2022-08-19 | End: 2022-08-19 | Stop reason: HOSPADM

## 2022-08-19 RX ORDER — FENTANYL CITRATE 50 UG/ML
INJECTION, SOLUTION INTRAMUSCULAR; INTRAVENOUS AS NEEDED
Status: DISCONTINUED | OUTPATIENT
Start: 2022-08-19 | End: 2022-08-19 | Stop reason: HOSPADM

## 2022-08-19 RX ORDER — SODIUM CHLORIDE, SODIUM LACTATE, POTASSIUM CHLORIDE, CALCIUM CHLORIDE 600; 310; 30; 20 MG/100ML; MG/100ML; MG/100ML; MG/100ML
125 INJECTION, SOLUTION INTRAVENOUS CONTINUOUS
Status: DISCONTINUED | OUTPATIENT
Start: 2022-08-19 | End: 2022-08-19 | Stop reason: HOSPADM

## 2022-08-19 RX ORDER — ROPIVACAINE HYDROCHLORIDE 5 MG/ML
30 INJECTION, SOLUTION EPIDURAL; INFILTRATION; PERINEURAL ONCE
Status: DISCONTINUED | OUTPATIENT
Start: 2022-08-19 | End: 2022-08-19 | Stop reason: HOSPADM

## 2022-08-19 RX ORDER — MIDAZOLAM HYDROCHLORIDE 1 MG/ML
1 INJECTION, SOLUTION INTRAMUSCULAR; INTRAVENOUS AS NEEDED
Status: DISCONTINUED | OUTPATIENT
Start: 2022-08-19 | End: 2022-08-19 | Stop reason: HOSPADM

## 2022-08-19 RX ORDER — OXYCODONE AND ACETAMINOPHEN 5; 325 MG/1; MG/1
1 TABLET ORAL ONCE
Status: COMPLETED | OUTPATIENT
Start: 2022-08-19 | End: 2022-08-19

## 2022-08-19 RX ORDER — LIDOCAINE HYDROCHLORIDE 10 MG/ML
0.1 INJECTION, SOLUTION EPIDURAL; INFILTRATION; INTRACAUDAL; PERINEURAL AS NEEDED
Status: DISCONTINUED | OUTPATIENT
Start: 2022-08-19 | End: 2022-08-19 | Stop reason: HOSPADM

## 2022-08-19 RX ORDER — SODIUM CHLORIDE 0.9 % (FLUSH) 0.9 %
5-40 SYRINGE (ML) INJECTION EVERY 8 HOURS
Status: DISCONTINUED | OUTPATIENT
Start: 2022-08-19 | End: 2022-08-19 | Stop reason: HOSPADM

## 2022-08-19 RX ORDER — FENTANYL CITRATE 50 UG/ML
25 INJECTION, SOLUTION INTRAMUSCULAR; INTRAVENOUS
Status: DISCONTINUED | OUTPATIENT
Start: 2022-08-19 | End: 2022-08-19 | Stop reason: HOSPADM

## 2022-08-19 RX ORDER — SODIUM CHLORIDE 9 MG/ML
25 INJECTION, SOLUTION INTRAVENOUS CONTINUOUS
Status: DISCONTINUED | OUTPATIENT
Start: 2022-08-19 | End: 2022-08-19 | Stop reason: HOSPADM

## 2022-08-19 RX ORDER — MORPHINE SULFATE 2 MG/ML
2 INJECTION, SOLUTION INTRAMUSCULAR; INTRAVENOUS
Status: DISCONTINUED | OUTPATIENT
Start: 2022-08-19 | End: 2022-08-19 | Stop reason: HOSPADM

## 2022-08-19 RX ORDER — OXYCODONE AND ACETAMINOPHEN 5; 325 MG/1; MG/1
TABLET ORAL
Status: DISCONTINUED
Start: 2022-08-19 | End: 2022-08-19 | Stop reason: HOSPADM

## 2022-08-19 RX ORDER — MIDAZOLAM HYDROCHLORIDE 1 MG/ML
0.5 INJECTION, SOLUTION INTRAMUSCULAR; INTRAVENOUS
Status: DISCONTINUED | OUTPATIENT
Start: 2022-08-19 | End: 2022-08-19 | Stop reason: HOSPADM

## 2022-08-19 RX ORDER — ACETAMINOPHEN 325 MG/1
650 TABLET ORAL ONCE
Status: COMPLETED | OUTPATIENT
Start: 2022-08-19 | End: 2022-08-19

## 2022-08-19 RX ORDER — DIPHENHYDRAMINE HYDROCHLORIDE 50 MG/ML
12.5 INJECTION, SOLUTION INTRAMUSCULAR; INTRAVENOUS AS NEEDED
Status: DISCONTINUED | OUTPATIENT
Start: 2022-08-19 | End: 2022-08-19 | Stop reason: HOSPADM

## 2022-08-19 RX ORDER — ROPIVACAINE HYDROCHLORIDE 5 MG/ML
INJECTION, SOLUTION EPIDURAL; INFILTRATION; PERINEURAL
Status: COMPLETED | OUTPATIENT
Start: 2022-08-19 | End: 2022-08-19

## 2022-08-19 RX ORDER — LIDOCAINE HYDROCHLORIDE 20 MG/ML
INJECTION, SOLUTION EPIDURAL; INFILTRATION; INTRACAUDAL; PERINEURAL AS NEEDED
Status: DISCONTINUED | OUTPATIENT
Start: 2022-08-19 | End: 2022-08-19 | Stop reason: HOSPADM

## 2022-08-19 RX ORDER — ONDANSETRON 2 MG/ML
INJECTION INTRAMUSCULAR; INTRAVENOUS AS NEEDED
Status: DISCONTINUED | OUTPATIENT
Start: 2022-08-19 | End: 2022-08-19 | Stop reason: HOSPADM

## 2022-08-19 RX ADMIN — FENTANYL CITRATE 100 MCG: 50 INJECTION, SOLUTION INTRAMUSCULAR; INTRAVENOUS at 12:30

## 2022-08-19 RX ADMIN — WATER 2 G: 1 INJECTION INTRAMUSCULAR; INTRAVENOUS; SUBCUTANEOUS at 13:23

## 2022-08-19 RX ADMIN — MIDAZOLAM 2 MG: 1 INJECTION, SOLUTION INTRAMUSCULAR; INTRAVENOUS at 12:30

## 2022-08-19 RX ADMIN — SODIUM CHLORIDE, POTASSIUM CHLORIDE, SODIUM LACTATE AND CALCIUM CHLORIDE: 600; 310; 30; 20 INJECTION, SOLUTION INTRAVENOUS at 12:57

## 2022-08-19 RX ADMIN — FENTANYL CITRATE 50 MCG: 50 INJECTION, SOLUTION INTRAMUSCULAR; INTRAVENOUS at 13:06

## 2022-08-19 RX ADMIN — ONDANSETRON HYDROCHLORIDE 4 MG: 2 INJECTION, SOLUTION INTRAMUSCULAR; INTRAVENOUS at 14:38

## 2022-08-19 RX ADMIN — SODIUM CHLORIDE, POTASSIUM CHLORIDE, SODIUM LACTATE AND CALCIUM CHLORIDE 125 ML/HR: 600; 310; 30; 20 INJECTION, SOLUTION INTRAVENOUS at 11:30

## 2022-08-19 RX ADMIN — ROCURONIUM BROMIDE 10 MG: 10 SOLUTION INTRAVENOUS at 13:06

## 2022-08-19 RX ADMIN — PROPOFOL 150 MG: 10 INJECTION, EMULSION INTRAVENOUS at 13:06

## 2022-08-19 RX ADMIN — LIDOCAINE HYDROCHLORIDE 100 MG: 20 INJECTION, SOLUTION EPIDURAL; INFILTRATION; INTRACAUDAL; PERINEURAL at 13:06

## 2022-08-19 RX ADMIN — SUCCINYLCHOLINE CHLORIDE 200 MG: 20 INJECTION, SOLUTION INTRAMUSCULAR; INTRAVENOUS at 13:06

## 2022-08-19 RX ADMIN — ROPIVACAINE HYDROCHLORIDE 30 ML: 5 INJECTION, SOLUTION EPIDURAL; INFILTRATION; PERINEURAL at 12:23

## 2022-08-19 RX ADMIN — ACETAMINOPHEN 650 MG: 325 TABLET ORAL at 12:20

## 2022-08-19 RX ADMIN — PHENYLEPHRINE HYDROCHLORIDE 20 MCG/MIN: 10 INJECTION INTRAVENOUS at 14:00

## 2022-08-19 RX ADMIN — OXYCODONE AND ACETAMINOPHEN 1 TABLET: 5; 325 TABLET ORAL at 15:38

## 2022-08-19 NOTE — DISCHARGE INSTRUCTIONS
Discharge Instructions  Rotator cuff repair       MD Brunilda Akbar PA-C            Wound Care / Dressing Changes:  Two days after your surgery, you should remove your dressings. .  You can put a band-aid over each incision. It is not necessary to apply antibiotic ointment to your incisions. Xiao Junior / Bathing: You may shower 2 days after your surgery. Your dressing may be removed for showering. You may get your incisions slightly wet in the shower. Do not vigorously scrub your incisions. Dry incisions well and apply Band-aids after showering. Do not take a bath or get into a swimming pool or tub      Sling with Pillow:    Keep your arm in the sling at all times except when showering or changing your clothes. Keep your arm at your side when changing your clothes and showering. Please do not move it away from your body. Ice and Elevation:  Ice therapy should be used consistently for 48-72  hours after surgery. Subsequently, you should ice 3 times per day for 20-30 minutes at a time for the next 2 weeks to reduce pain and swelling. Please ensure there is protective barrier between your skin and the ice. Diet:  You may advance your regular diet as tolerated. Increase your clear liquid intake for the next 2-3 days. Sleeping: It is often more comfortable to sleep in a propped up position like in a recliner or propped up with a bunch of pillows, but you may sleep how ever you are comfortable with your sling on. Medications:   A prescription for pain medication  were sent to the pharmacy on file. ____oxycodone    Be sure to start taking your pain medication before your nerve block wears all the way off. All pain medications may cause constipation , so we recommend using otc stool softener such as Miralax , Colace or Milk of Magnesia .   Other possible side effects of pain medications are dizziness, headache, nausea, vomiting, and urinary retention. Discontinue the pain medication if you develop itching, rash, shortness of breath, or difficulties swallowing. If these symptoms become severe or arent relieved by discontinuing the medication, you should seek immediate medical attention. It is ok to supplement pain meds with Ibuprofen or Aleve for the first 2 weeks. Post op appointment :   A post op appointment has been scheduled for you on 8/31 at 2:10pm with Paulina Serna PA-C :__________________________________________________________________________________      Important Signs and Symptoms:  If you experience any of the following signs or symptoms, you should contact 's  office. Please be advised that if a problem arises which you feel requires immediate attention or you are unable to contact Dr. Richy Lewis office, you should seek immediate medical attention. If it is after 5:00pm call the main number [phone]         Signs and symptoms to watch for include:  A sudden increase in swelling and/or redness or warmth at the area of your surgery which isnt relieved by rest, ice, and elevation. Oral temperature greater than 101degrees for 12 hours or more which isnt relieved by an increase in fluid intake and taking Tylenol. Excessive drainage from your incisions or drainage which hasnt stopped by 72 hours after your surgery. Calf pain, fever, chills, shortness of breath, chest pain, nausea, vomiting or other signs and symptoms which are of concern to you       Thank you for following the above instructions.    You may reach us through my chart or call us at [phone]    ______________________________________________________________________    Anesthesia Discharge Instructions    After general anesthesia or intervenous sedation, for 24 hours or while taking prescription Narcotics:  Limit your activities  Do not drive or operate hazardous machinery  If you have not urinated within 8 hours after discharge, please contact your surgeon on call. Do not make important personal or business decisions  Do not drink alcoholic beverages    Report the following to your surgeon:  Excessive pain, swelling, redness or odor of or around the surgical area  Temperature over 100.5 degrees  Nausea and vomiting lasting longer than 4 hours or if unable to take medication  Any signs of decreased circulation or nerve impairment to extremity:  Change in color, persistent numbness, tingling, coldness or increased pain.   Any questions

## 2022-08-19 NOTE — ANESTHESIA PROCEDURE NOTES
Peripheral Block    Start time: 8/19/2022 12:17 PM  End time: 8/19/2022 12:24 PM  Performed by: Clarita Drummond MD  Authorized by: Clarita Drummond MD       Pre-procedure: Indications: at surgeon's request and post-op pain management    Preanesthetic Checklist: patient identified, risks and benefits discussed, site marked, timeout performed, anesthesia consent given and patient being monitored      Block Type:   Block Type:   Interscalene  Laterality:  Left  Monitoring:  Standard ASA monitoring, continuous pulse ox, frequent vital sign checks, heart rate, responsive to questions and oxygen  Injection Technique:  Single shot  Procedures: ultrasound guided and nerve stimulator    Patient Position: supine  Prep: povidone-iodine 7.5% surgical scrub and povidone-iodine 7.5% surgical scrub    Location:  Interscalene  Needle Type:  Stimuplex  Needle Gauge:  22 G  Needle Localization:  Nerve stimulator and ultrasound guidance  Motor Response comment:   Motor Response: minimal motor response >0.4 mA    Medication Injected:  Ropivacaine (PF) (NAROPIN)(0.5%) 5 mg/mL injection - Peripheral Nerve Block   30 mL - 8/19/2022 12:23:00 PM  Med Admin Time: 8/19/2022 12:23 AM    Assessment:  Number of attempts:  1  Injection Assessment:  Incremental injection every 5 mL, local visualized surrounding nerve on ultrasound, negative aspiration for blood, no paresthesia and negative aspiration for CSF  Patient tolerance:  Patient tolerated the procedure well with no immediate complications

## 2022-08-19 NOTE — OP NOTES
Shoulder Arthroscopy Operative Note      Patient: Alexandrea Veloz MRN: [id number]  SSN: [id number]    YOB: 1955  Age: 75 y.o. Sex: female        Date of Surgery: 8/19/2022    Preoperative Diagnosis:    S46.012A, M75.42, M19.012    Postoperative Diagnosis:   left Shoulder Impingement, AC arthritis, and Rotator cuff tear. Procedures: 1.left Shoulder Scope and Sub-Acromial Decompression   2. Arthroscopic Distal Clavicle Excision   3. Arthroscopic Rotator Cuff Repair    Surgeon(s) and Role:     Shamar Rollins MD (Jody) - Primary     Assistant: Mindy Moore PA-C    Anesthesia: General    Pathology: RC Tear, Impingement and AC Arthritis    Estimated Blood Loss:  < 20 ml      Specimens: * No specimens in log *     Condition: Stable    Complications: None     Hospital Problems  Date Reviewed: 5/23/2022   None      Indications: The patient is a 75 y.o. female who has left shoulder impingement and AC arthritis and a rotator cuff tear. The patient has exhausted nonoperative modalities and is electively admitted for outpatient right shoulder arthroscopy. PROCEDURE IN DETAIL: After the procedure was explained to the patient, including the risks, benefits and possible complications, the patient signed the informed consent. The patient was then taken to the operating suite. Following administration of general anesthesia and interscalene block for postoperative pain control and infusion of intravenous antibiotic, the patient was positioned on the operating table in the supine fashion. The  left  shoulder was then examined under anesthesia and noted to be stable through full range of motion. At this point, the patient was then carefully positioned in the lateral decubitus position, right side down. The axillary roll was placed. Care was taken to pad both the upper and lower extremities.  The left arm was then placed in the [employer] traction device in 45 degrees abduction and 30 degrees forward flexion with 15 pounds of traction. The left shoulder was then prepped and draped in the sterile fashion. The scope was introduced into the shoulder and diagnostic arthroscopy commenced. Articular surfaces of the humeral head and glenoid were visualized and noted to be intact. The anterior, posterior, superior and inferior labrum were visualized. There was a large tear. The biceps anchor and the biceps itself was intact. The undersurface of the rotator cuff was then visualized. There was a large tear of the entire supraspinatus and infraspinatus. The scope was introduced into the subacromial space. An anterior portal was created for inflow and  lateral portal was established for debridement using a spinal needle for localization. Hypertrophic hemorrhagic bursal tissue was then resected. The bursal side of the cuff was visualized and was torn as seen above. The underside of the acromion was identified and denuded of all soft tissue. An acromioplasty was then performed from the posterior portal using a helicut shanon with the cutting block technique. The Acromion was shaved down to a type one acromion to remove all impingement. At this point the distal clavicle was identified and an arthroscopic distal clavicle resection was then performed. The distal 10mm of distal clavicle was then resected. Care was taken to preserve the posterior/superior capsule. The scope was introduced back into the subacromial space. An arthroscopic rotator cuff repair was then performed utilizing 3 all suture triple loaded arthrex anchors . The anchors were placed laterally and one limb of each suture was placed through the lateral cuff in simple fashion. All of the sutures were tied with rodeur knots to appose the cuff to the tuberosity. This yielded an excellent cuff repair on the bursal side. At this point, with the procedure complete, all arthroscopic equipment was removed from the shoulder. The portals were reapproximated using 2-0 nylon sutures.   A sterile dressing was applied. The Sling/immobilzer was applied. The patient was then transferred to the Recovery Room in stable condition. Dion Dimas was present for the entirety of the case and was essential in its completion. She assisted with arm positioning and suture management during the repair as well as controlling the scope as the surgeon completed other tasks such as suture passage and knot tying that required two hands to do. Signed: Shamar Solo MD (8/19/2022 at 2:36 PM)  at 2:36 PM

## 2022-09-11 RX ORDER — METHIMAZOLE 5 MG/1
TABLET ORAL
Qty: 48 TABLET | Refills: 1 | OUTPATIENT
Start: 2022-09-11

## 2022-09-12 NOTE — ANESTHESIA POSTPROCEDURE EVALUATION
Post-Anesthesia Evaluation and Assessment    Patient: Alexandrea Zamora MRN: [id number]  SSN: [id number]    YOB: 1955  Age: 75 y.o. Sex: female      I have evaluated the patient and they are stable and ready for discharge from the PACU. Cardiovascular Function/Vital Signs  Visit Vitals  BP (!) 149/91   Pulse 89   Temp 36.7 °C (98 °F)   Resp 21   SpO2 97%       Patient is status post General anesthesia for Procedure(s):  LEFT SHOULDER SCOPE SUBACROMIAL DECOMPRESSION WITH DISTAL CLAVICLE EXCISION AND ROTATOR CUFF REPAIR. Nausea/Vomiting: None    Postoperative hydration reviewed and adequate. Pain:  Pain Scale 1: Numeric (0 - 10) (08/19/22 1545)  Pain Intensity 1: 0 (08/19/22 1545)   Managed    Neurological Status:   Neuro (WDL): Within Defined Limits (08/19/22 1545)  Neuro  Neurologic State: Alert (08/19/22 1545)  LUE Motor Response: Numbness (08/19/22 1545)  LLE Motor Response: Purposeful (08/19/22 1545)  RUE Motor Response: Purposeful (08/19/22 1545)  RLE Motor Response: Purposeful (08/19/22 1545)   At baseline    Mental Status, Level of Consciousness: Alert and  oriented to person, place, and time    Pulmonary Status:   O2 Device: None (08/19/22 1545)   Adequate oxygenation and airway patent    Complications related to anesthesia: None    Post-anesthesia assessment completed. No concerns    Signed By: Argenis Cavazos MD     September 12, 2022              Procedure(s):  LEFT SHOULDER SCOPE SUBACROMIAL DECOMPRESSION WITH DISTAL CLAVICLE EXCISION AND ROTATOR CUFF REPAIR.     general    <BSHSIANPOST>    INITIAL Post-op Vital signs:   Vitals Value Taken Time   /91 08/19/22 1530   Temp 36.8 °C (98.3 °F) 08/19/22 1505   Pulse 88 08/19/22 1530   Resp 18 08/19/22 1530   SpO2 98 % 08/19/22 1530

## 2022-09-13 NOTE — TELEPHONE ENCOUNTER
Patient returned call to office and declined to schedule an appointment. Patient was advised to contact her endo for med refill. See telephone encounter from 9/13/22.

## 2022-10-27 ENCOUNTER — TELEPHONE (OUTPATIENT)
Dept: INTERNAL MEDICINE CLINIC | Age: 67
End: 2022-10-27

## 2022-10-27 NOTE — TELEPHONE ENCOUNTER
----- Message from Marilin Cross MD sent at 10/26/2022  6:12 PM EDT -----  Pt due now for f/U please schedule there are openings next week

## 2022-10-29 RX ORDER — METOPROLOL TARTRATE 50 MG/1
TABLET ORAL
Qty: 14 TABLET | Refills: 0 | Status: SHIPPED | OUTPATIENT
Start: 2022-10-29

## 2022-10-31 NOTE — PROGRESS NOTES
This is the Subsequent Medicare Annual Wellness Exam, performed 12 months or more after the Initial AWV or the last Subsequent AWV    I have reviewed the patient's medical history in detail and updated the computerized patient record. Assessment/Plan   Education and counseling provided:  Are appropriate based on today's review and evaluation    1. Medicare annual wellness visit, subsequent     Depression Risk Factor Screening     3 most recent PHQ Screens 11/2/2022   Little interest or pleasure in doing things Not at all   Feeling down, depressed, irritable, or hopeless Not at all   Total Score PHQ 2 0       Alcohol & Drug Abuse Risk Screen    Do you average more than 1 drink per night or more than 7 drinks a week:  No    On any one occasion in the past three months have you have had more than 3 drinks containing alcohol:  No  1 wine nightly         Functional Ability and Level of Safety    Hearing: Hearing is good. Activities of Daily Living: The home contains: no safety equipment. Patient does total self care      Ambulation: with mild difficulty     Fall Risk:  Fall Risk Assessment, last 12 mths 11/2/2022   Able to walk? Yes   Fall in past 12 months? 1   Do you feel unsteady? 0   Are you worried about falling 0   Fall with injury?  1   Had fall last spirng no recurrent falls   Abuse Screen:  Patient is not abused   Lives w     Cognitive Screening    Has your family/caregiver stated any concerns about your memory: no     Cognitive Screening: Normal - Mini Cog Test    No memory concerns   Pt knows the month, day and year   Can recall 3/3 objects      Health Maintenance Due     Health Maintenance Due   Topic Date Due    Shingrix Vaccine Age 49> (1 of 2) Never done    COVID-19 Vaccine (5 - Booster for Tonya Jeannette series) 12/23/2021    Pneumococcal 65+ years (3 - PPSV23 if available, else PCV20) 05/17/2022    Flu Vaccine (1) 08/01/2022    Medicare Yearly Exam  09/28/2022       Patient Care Team Patient Care Team:  King Tia MD as PCP - General (Internal Medicine Physician)  King Tia MD as PCP - [facility] Empaneled Provider  Merline Salm, RN as Benefits Care Manager  Carmen Chinchilla MD (Ophthalmology)  Delano Hurtado MD (Obstetrics & Gynecology)  Marcelina Gupta MD (Gastroenterology)  Dr. Mary Yu ([address])  Ronald Beavers MD (Endocrinology Physician)  Shamar Camarena([place]) MD TOM (Orthopedic Surgery)    History     Patient Active Problem List   Diagnosis Code    Hypercholesterolemia E78.00    Hypertension I10    Depression F32. A    Severe obesity (BMI 35.0-39. 9) with comorbidity (HCC) E66.01    Controlled type 2 diabetes mellitus with complication, without long-term current use of insulin (HCC) E11.8    Hyperthyroidism E05.90     Past Medical History:   Diagnosis Date    Depression     Diabetes ([address]     Glucose intolerance (impaired glucose tolerance)     Hemochromatosis     Hypercholesterolemia     Hypertension     Menopause     LMP-46years old      Past Surgical History:   Procedure Laterality Date    COLONOSCOPY N/A 08/11/2016    COLONOSCOPY performed by Chel Oates MD at [facility] ENDOSCOPY    HX BREAST BIOPSY Left Years ago    Benign surgical biopsy    HX CATARACT REMOVAL Bilateral 02/2022    HX HEENT  2021    THYROID BIOPSY    HX TUBAL LIGATION      US GUIDE ASP BREAST LT CYST W NDL Left Years ago    Benign     Current Outpatient Medications   Medication Sig Dispense Refill    metoprolol tartrate (LOPRESSOR) 50 mg tablet TAKE 1 TABLET BY MOUTH TWICE A DAY 14 Tablet 0    cyanocobalamin (VITAMIN B12) 100 mcg tablet Take 100 mcg by mouth daily.       pravastatin (PRAVACHOL) 20 mg tablet TAKE 1 TABLET BY MOUTH EVERY DAY 90 Tablet 1    metFORMIN ER (GLUCOPHAGE XR) 500 mg tablet TAKE 1 TABLET BY MOUTH TWICE A DAY WITH MEALS (Patient taking differently: 2 tabs BID) 180 Tablet 0    lancets (OneTouch UltraSoft Lancets) misc USE TO CHECK BLOOD SUGAR ONCE DAILY. DX E11.9 1 Each 11    methIMAzole (TAPAZOLE) 5 mg tablet Take 5 mg by mouth. 1 tablet 4 times a week.  AND SATURDAY      cholecalciferol (VITAMIN D3) (2,000 UNITS /50 MCG) cap capsule Take 4,000 Units by mouth daily. lisinopriL (PRINIVIL, ZESTRIL) 20 mg tablet 20 mg daily. Blood-Glucose Meter misc by Does Not Apply route. glucose blood VI test strips (OneTouch Ultra Blue Test Strip) strip USE TO CHECK BLOOD SUGAR ONCE DAILY. DX E11.9 100 Strip 11    aspirin delayed-release (ASPIR-81) 81 mg tablet Take 1 Tab by mouth daily. 80 Tab 3     No Known Allergies    Family History   Problem Relation Age of Onset    Heart Disease Mother     Hypertension Mother     Heart Disease Father     Cancer Father         skin    Hypertension Father     Dementia Father     No Known Problems Sister     Diabetes Brother     Heart Attack Brother     Peripheral Vascular Disease Brother     Suicide Brother     Obesity Brother     Breast Cancer Maternal Aunt         50's    Stroke Maternal Grandmother     Anesth Problems Neg Hx      Social History     Tobacco Use    Smoking status: Never    Smokeless tobacco: Never   Substance Use Topics    Alcohol use: Yes     Alcohol/week: 1.0 standard drink     Types: 1 Glasses of wine per week          ACP not on file. SDMs are her  Radha Monae) and son Khang Mitchell).   Provided information in the past.    Colonoscopy: 15, Dr. Carmen Hays, repeat 10 years  Pap: Dr. Queta Benson, 10/12/20--due now   Mammogram: 22 negative --due for 6 month will schedule   AAA: no fmhx  DEXA: 20 nl--due     Tdap: 3/31/2014    Pneumovax: 2017, today   Dariel Leaks: 18  Zostavax: 2015  Shingrix: reminded she plans on getting at drugstore discussed  Flu shot:         Eye exam: Dr. Shannon Colón 3/29/22, annual     A1c: per fran   due  Hep C screen: , negative  Lipids: 3/22 LDL 67 per [facility]  EK/18  Covid: both + booster (moderna)    Medication reconciliation completed by MA and reviewed by me. Medical/surgical/social/family history reviewed and updated by me. Patient provided AVS and preventative screening table. Patient verbalized understanding of all information discussed.      Sagar Cm MD

## 2022-10-31 NOTE — PATIENT INSTRUCTIONS
Medicare Wellness Visit, Female     The best way to live healthy is to have a lifestyle where you eat a well-balanced diet, exercise regularly, limit alcohol use, and quit all forms of tobacco/nicotine, if applicable. Regular preventive services are another way to keep healthy. Preventive services (vaccines, screening tests, monitoring & exams) can help personalize your care plan, which helps you manage your own care. Screening tests can find health problems at the earliest stages, when they are easiest to treat. [facility] follows the current, evidence-based guidelines published by the [facility] Ern[facility] Aceves ([facility]) when recommending preventive services for our patients. Because we follow these guidelines, sometimes recommendations change over time as research supports it. (For example, mammograms used to be recommended annually. Even though Medicare will still pay for an annual mammogram, the newer guidelines recommend a mammogram every two years for women of average risk). Of course, you and your doctor may decide to screen more often for some diseases, based on your risk and your co-morbidities (chronic disease you are already diagnosed with). Preventive services for you include:  - Medicare offers their members a free annual wellness visit, which is time for you and your primary care provider to discuss and plan for your preventive service needs. Take advantage of this benefit every year!  -All adults over the age of 72 should receive the recommended pneumonia vaccines. Current USPSTF guidelines recommend a series of two vaccines for the best pneumonia protection.   -All adults should have a flu vaccine yearly and a tetanus vaccine every 10 years.   -All adults age 48 and older should receive the shingles vaccines (series of two vaccines).       -All adults age 38-68 who are overweight should have a diabetes screening test once every three years.   -All adults born between [birth year] and 1965 should be screened once for Hepatitis C.  -Other screening tests and preventive services for persons with diabetes include: an eye exam to screen for diabetic retinopathy, a kidney function test, a foot exam, and stricter control over your cholesterol.   -Cardiovascular screening for adults with routine risk involves an electrocardiogram (ECG) at intervals determined by your doctor.   -Colorectal cancer screenings should be done for adults age 54-65 with no increased risk factors for colorectal cancer. There are a number of acceptable methods of screening for this type of cancer. Each test has its own benefits and drawbacks. Discuss with your doctor what is most appropriate for you during your annual wellness visit. The different tests include: colonoscopy (considered the best screening method), a fecal occult blood test, a fecal DNA test, and sigmoidoscopy.    -A bone mass density test is recommended when a woman turns 65 to screen for osteoporosis. This test is only recommended one time, as a screening. Some providers will use this same test as a disease monitoring tool if you already have osteoporosis. -Breast cancer screenings are recommended every other year for women of normal risk, age 54-69.  -Cervical cancer screenings for women over age 72 are only recommended with certain risk factors.      Here is a list of your current Health Maintenance items (your personalized list of preventive services) with a due date:  Health Maintenance Due   Topic Date Due    Shingles Vaccine (1 of 2) Never done    COVID-19 Vaccine (5 - Booster for Moderna series) 12/23/2021    Pneumococcal Vaccine (3 - PPSV23 if available, else PCV20) 05/17/2022    Yearly Flu Vaccine (1) 08/01/2022    Annual Well Visit  09/28/2022

## 2022-11-01 NOTE — PROGRESS NOTES
HISTORY OF PRESENT ILLNESS  June Gibson Preciado is a 75 y.o. female. HPI  Last here 5/23/22. Pt is here for routine care. Has history of hypertension  BP today is 146/83, will repeat   No home BP readings for review  Continues on metoprolol 50mg BID and lisinopril 20 mg  Recall she was previously on lisinopril-hctz 10-12.5     She is diabetic     100 99   Continues on metformin 500mg BID (4 tablets gave her diarrhea)  She is taking trulicity 0.74 mg weekly she recently restarted this, will be increasing to 1.5 mg per Dr. Lexii Blas  No issues with diarrhea currently  Recall increased metformin caused diarrhea and ozempic was too expensive     Wt today is 262 lbs, up 19 lbs since lov  Discussed diet and w/l    Notes she has not been able to move as much as a result of shoulder inury      Reviewed labs  Ordered labs    Recall previously labs showed that she is a carrier for hemochromatosis  She ate at 8am this morning     She is now following with  Dr. Isela Morales (endo) for hyperthyroidism   Lov 7/22, will f/U 11/22/22   Continues Tapazole 5mg 4 times per week   She is taking trulicity 7.43 mg weekly, will be increasing to 1.5 mg     She is following w/ Dr. Dipesh Villavicencio (ortho) for L rotator cuff injury   Lov 8/9/22  Pt had L shoulder scope subacromial decompression with distal clavicle excision and rotator cuff repair 8/19/22  Reviewed note:  Plan:     Is a large tear of her rotator cuff after a fall that occurred about 3 months ago. MRI shows retraction to the joint and in 1 area possibly to the glenoid. She has some edema in the muscle but no evidence of fat atrophy. This appears to be acute. She still has decent function with elevation but it is painful. Going to go ahead and set her up for a rotator cuff repair. She also has an opposite side some injury that occurred at the same time with her fall that is also 3 months out.  She is going to see Dr. Cruz Roche this afternoon and so they will discuss will try to do some sort of a staging of this if we can. We discussed rotator cuff surgery with the patient including the risks, benefits and possible complications. These include infection, bleeding, and failure to heal or resolve the patient's pain. We discussed the nature of the surgery including the need for general anesthesia, the usual use of a upper extremity block, and the use of arthroscopic equipment. We also discussed the possible need to make an open incision. As for recovery we discussed the need to be immobilized or limited for at least the 1st 6 weeks after surgery and then the post healing rehab required to make a full recovery. Patient voiced understanding of all these concepts and a desire to proceed. Reviewed MRI shoulder 8/2/22:  Impression:     1. Massive full-thickness tears of the supraspinatus and infraspinatus. High   riding humeral head. Torn fibers retracted nearly to the glenoid. Mild atrophy   of the supraspinatus and moderate atrophy of the infraspinatus. Muscle edema   more noticeable within the infraspinatus   2. There is high-grade partial thickness tearing of the superior subscapularis   which allows the biceps long head tendon to sublux medially. The tendon is   partially torn as it enters the bicipital groove. 3. Acromioclavicular degenerative change       She saw Dr. Frances Hernández (ortho) for R thumb pain  Lov 8/9/22  Reviewed note:  PLAN:  Treatment Plan: She has a complete rupture of the ulnar collateral ligament which is now several months old. Do not think we will be able to perform a primary repair. I think her best option is in MP fusion. She is in agreement. Regional anesthesia. We discussed risks and benefits. Plan is to delay this until she has recovered from her rotator cuff surgery.      Continues on pravachol 20mg daily for cholesterol       No longer on zoloft 50mg after a rash, doing well - no issues with depression/anxiety     Pt lives w/ her   No safety equipment  She has 0-1 glasses of wine/night      Pt is functionally independent   Does own laundry  Does own driving  Does own bills and meds     No memory concerns   Knows the month, date, year, location   Can recall 3/3 objects        ACP not on file. SDMs are her  Td Haines) and son Susanne Santa). Provided information in the past.     PREVENTIVE:    Colonoscopy: 15, Dr. Rosa Finch, repeat 10 years  Pap: Dr. Brinda Trujillo, 10/12/20, due discussed   Mammogram: 22 negative, scheduled 10/22 but cancelled due to shoulder, will r/s   AAA: no fmhx  DEXA: 20 nl  Tdap: 3/31/2014    Pneumovax: 2017, will update today 22  Dafwouh94: 19  Zostavax: 2015  Shingrix: reminded she plans on getting at drug store  Flu shot: 21, will get today 22  Foot exam: 22  Microalbumin:   A1c:  fran 3/22 5.8 per [facility]  Eye exam: Dr. Ivania Chapman 3/29/22, had cataract surgery , scheduled 3/23 at [facility]   Hep C screen: , negative  Lipids: 3/22 LDL 67 per [facility]  EK/18   Covid: both + booster (moderna)    Patient Active Problem List    Diagnosis Date Noted    Hyperthyroidism 2021    Controlled type 2 diabetes mellitus with complication, without long-term current use of insulin (HCC)     Severe obesity (BMI 35.0-39. 9) with comorbidity ([facility] [address] 2018    Hypercholesterolemia     Hypertension     Depression      Current Outpatient Medications   Medication Sig Dispense Refill    metoprolol tartrate (LOPRESSOR) 50 mg tablet TAKE 1 TABLET BY MOUTH TWICE A DAY 14 Tablet 0    cyanocobalamin (VITAMIN B12) 100 mcg tablet Take 100 mcg by mouth daily. pravastatin (PRAVACHOL) 20 mg tablet TAKE 1 TABLET BY MOUTH EVERY DAY 90 Tablet 1    metFORMIN ER (GLUCOPHAGE XR) 500 mg tablet TAKE 1 TABLET BY MOUTH TWICE A DAY WITH MEALS (Patient taking differently: 2 tabs BID) 180 Tablet 0    lancets (OneTouch UltraSoft Lancets) misc USE TO CHECK BLOOD SUGAR ONCE DAILY.  DX E11.9 1 Each 11 methIMAzole (TAPAZOLE) 5 mg tablet Take 5 mg by mouth. 1 tablet 4 times a week. Monday Wednesday Thursday AND SATURDAY      cholecalciferol (VITAMIN D3) (2,000 UNITS /50 MCG) cap capsule Take 4,000 Units by mouth daily. lisinopriL (PRINIVIL, ZESTRIL) 20 mg tablet 20 mg daily. Blood-Glucose Meter misc by Does Not Apply route. glucose blood VI test strips (OneTouch Ultra Blue Test Strip) strip USE TO CHECK BLOOD SUGAR ONCE DAILY. DX E11.9 100 Strip 11    aspirin delayed-release (ASPIR-81) 81 mg tablet Take 1 Tab by mouth daily. 90 Tab 3     Past Surgical History:   Procedure Laterality Date    COLONOSCOPY N/A 08/11/2016    COLONOSCOPY performed by Freddie Hernández MD at [address] [place] [address] ENDOSCOPY    HX BREAST BIOPSY Left Years ago    Benign surgical biopsy    HX CATARACT REMOVAL Bilateral 02/2022    HX HEENT  2021    THYROID BIOPSY    HX TUBAL LIGATION      US GUIDE ASP BREAST LT CYST W NDL Left Years ago    Benign      Lab Results   Component Value Date/Time    WBC 8.6 09/29/2020 08:39 AM    HGB 14.4 08/17/2022 01:37 PM    HCT 43.6 09/29/2020 08:39 AM    PLATELET 822 26/79/1689 08:39 AM    MCV 90 09/29/2020 08:39 AM     Lab Results   Component Value Date/Time    Cholesterol, total 132 09/29/2020 08:39 AM    HDL Cholesterol 41 09/29/2020 08:39 AM    LDL, calculated 61 09/29/2020 08:39 AM    LDL, calculated 46 03/17/2020 11:08 AM    LDL-C, External 71 12/30/2021 12:00 AM    Triglyceride 178 (H) 09/29/2020 08:39 AM    CHOL/HDL Ratio 4.3 08/16/2010 01:49 PM     Lab Results   Component Value Date/Time    GFR est non-AA 71 02/24/2021 12:48 PM    GFR est AA 81 02/24/2021 12:48 PM    Creatinine 0.86 02/24/2021 12:48 PM    BUN 14 02/24/2021 12:48 PM    Sodium 138 02/24/2021 12:48 PM    Potassium 5.1 02/24/2021 12:48 PM    Chloride 101 02/24/2021 12:48 PM    CO2 24 02/24/2021 12:48 PM    PTH, Intact 46 02/24/2021 12:48 PM        Review of Systems   Respiratory:  Negative for shortness of breath.     Cardiovascular: Negative for chest pain. Physical Exam  Constitutional:       General: She is not in acute distress. Appearance: She is not ill-appearing, toxic-appearing or diaphoretic. HENT:      Head: Normocephalic and atraumatic. Right Ear: External ear normal.      Left Ear: External ear normal.   Eyes:      General:         Right eye: No discharge. Left eye: No discharge. Conjunctiva/sclera: Conjunctivae normal.      Pupils: Pupils are equal, round, and reactive to light. Neck:      Vascular: No carotid bruit. Cardiovascular:      Rate and Rhythm: Normal rate and regular rhythm. Heart sounds: No murmur heard. No friction rub. No gallop. Pulmonary:      Effort: No respiratory distress. Breath sounds: Normal breath sounds. No wheezing or rales. Chest:      Chest wall: No tenderness. Musculoskeletal:         General: Normal range of motion. Cervical back: Normal.      Right lower leg: Edema (mild pitting) present. Left lower leg: Edema (mild pitting) present. Comments: < 45 degree ROM on Abduction L shoulder    Skin:     General: Skin is warm and dry. Neurological:      Mental Status: She is oriented to person, place, and time. Mental status is at baseline. Coordination: Coordination normal.      Gait: Gait normal.   Psychiatric:         Mood and Affect: Mood normal.         Behavior: Behavior normal.       ASSESSMENT and PLAN    ICD-10-CM ICD-9-CM    1. Primary hypertension  I10 401.9 MICROALBUMIN, UR, RAND W/ MICROALB/CREAT RATIO      METABOLIC PANEL, COMPREHENSIVE      HEMOGLOBIN A1C WITH EAG   Initially elevated repeat improved continue current regimen of lisinopril 20 metoprolol twice daily    Check labs today for K creatinine sodium levels   TSH 3RD GENERATION      CBC W/O DIFF      LIPID PANEL      T4, FREE      VITAMIN B12      2.  Medicare annual wellness visit, subsequent  Z00.00 V70.0 MICROALBUMIN, UR, RAND W/ MICROALB/CREAT RATIO      METABOLIC PANEL, COMPREHENSIVE      HEMOGLOBIN A1C WITH EAG      TSH 3RD GENERATION      CBC W/O DIFF      LIPID PANEL      T4, FREE      VITAMIN B12      3. Controlled type 2 diabetes mellitus with complication, without long-term current use of insulin (HCC)  E11.8 250.90 MICROALBUMIN, UR, RAND W/ MICROALB/CREAT RATIO      METABOLIC PANEL, COMPREHENSIVE   Home readings okay no recent A1c to review we will check labs today continue metformin 500 mg twice daily increasing Trulicity to 1.5 mg weekly   HEMOGLOBIN A1C WITH EAG      TSH 3RD GENERATION      CBC W/O DIFF      LIPID PANEL      T4, FREE      VITAMIN B12      4. Severe obesity (BMI 35.0-39. 9) with comorbidity ([address]  E66.01 278.01 MICROALBUMIN, UR, RAND W/ MICROALB/CREAT RATIO      METABOLIC PANEL, COMPREHENSIVE      HEMOGLOBIN A1C WITH EAG      TSH 3RD GENERATION      CBC W/O DIFF   Patient has gained 20 pounds since last office visit immobility has played a role she is now back on Trulicity and will be tapering up on this to assist with weight loss needs to work more aggressively on portions   LIPID PANEL      T4, FREE      VITAMIN B12      5. Reactive depression  F32.9 300.4 MICROALBUMIN, UR, RAND W/ MICROALB/CREAT RATIO      METABOLIC PANEL, COMPREHENSIVE      HEMOGLOBIN A1C WITH EAG      TSH 3RD GENERATION      CBC W/O DIFF      LIPID PANEL   Controlled without medication   T4, FREE      VITAMIN B12      6. Hypercholesterolemia  E78.00 272.0 MICROALBUMIN, UR, RAND W/ MICROALB/CREAT RATIO      METABOLIC PANEL, COMPREHENSIVE      HEMOGLOBIN A1C WITH EAG      TSH 3RD GENERATION   Controlled on Pravachol in the past check lipids adjust dose as needed she has not eaten since 8 AM   CBC W/O DIFF      LIPID PANEL      T4, FREE      VITAMIN B12      7.  Hyperthyroidism  E05.90 242.90 MICROALBUMIN, UR, RAND W/ MICROALB/CREAT RATIO      METABOLIC PANEL, COMPREHENSIVE      HEMOGLOBIN A1C WITH EAG      TSH 3RD GENERATION   Stable on Tapazole we will fax labs to endocrine office CBC W/O DIFF      LIPID PANEL      T4, FREE      VITAMIN B12      8. Chronic left shoulder pain  M25.512 719.41 MICROALBUMIN, UR, RAND W/ MICROALB/CREAT RATIO    H29.00 641.80 METABOLIC PANEL, COMPREHENSIVE      HEMOGLOBIN A1C WITH EAG   Completing physical therapy status post surgical repair after injury   TSH 3RD GENERATION      CBC W/O DIFF      LIPID PANEL      T4, FREE      VITAMIN B12      9. B12 deficiency  E53.8 266.2 T4, FREE      VITAMIN B12   Check level prior to follow-up   Depression screen reviewed and negative. Scribed by Miladys Aleman, as dictated by Dr. Sunshine El. Current diagnosis and concerns discussed with pt at length. Pt understands risks and benefits or current treatment plan and medications, and accepts the treatment and medication with any possible risks. Pt asks appropriate questions, which were answered. Pt was instructed to call with any concerns or problems. I have reviewed the note documented by the scribe. The services provided are my own. The documentation is accurate.

## 2022-11-02 ENCOUNTER — OFFICE VISIT (OUTPATIENT)
Dept: INTERNAL MEDICINE CLINIC | Age: 67
End: 2022-11-02
Payer: MEDICARE

## 2022-11-02 VITALS
RESPIRATION RATE: 16 BRPM | OXYGEN SATURATION: 98 % | DIASTOLIC BLOOD PRESSURE: 77 MMHG | HEIGHT: 69 IN | SYSTOLIC BLOOD PRESSURE: 121 MMHG | HEART RATE: 70 BPM | BODY MASS INDEX: 38.83 KG/M2 | TEMPERATURE: 98.2 F | WEIGHT: 262.2 LBS

## 2022-11-02 DIAGNOSIS — F32.9 REACTIVE DEPRESSION: ICD-10-CM

## 2022-11-02 DIAGNOSIS — Z23 ENCOUNTER FOR IMMUNIZATION: ICD-10-CM

## 2022-11-02 DIAGNOSIS — E11.8 CONTROLLED TYPE 2 DIABETES MELLITUS WITH COMPLICATION, WITHOUT LONG-TERM CURRENT USE OF INSULIN (HCC): ICD-10-CM

## 2022-11-02 DIAGNOSIS — Z23 NEEDS FLU SHOT: ICD-10-CM

## 2022-11-02 DIAGNOSIS — G89.29 CHRONIC LEFT SHOULDER PAIN: ICD-10-CM

## 2022-11-02 DIAGNOSIS — E53.8 B12 DEFICIENCY: ICD-10-CM

## 2022-11-02 DIAGNOSIS — E78.00 HYPERCHOLESTEROLEMIA: ICD-10-CM

## 2022-11-02 DIAGNOSIS — E05.90 HYPERTHYROIDISM: ICD-10-CM

## 2022-11-02 DIAGNOSIS — I10 PRIMARY HYPERTENSION: Primary | ICD-10-CM

## 2022-11-02 DIAGNOSIS — Z00.00 MEDICARE ANNUAL WELLNESS VISIT, SUBSEQUENT: ICD-10-CM

## 2022-11-02 DIAGNOSIS — M25.512 CHRONIC LEFT SHOULDER PAIN: ICD-10-CM

## 2022-11-02 DIAGNOSIS — E66.01 SEVERE OBESITY (BMI 35.0-39.9) WITH COMORBIDITY (HCC): ICD-10-CM

## 2022-11-02 PROCEDURE — G8427 DOCREV CUR MEDS BY ELIG CLIN: HCPCS | Performed by: INTERNAL MEDICINE

## 2022-11-02 PROCEDURE — G8752 SYS BP LESS 140: HCPCS | Performed by: INTERNAL MEDICINE

## 2022-11-02 PROCEDURE — G8417 CALC BMI ABV UP PARAM F/U: HCPCS | Performed by: INTERNAL MEDICINE

## 2022-11-02 PROCEDURE — 2022F DILAT RTA XM EVC RTNOPTHY: CPT | Performed by: INTERNAL MEDICINE

## 2022-11-02 PROCEDURE — 90694 VACC AIIV4 NO PRSRV 0.5ML IM: CPT | Performed by: INTERNAL MEDICINE

## 2022-11-02 PROCEDURE — 1090F PRES/ABSN URINE INCON ASSESS: CPT | Performed by: INTERNAL MEDICINE

## 2022-11-02 PROCEDURE — G8399 PT W/DXA RESULTS DOCUMENT: HCPCS | Performed by: INTERNAL MEDICINE

## 2022-11-02 PROCEDURE — 3017F COLORECTAL CA SCREEN DOC REV: CPT | Performed by: INTERNAL MEDICINE

## 2022-11-02 PROCEDURE — G8754 DIAS BP LESS 90: HCPCS | Performed by: INTERNAL MEDICINE

## 2022-11-02 PROCEDURE — G9717 DOC PT DX DEP/BP F/U NT REQ: HCPCS | Performed by: INTERNAL MEDICINE

## 2022-11-02 PROCEDURE — G0463 HOSPITAL OUTPT CLINIC VISIT: HCPCS | Performed by: INTERNAL MEDICINE

## 2022-11-02 PROCEDURE — 99214 OFFICE O/P EST MOD 30 MIN: CPT | Performed by: INTERNAL MEDICINE

## 2022-11-02 PROCEDURE — 3044F HG A1C LEVEL LT 7.0%: CPT | Performed by: INTERNAL MEDICINE

## 2022-11-02 PROCEDURE — G9899 SCRN MAM PERF RSLTS DOC: HCPCS | Performed by: INTERNAL MEDICINE

## 2022-11-02 PROCEDURE — G8536 NO DOC ELDER MAL SCRN: HCPCS | Performed by: INTERNAL MEDICINE

## 2022-11-02 PROCEDURE — 1101F PT FALLS ASSESS-DOCD LE1/YR: CPT | Performed by: INTERNAL MEDICINE

## 2022-11-02 PROCEDURE — 90732 PPSV23 VACC 2 YRS+ SUBQ/IM: CPT | Performed by: INTERNAL MEDICINE

## 2022-11-02 PROCEDURE — G0439 PPPS, SUBSEQ VISIT: HCPCS | Performed by: INTERNAL MEDICINE

## 2022-11-02 NOTE — PROGRESS NOTES
1. \"Have you been to the ER, urgent care clinic since your last visit? Hospitalized since your last visit? No    2. \"Have you seen or consulted any other health care providers outside of the [address] [place] [address] since your last visit? \" No     3. For patients aged 39-70: Has the patient had a colonoscopy / FIT/ Cologuard? Yes - Care Gap present. Most recent result on file      If the patient is female:    4. For patients aged 41-77: Has the patient had a mammogram within the past 2 years? Yes - Care Gap present. Most recent result on file      5. For patients aged 21-65: Has the patient had a pap smear?  NA - based on age or sex

## 2022-11-03 LAB
ALBUMIN SERPL-MCNC: 4.1 G/DL (ref 3.5–5)
ALBUMIN/GLOB SERPL: 1.2 {RATIO} (ref 1.1–2.2)
ALP SERPL-CCNC: 90 U/L (ref 45–117)
ALT SERPL-CCNC: 38 U/L (ref 12–78)
ANION GAP SERPL CALC-SCNC: 3 MMOL/L (ref 5–15)
AST SERPL-CCNC: 22 U/L (ref 15–37)
BILIRUB SERPL-MCNC: 0.5 MG/DL (ref 0.2–1)
BUN SERPL-MCNC: 13 MG/DL (ref 6–20)
BUN/CREAT SERPL: 16 (ref 12–20)
CALCIUM SERPL-MCNC: 10 MG/DL (ref 8.5–10.1)
CHLORIDE SERPL-SCNC: 107 MMOL/L (ref 97–108)
CHOLEST SERPL-MCNC: 138 MG/DL
CO2 SERPL-SCNC: 28 MMOL/L (ref 21–32)
CREAT SERPL-MCNC: 0.82 MG/DL (ref 0.55–1.02)
CREAT UR-MCNC: 149 MG/DL
ERYTHROCYTE [DISTWIDTH] IN BLOOD BY AUTOMATED COUNT: 12.8 % (ref 11.5–14.5)
EST. AVERAGE GLUCOSE BLD GHB EST-MCNC: 117 MG/DL
GLOBULIN SER CALC-MCNC: 3.5 G/DL (ref 2–4)
GLUCOSE SERPL-MCNC: 100 MG/DL (ref 65–100)
HBA1C MFR BLD: 5.7 % (ref 4–5.6)
HCT VFR BLD AUTO: 43.6 % (ref 35–47)
HDLC SERPL-MCNC: 45 MG/DL
HDLC SERPL: 3.1 {RATIO} (ref 0–5)
HGB BLD-MCNC: 14 G/DL (ref 11.5–16)
LDLC SERPL CALC-MCNC: 57.8 MG/DL (ref 0–100)
MCH RBC QN AUTO: 30.3 PG (ref 26–34)
MCHC RBC AUTO-ENTMCNC: 32.1 G/DL (ref 30–36.5)
MCV RBC AUTO: 94.4 FL (ref 80–99)
MICROALBUMIN UR-MCNC: 2.52 MG/DL
MICROALBUMIN/CREAT UR-RTO: 17 MG/G (ref 0–30)
NRBC # BLD: 0 K/UL (ref 0–0.01)
NRBC BLD-RTO: 0 PER 100 WBC
PLATELET # BLD AUTO: 214 K/UL (ref 150–400)
PMV BLD AUTO: 10.5 FL (ref 8.9–12.9)
POTASSIUM SERPL-SCNC: 4.4 MMOL/L (ref 3.5–5.1)
PROT SERPL-MCNC: 7.6 G/DL (ref 6.4–8.2)
RBC # BLD AUTO: 4.62 M/UL (ref 3.8–5.2)
SODIUM SERPL-SCNC: 138 MMOL/L (ref 136–145)
T4 FREE SERPL-MCNC: 0.9 NG/DL (ref 0.8–1.5)
TRIGL SERPL-MCNC: 176 MG/DL (ref ?–150)
TSH SERPL DL<=0.05 MIU/L-ACNC: 0.5 UIU/ML (ref 0.36–3.74)
VIT B12 SERPL-MCNC: 825 PG/ML (ref 193–986)
VLDLC SERPL CALC-MCNC: 35.2 MG/DL
WBC # BLD AUTO: 8.5 K/UL (ref 3.6–11)

## 2022-11-15 DIAGNOSIS — E11.9 TYPE 2 DIABETES MELLITUS WITHOUT COMPLICATION, WITHOUT LONG-TERM CURRENT USE OF INSULIN (HCC): ICD-10-CM

## 2022-11-15 NOTE — TELEPHONE ENCOUNTER
Caller requests Refill of:  lancets (OneTouch UltraSoft Lancets) misc      Please send to:  [facility]/pharmacy #[id number] [facility]  [phone]      Visit Appointment History:   Future:   3/27/23  Previous: 11/2/22      Caller confirmed instructions and dosages as correct. Caller was advised that Meds will be refilled as soon as possible, however there can be a 48-72 business hour turn around on refill requests.

## 2022-11-17 DIAGNOSIS — E11.9 TYPE 2 DIABETES MELLITUS WITHOUT COMPLICATION, WITHOUT LONG-TERM CURRENT USE OF INSULIN (HCC): ICD-10-CM

## 2022-11-17 RX ORDER — LANCETS
EACH MISCELLANEOUS
Qty: 1 EACH | Refills: 11 | Status: SHIPPED | OUTPATIENT
Start: 2022-11-17

## 2022-11-17 NOTE — ANESTHESIA PREPROCEDURE EVALUATION
Anesthetic History   No history of anesthetic complications            Review of Systems / Medical History  Patient summary reviewed, nursing notes reviewed and pertinent labs reviewed    Pulmonary  Within defined limits                 Neuro/Psych   Within defined limits           Cardiovascular    Hypertension          Hyperlipidemia         GI/Hepatic/Renal  Within defined limits              Endo/Other    Diabetes: type 2    Obesity and morbid obesity     Other Findings              Physical Exam    Airway  Mallampati: II  TM Distance: > 6 cm  Neck ROM: normal range of motion   Mouth opening: Normal     Cardiovascular  Regular rate and rhythm,  S1 and S2 normal,  no murmur, click, rub, or gallop             Dental  No notable dental hx       Pulmonary  Breath sounds clear to auscultation               Abdominal  GI exam deferred       Other Findings            Anesthetic Plan    ASA: 3  Anesthesia type: general      Post-op pain plan if not by surgeon: peripheral nerve block single    Induction: Intravenous  Anesthetic plan and risks discussed with: Patient HISTORY OF PRESENT ILLNESS      I had the pleasure of seeing Paula Alonzo, a 54 year old female, per Aaron Calderon MD  request for right knee pain which has been occurring for several years.  The pain came on gradually.  There is not a history of trauma.  She had been seen by Rheumatology and received viscosupplementation injections in the past.  Initially gave relief but the injection she had earlier this year made her symptoms worse she believes.  She has been in therapy with minimal improvement.  She takes occasional tramadol and daily meloxicam for pain.  She has difficulty doing stairs and squatting down.  She describes primarily anterior retropatellar knee pain.  She states her knee feels unstable and that can give out on her.  Pain at rest rated a 2/10 and worse with activity.    PAST MEDICAL, FAMILY AND SOCIAL HISTORY      Past Medical History:   Diagnosis Date   • Anxiety    • Depressive disorder    • Diabetes mellitus (CMS/HCC)     diet controlled   • Fibromyalgia    • Fracture     right ankle - tx w/ casting    • GERD    • History of shingles 2018    mild case    • Hypercholesterolemia    • Migraines    • Osteoarthritis of right knee    • Psoriasis    • Right hip pain    • Urinary incontinence    • Varicose veins of both lower extremities    • Vitamin B12 deficiency       Past Surgical History:   Procedure Laterality Date   • Bariatric surgery  ~ 2006    lap gastric banding; repositioned port 4/2013   • Colonoscopy diagnostic  06/08/2011   • Esophagogastroduodenoscopy  8-20-15    inflammation   • Hb sclerotherapy single vein Right 06/05/2019    Dr Beth    • Hysterectomy  08/06/2010   • Lap gastric restrict w remove device &subq  05/21/2021    Michael; Robotic band and port removal   • Plantar fascia surgery Left     left foot   • Shoulder arthroscopy w/ rotator cuff repair Left       Social History     Occupational History   • Not on file   Tobacco Use   • Smoking status: Former Smoker      Packs/day: 2.00     Years: 15.00     Pack years: 30.00     Types: Cigarettes     Quit date: 1999     Years since quittin.8   • Smokeless tobacco: Never Used   Vaping Use   • Vaping Use: never used   Substance and Sexual Activity   • Alcohol use: Yes     Comment: 1 drink per month   • Drug use: Never   • Sexual activity: Not on file      Family History   Problem Relation Age of Onset   • Arthritis Mother    • COPD Mother    • Depression Mother    • Early death Father    • Aneurysm Father 41        brain    • Hypertension Father    • Depression Sister    • Diabetes Sister    • Arthritis Maternal Aunt    • Heart disease Maternal Uncle    • Arthritis Paternal Uncle    • Cancer Paternal Uncle    • High cholesterol Paternal Uncle    • High blood pressure Paternal Uncle    • Arthritis Maternal Grandmother    • Arthritis Paternal Grandmother    • High blood pressure Paternal Grandmother    • High cholesterol Paternal Grandmother    • Diabetes Brother    • Vision Loss Other      Current Outpatient Medications   Medication Sig Dispense Refill   • doxycycline monohydrate (ADOXA) 100 MG tablet Take 1 tablet by mouth in the morning and 1 tablet in the evening. Do all this for 10 days. 20 tablet 0   • Spacer/Aero-Holding Chambers (AEROCHAMBER) Use as directed 1 each 0   • benzonatate (TESSALON PERLES) 200 MG capsule Take 1 capsule by mouth every 8 hours as needed for Cough. 30 capsule 0   • ipratropium (ATROVENT) 0.06 % nasal spray Spray 2 sprays in each nostril in the morning and 2 sprays at noon and 2 sprays in the evening. Do all this for 10 days. 1 each 0   • ERENumab-aooe (Aimovig) 70 MG/ML injection Inject 1 mL into the skin every 30 days. 1.12 mL 11   • predniSONE (DELTASONE) 20 MG tablet Take 1 tablet by mouth daily. 10 tablet 0   • metFORMIN (GLUCOPHAGE-XR) 500 MG 24 hr tablet Take 1 tablet by mouth daily (with breakfast). 90 tablet 1   • tiZANidine (ZANAFLEX) 2 MG tablet Take 1 tablet by mouth 2 times daily as  needed for Muscle spasms. 15 tablet 0   • albuterol 108 (90 Base) MCG/ACT inhaler Inhale 2 puffs into the lungs every 4 hours as needed for Shortness of Breath or Wheezing. 8.5 g 0   • estradiol (ESTRACE VAGINAL) 0.1 MG/GM vaginal cream Place a small grape size amount inside the vagina using your finger (0.5g if using the applicator) every other night before bed. 42.5 g 12   • trospium (SANCTURA) 20 MG tablet Take 1 tablet by mouth in the morning and 1 tablet in the evening. 60 tablet 3   • amitriptyline (ELAVIL) 25 MG tablet Take 1 tablet by mouth nightly. 30 tablet 5   • eletriptan (RELPAX) 40 MG tablet Take 1 tablet by mouth as needed for Migraine. Take 1 tablet by mouth at onset of migraine. May repeat after 2 hours if needed. 10 tablet 5   • topiramate (TOPAMAX) 50 MG tablet Take 2 tablets by mouth daily. 90 tablet 5   • pravastatin (PRAVACHOL) 20 MG tablet TAKE 1 TABLET BY MOUTH EVERY NIGHT 90 tablet 1   • meloxicam (Mobic) 15 MG tablet Take 1 tablet by mouth daily. 90 tablet 3   • DULoxetine (CYMBALTA) 60 MG capsule Take 2 capsules by mouth daily. 180 capsule 3   • liraglutide - weight management (Saxenda) 18 MG/3ML pen-injector Take 0.6mg daily x 7 days and then increase to 1.2mg daily 15 mL 0   • traMADol (ULTRAM) 50 MG tablet Take 1 tablet by mouth every 8 hours as needed for Pain. 90 tablet 3   • gabapentin (NEURONTIN) 300 MG capsule TAKE 1 CAPSULE BY MOUTH NIGHTLY. 90 capsule 3     No current facility-administered medications for this visit.      ALLERGIES:  No Known Allergies    REVIEW OF SYSTEMS      Constitutional:  Denies fever, chills, sweats, fatigue, or unexplained weight change.  Respiratory:  Denies cough, wheezing/asthma, SOB (shortness of breath), PND (paroxysmal nocturnal dyspnea), or orthopnea.  Musculoskeletal: See HPI      PHYSICAL EXAM      Visit Vitals  Resp 16   Ht 5' 5\" (1.651 m)   Wt 122.5 kg (270 lb 1 oz)   BMI 44.94 kg/m²      GENERAL:  The patient is well nourished, well developed,  and in no acute distress.   HEENT: No obvious deformities, lesions, masses, or signs of trauma to the structures of the head and face.  NECK: Supple, no masses or lymphadenopathy  RESPIRATORY: Breathing is unlabored.  PSYCHOLOGICAL: The patient is awake, alert, and oriented to time, place, and person.  NEUROLOGICAL:  Patient has full sensation to light touch bilateral lower extremities. Patient responds appropriately to questions and answers.   VASCULAR:  The bilateral lower extremity there are no varicosities.  There is not edema.  Posterior tibial pulses intact.  GAIT:  The patient can rise to a standing position without difficulty.  She ambulates without a limp.  SKIN:  There are no rashes or sores on the lower extremities.  Skin and integumentary system are intact, warm, and dry.   HEAD:  Normocephalic, atraumatic.    EXTREMITIES:  Patient has normal coordination of both upper and lower extremities.  Both hips were symmetrical, had good range of motion, no instability, and good muscle tone.  Examination of the bilateral lower extremities reveals normal capillary refill.  There is no obvious muscle atrophy of either extremity.  Patient has Normal knee alignment.  Thigh and calf are soft and nontender.  The unaffected knee has full flexion and extension.     Compared to the opposite extremity, the right Medial joint line and Lateral joint line is tender to palpation.  Range of motion of the right is 0 to 115.  She has moderate patellar crepitus with range of motion.  There is not a large joint effusion.  There is a positive patellar grind test.  Her patella tracks laterally.  There is not patellar apprehension.  Lulu's test is positive, anterior and posterior drawer test is negative.  She is stable to varus and valgus stress at 0 and 30 degrees extension.  Negative Lachman's.  There is no prepatellar bursitis.    No significant pain or discomfort with range of motion of the hip joint.    X-RAY INTERPRETATION:   Standing three view x-ray of right knees shows minimal medial and lateral narrowing of the joint space.  The right knee has severe lateral patella tilt and advanced arthritic changes.      MRI KNEE JOINT WO CONTRAST RIGHT     HISTORY:  Knee instability, Knee pain, chronic, osteoarthritis suspected,  chronic pain, instability     TECHNIQUE:  Multiplanar images are performed of the right knee without  contrast.     COMPARISON:  10/3/2018, 8/19/2018     FINDINGS:     Anterior cruciate ligament:  Intact.     Posterior cruciate ligament:  Intact.     Lateral meniscus:  There is oblique elevated signal within the posterior  horn of the lateral meniscus with extension inferior articular surface  indicating a focal tear (series 4 image 22).     Medial meniscus:  Intact.     Lateral collateral ligament:  Intact.     Iliotibial band: Intact.     Biceps femoris tendon: Intact.     Popliteus tendon: Intact.     Medial collateral ligament:  Intact.     Quadriceps tendon:  There is intermediate elevated signal in the distal  quadriceps tendon indicating tendinosis.     Patellar tendon:  Intact.     Osseous structures:  No acute fracture.     Articular cartilage:  There is severe lateral patellofemoral articular  cartilage loss. There is mild subluxation and tilting of the patella  laterally. There is some scattered mild chondromalacia elsewhere in the  knee.      Additional findings: There is a mild joint effusion.           IMPRESSION:     Focal tear involving the posterior horn of the lateral meniscus.     Severe patellofemoral chondromalacia appears similar to the prior  examination. The patella is tilted and mildly subluxed laterally.     There is mild chondromalacia elsewhere in the knee.     Mild joint effusion.     Mild quadriceps tendinosis.       ASSESSMENT/PLAN      IMPRESSION:  Right knee advanced patellofemoral osteoarthritis with lateral meniscus tear    TREATMENT AND RECOMMENDATIONS:  For Paula Alonzo I went over  findings.  I discussed that she has lateral meniscus tear but given her advanced patellofemoral arthritic findings and nature of her symptoms I do not believe the meniscus tear is the primary cause of her pain.  I do not think arthroscopy with partial meniscectomy would give her significant long-term relief.  I recommend continued conservative treatment and also discussed arthroplasty for her osteoarthritis.  Ultimately she elected proceed with cortisone injection at this time which I think is reasonable.  Discussed the benefit of moderate activity as well as weight loss.  She will follow up as needed.    A time out was called with the patient, nurse and myself to confirm correct injection site and injection medicine.    Paula Alonzo is aware of the expectations of the injection.  The risks and complications of the injection have been explained as well as the alternatives.  The complications were discussed. There were no assurances or guarantees given to Paula Alonzo as to the result of the injection.    The right side knee was prepped with Betadine and alcohol, and the joint space was injected with 40 mg of Depo-Medrol 4 cc of 1% Lidocaine.  The patient tolerated the injection well.     Shiv Diez PA-C    Orthopedics    Dr. Carlo Kovacs (Supervising Physician)

## 2022-11-17 NOTE — TELEPHONE ENCOUNTER
Future Appointments:  Future Appointments   Date Time Provider Brandon Nowak   3/27/2023  1:00 PM Angeline Cooper MD [facility] BS AMB        Last Appointment With Me:  11/2/2022     Requested Prescriptions     Pending Prescriptions Disp Refills    lancets (OneTouch UltraSoft Lancets) misc 1 Each 11     Sig: USE TO CHECK BLOOD SUGAR ONCE DAILY.  DX E11.9

## 2022-11-21 RX ORDER — LANCETS
EACH MISCELLANEOUS
Qty: 1 EACH | Refills: 11 | OUTPATIENT
Start: 2022-11-21

## 2023-01-30 RX ORDER — PRAVASTATIN SODIUM 20 MG/1
TABLET ORAL
Qty: 90 TABLET | Refills: 1 | Status: SHIPPED | OUTPATIENT
Start: 2023-01-30

## 2023-02-20 ENCOUNTER — TRANSCRIBE ORDER (OUTPATIENT)
Dept: SCHEDULING | Age: 68
End: 2023-02-20

## 2023-02-20 DIAGNOSIS — Z12.31 VISIT FOR SCREENING MAMMOGRAM: Primary | ICD-10-CM

## 2023-03-14 RX ORDER — METOPROLOL TARTRATE 50 MG/1
TABLET ORAL
Qty: 14 TABLET | Refills: 0 | Status: SHIPPED | OUTPATIENT
Start: 2023-03-14

## 2023-03-21 RX ORDER — METOPROLOL TARTRATE 50 MG/1
TABLET ORAL
Qty: 14 TABLET | Refills: 0 | Status: SHIPPED | OUTPATIENT
Start: 2023-03-21

## 2023-03-22 NOTE — PROGRESS NOTES
HISTORY OF PRESENT ILLNESS  Alexandrea Roman is a 70 y.o. female. HPI  Last here 11/2/22. Pt is here for routine care. Has history of hypertension  BP today is 133/74  No home BP readings for review  Continues on metoprolol 50mg BID and lisinopril 20 mg  Has been having difficulty getting these from the drug store  Recall she was previously on lisinopril-hctz 10-12.5     She is diabetic     105   Continues on metformin 500mg BID (4 tablets gave her diarrhea)  She is taking trulicity 1.5 mg weekly (increased from 0.75 mg) per Dr. Nando Gan (see below)   No issues with diarrhea currently  Recall increased metformin caused diarrhea and ozempic was too expensive     Wt today is 257 lbs, down 5 lbs since lov  Discussed diet and w/l    Taking trulicity 1.5 mg weekly   She has been walking more      Reviewed labs  Ordered labs     Recall previously labs showed that she is a carrier for hemochromatosis     She is now following with Dr. Nando Gan (endo) for hyperthyroidism   Lov 12/22, will f/U in 5/23   Continues Tapazole 5mg 4 times per week   She is taking trulicity 1.5 mg weekly (increased from 0.75 mg) per Dr. Nando Gan      She is following w/ Dr. Erika Licona (ortho) for L rotator cuff injury   Lov1/23  Pt had L shoulder scope subacromial decompression with distal clavicle excision and rotator cuff repair 8/19/22  Notes she is still having some issues, but pain has improved      She saw Dr. James Bray (ortho) for R thumb pain  Lov 8/9/22 currently she is not interested in intervening on the thumb     Continues on pravachol 20mg daily for cholesterol       No longer on zoloft 50mg after a rash, doing well - no issues with depression/anxiety  3/23      Pt lives w/ her        ACP not on file. SDMs are her  Jose Ferrer) and son Hermann Gates).   Provided information in the past.     PREVENTIVE:    Colonoscopy: 8/11/16, Dr. Kristina You, repeat 10 years  Pap: Dr. Paula Ramesh, 10/12/20, due discussed Mammogram: 22 negative, scheduled 23   AAA: no fmhx  DEXA: 20 nl  Tdap: 3/31/2014    Pneumovax: 22  Shtevpc11: 19  Zostavax: 2015  Shingrix: reminded she plans on getting at drug store  Flu shot: 22  Foot exam: 22  Microalbumin: 17  A1c: kapros 3/22 5.8 per kapros  5.7  Eye exam: Dr. Gal Garcia 3/29/22, had cataract surgery , scheduled 3/31/23 at [facility]   Hep C screen: , negative  Lipids:  LDL 57  EK/18   Covid: 3 doses (moderna)    Patient Active Problem List    Diagnosis Date Noted    Hyperthyroidism 2021    Controlled type 2 diabetes mellitus with complication, without long-term current use of insulin ([facility])     Severe obesity (BMI 35.0-39. 9) with comorbidity ([facility] [address] 2018    Hypercholesterolemia     Hypertension     Depression      Current Outpatient Medications   Medication Sig Dispense Refill    metoprolol tartrate (LOPRESSOR) 50 mg tablet TAKE 1 TABLET BY MOUTH TWICE A DAY 14 Tablet 0    pravastatin (PRAVACHOL) 20 mg tablet TAKE 1 TABLET BY MOUTH EVERY DAY 90 Tablet 1    lancets (OneTouch UltraSoft Lancets) misc USE TO CHECK BLOOD SUGAR ONCE DAILY. DX E11.9 1 Each 11    cyanocobalamin (VITAMIN B12) 100 mcg tablet Take 100 mcg by mouth daily. metFORMIN ER (GLUCOPHAGE XR) 500 mg tablet TAKE 1 TABLET BY MOUTH TWICE A DAY WITH MEALS (Patient taking differently: 2 tabs BID) 180 Tablet 0    methIMAzole (TAPAZOLE) 5 mg tablet Take 5 mg by mouth. 1 tablet 4 times a week.  AND SATURDAY      cholecalciferol (VITAMIN D3) (2,000 UNITS /50 MCG) cap capsule Take 4,000 Units by mouth daily. lisinopriL (PRINIVIL, ZESTRIL) 20 mg tablet 20 mg daily. Blood-Glucose Meter misc by Does Not Apply route. glucose blood VI test strips (OneTouch Ultra Blue Test Strip) strip USE TO CHECK BLOOD SUGAR ONCE DAILY. DX E11.9 100 Strip 11    aspirin delayed-release (ASPIR-81) 81 mg tablet Take 1 Tab by mouth daily.  90 Tab 3 Past Surgical History:   Procedure Laterality Date    COLONOSCOPY N/A 08/11/2016    COLONOSCOPY performed by Alia Canada MD at [facility] END[facility]COPY    HX BREAST BIOPSY Left Years ago    Benign surgical biopsy    HX CATARACT REMOVAL Bilateral 02/2022    HX HEENT  2021    THYROID BIOPSY    HX TUBAL LIGATION      US GUIDE ASP BREAST LT CYST W NDL Left Years ago    Benign      Lab Results   Component Value Date/Time    WBC 8.5 11/02/2022 01:56 PM    HGB 14.0 11/02/2022 01:56 PM    HCT 43.6 11/02/2022 01:56 PM    PLATELET 457 08/73/9564 01:56 PM    MCV 94.4 11/02/2022 01:56 PM     Lab Results   Component Value Date/Time    Cholesterol, total 138 11/02/2022 01:56 PM    HDL Cholesterol 45 11/02/2022 01:56 PM    LDL, calculated 57.8 11/02/2022 01:56 PM    LDL-C, External 71 12/30/2021 12:00 AM    Triglyceride 176 (H) 11/02/2022 01:56 PM    CHOL/HDL Ratio 3.1 11/02/2022 01:56 PM     Lab Results   Component Value Date/Time    GFR est non-AA 71 02/24/2021 12:48 PM    GFR est AA 81 02/24/2021 12:48 PM    Creatinine 0.82 11/02/2022 01:56 PM    BUN 13 11/02/2022 01:56 PM    Sodium 138 11/02/2022 01:56 PM    Potassium 4.4 11/02/2022 01:56 PM    Chloride 107 11/02/2022 01:56 PM    CO2 28 11/02/2022 01:56 PM    PTH, Intact 46 02/24/2021 12:48 PM        Review of Systems   Respiratory:  Negative for shortness of breath. Cardiovascular:  Negative for chest pain. Physical Exam  Constitutional:       General: She is not in acute distress. Appearance: She is not ill-appearing, toxic-appearing or diaphoretic. HENT:      Head: Normocephalic and atraumatic. Right Ear: External ear normal.      Left Ear: External ear normal.   Eyes:      General:         Right eye: No discharge. Left eye: No discharge. Conjunctiva/sclera: Conjunctivae normal.      Pupils: Pupils are equal, round, and reactive to light. Cardiovascular:      Rate and Rhythm: Normal rate and regular rhythm.       Heart sounds: No murmur heard.    No friction rub. No gallop. Pulmonary:      Effort: No respiratory distress. Breath sounds: Normal breath sounds. No wheezing or rales. Chest:      Chest wall: No tenderness. Musculoskeletal:         General: Normal range of motion. Cervical back: Normal.      Right lower leg: No edema. Left lower leg: No edema. Skin:     General: Skin is warm and dry. Neurological:      Mental Status: She is oriented to person, place, and time. Mental status is at baseline. Coordination: Coordination normal.      Gait: Gait normal.   Psychiatric:         Mood and Affect: Mood normal.         Behavior: Behavior normal.       ASSESSMENT and PLAN    ICD-10-CM ICD-9-CM    1. Primary hypertension  I10 401.9    Well-controlled on metoprolol and lisinopril continue to change dose check metabolic panel for K creatinine sodium levels   2. Type 2 diabetes mellitus without complication, without long-term current use of insulin (HCC)  E11.9 250.00 lancets (OneTouch UltraSoft Lancets) misc      METABOLIC PANEL, BASIC   Has been adequately controlled on Trulicity 1.5 mg weekly and metformin 500 mg twice daily check A1c today adjust medication further as needed also follows with endocrinology   HEMOGLOBIN A1C WITH EAG      3. Severe obesity (BMI 35.0-39. 9) with comorbidity ([facility] [address]  E66.01 867.31    On Trulicity to assist with weight loss   4. Controlled type 2 diabetes mellitus with complication, without long-term current use of insulin (HCC)  E11.8 250.90    See above   5. Hypercholesterolemia  E78.00 272.0    Controlled on Pravachol   6. Hyperthyroidism  E05.90 242.90    Stable on Tapazole we will get endocrinology notes for review     Depression screen reviewed and negative. Scribed by Juno Ballesteros, as dictated by Dr. Leroy Goldberg. Current diagnosis and concerns discussed with pt at length.  Pt understands risks and benefits or current treatment plan and medications, and accepts the treatment and medication with any possible risks. Pt asks appropriate questions, which were answered. Pt was instructed to call with any concerns or problems. I have reviewed the note documented by the scribe. The services provided are my own. The documentation is accurate.

## 2023-03-27 ENCOUNTER — OFFICE VISIT (OUTPATIENT)
Dept: INTERNAL MEDICINE CLINIC | Age: 68
End: 2023-03-27
Payer: MEDICARE

## 2023-03-27 VITALS
TEMPERATURE: 96.9 F | HEART RATE: 63 BPM | SYSTOLIC BLOOD PRESSURE: 133 MMHG | WEIGHT: 257.8 LBS | OXYGEN SATURATION: 98 % | HEIGHT: 69 IN | RESPIRATION RATE: 16 BRPM | BODY MASS INDEX: 38.18 KG/M2 | DIASTOLIC BLOOD PRESSURE: 74 MMHG

## 2023-03-27 DIAGNOSIS — I10 PRIMARY HYPERTENSION: Primary | ICD-10-CM

## 2023-03-27 DIAGNOSIS — E11.8 CONTROLLED TYPE 2 DIABETES MELLITUS WITH COMPLICATION, WITHOUT LONG-TERM CURRENT USE OF INSULIN (HCC): ICD-10-CM

## 2023-03-27 DIAGNOSIS — E05.90 HYPERTHYROIDISM: ICD-10-CM

## 2023-03-27 DIAGNOSIS — E78.00 HYPERCHOLESTEROLEMIA: ICD-10-CM

## 2023-03-27 DIAGNOSIS — E11.9 TYPE 2 DIABETES MELLITUS WITHOUT COMPLICATION, WITHOUT LONG-TERM CURRENT USE OF INSULIN (HCC): ICD-10-CM

## 2023-03-27 DIAGNOSIS — E66.01 SEVERE OBESITY (BMI 35.0-39.9) WITH COMORBIDITY (HCC): ICD-10-CM

## 2023-03-27 PROCEDURE — G9717 DOC PT DX DEP/BP F/U NT REQ: HCPCS | Performed by: INTERNAL MEDICINE

## 2023-03-27 PROCEDURE — 99214 OFFICE O/P EST MOD 30 MIN: CPT | Performed by: INTERNAL MEDICINE

## 2023-03-27 PROCEDURE — 3017F COLORECTAL CA SCREEN DOC REV: CPT | Performed by: INTERNAL MEDICINE

## 2023-03-27 PROCEDURE — 1101F PT FALLS ASSESS-DOCD LE1/YR: CPT | Performed by: INTERNAL MEDICINE

## 2023-03-27 PROCEDURE — G8417 CALC BMI ABV UP PARAM F/U: HCPCS | Performed by: INTERNAL MEDICINE

## 2023-03-27 PROCEDURE — G9899 SCRN MAM PERF RSLTS DOC: HCPCS | Performed by: INTERNAL MEDICINE

## 2023-03-27 PROCEDURE — 2022F DILAT RTA XM EVC RTNOPTHY: CPT | Performed by: INTERNAL MEDICINE

## 2023-03-27 PROCEDURE — 1090F PRES/ABSN URINE INCON ASSESS: CPT | Performed by: INTERNAL MEDICINE

## 2023-03-27 PROCEDURE — G8427 DOCREV CUR MEDS BY ELIG CLIN: HCPCS | Performed by: INTERNAL MEDICINE

## 2023-03-27 PROCEDURE — 3046F HEMOGLOBIN A1C LEVEL >9.0%: CPT | Performed by: INTERNAL MEDICINE

## 2023-03-27 PROCEDURE — G0463 HOSPITAL OUTPT CLINIC VISIT: HCPCS | Performed by: INTERNAL MEDICINE

## 2023-03-27 PROCEDURE — G8536 NO DOC ELDER MAL SCRN: HCPCS | Performed by: INTERNAL MEDICINE

## 2023-03-27 PROCEDURE — G8399 PT W/DXA RESULTS DOCUMENT: HCPCS | Performed by: INTERNAL MEDICINE

## 2023-03-27 RX ORDER — METOPROLOL TARTRATE 50 MG/1
50 TABLET ORAL 2 TIMES DAILY
Qty: 180 TABLET | Refills: 1 | Status: SHIPPED | OUTPATIENT
Start: 2023-03-27

## 2023-03-27 RX ORDER — METFORMIN HYDROCHLORIDE 500 MG/1
500 TABLET, EXTENDED RELEASE ORAL 2 TIMES DAILY WITH MEALS
Qty: 180 TABLET | Refills: 0 | Status: SHIPPED | OUTPATIENT
Start: 2023-03-27

## 2023-03-27 RX ORDER — PRAVASTATIN SODIUM 20 MG/1
20 TABLET ORAL DAILY
Qty: 90 TABLET | Refills: 1 | Status: SHIPPED | OUTPATIENT
Start: 2023-03-27

## 2023-03-27 RX ORDER — LANCETS
EACH MISCELLANEOUS
Qty: 1 EACH | Refills: 11 | Status: SHIPPED | OUTPATIENT
Start: 2023-03-27

## 2023-03-27 RX ORDER — DULAGLUTIDE 1.5 MG/.5ML
1.5 INJECTION, SOLUTION SUBCUTANEOUS
COMMUNITY

## 2023-03-27 RX ORDER — LISINOPRIL 20 MG/1
20 TABLET ORAL DAILY
Qty: 90 TABLET | Refills: 1 | Status: SHIPPED | OUTPATIENT
Start: 2023-03-27

## 2023-03-27 NOTE — PROGRESS NOTES
1. \"Have you been to the ER, urgent care clinic since your last visit? Hospitalized since your last visit? \" No    2. \"Have you seen or consulted any other health care providers outside of the [address] [place] [address] since your last visit? \" No     3. For patients aged 39-70: Has the patient had a colonoscopy / FIT/ Cologuard? Yes - Care Gap present. Most recent result on file      If the patient is female:    4. For patients aged 41-77: Has the patient had a mammogram within the past 2 years? Yes - Care Gap present. Most recent result on file      5. For patients aged 21-65: Has the patient had a pap smear? Yes - Care Gap present.  Most recent result on file

## 2023-03-28 LAB
ANION GAP SERPL CALC-SCNC: 4 MMOL/L (ref 5–15)
BUN SERPL-MCNC: 14 MG/DL (ref 6–20)
BUN/CREAT SERPL: 16 (ref 12–20)
CALCIUM SERPL-MCNC: 10.5 MG/DL (ref 8.5–10.1)
CHLORIDE SERPL-SCNC: 106 MMOL/L (ref 97–108)
CO2 SERPL-SCNC: 28 MMOL/L (ref 21–32)
CREAT SERPL-MCNC: 0.86 MG/DL (ref 0.55–1.02)
EST. AVERAGE GLUCOSE BLD GHB EST-MCNC: 108 MG/DL
GLUCOSE SERPL-MCNC: 92 MG/DL (ref 65–100)
HBA1C MFR BLD: 5.4 % (ref 4–5.6)
POTASSIUM SERPL-SCNC: 4.4 MMOL/L (ref 3.5–5.1)
SODIUM SERPL-SCNC: 138 MMOL/L (ref 136–145)

## 2023-03-29 ENCOUNTER — TELEPHONE (OUTPATIENT)
Dept: INTERNAL MEDICINE CLINIC | Age: 68
End: 2023-03-29

## 2023-03-29 NOTE — TELEPHONE ENCOUNTER
Generic voicemail. Left message for patient to return call to office.  Patient will be due for 5 month f/up in August.

## 2023-03-31 RX ORDER — METHIMAZOLE 5 MG/1
TABLET ORAL
Qty: 48 TABLET | Refills: 1 | OUTPATIENT
Start: 2023-03-31

## 2023-04-22 DIAGNOSIS — Z12.31 VISIT FOR SCREENING MAMMOGRAM: Primary | ICD-10-CM

## 2023-04-25 ENCOUNTER — HOSPITAL ENCOUNTER (OUTPATIENT)
Dept: MAMMOGRAPHY | Age: 68
Discharge: HOME | End: 2023-04-25
Attending: INTERNAL MEDICINE
Payer: MEDICARE

## 2023-04-25 DIAGNOSIS — Z12.31 VISIT FOR SCREENING MAMMOGRAM: ICD-10-CM

## 2023-04-25 PROCEDURE — 77063 BREAST TOMOSYNTHESIS BI: CPT

## 2023-05-19 LAB — HBA1C MFR BLD HPLC: 5.7 %

## 2023-05-23 LAB
HBA1C MFR BLD HPLC: 5.7 %
LDL CHOLESTEROL, EXTERNAL: 63

## 2023-06-27 ENCOUNTER — TELEMEDICINE (OUTPATIENT)
Age: 68
End: 2023-06-27
Payer: MEDICARE

## 2023-06-27 DIAGNOSIS — J06.9 URI, ACUTE: Primary | ICD-10-CM

## 2023-06-27 PROCEDURE — 3017F COLORECTAL CA SCREEN DOC REV: CPT | Performed by: FAMILY MEDICINE

## 2023-06-27 PROCEDURE — 1090F PRES/ABSN URINE INCON ASSESS: CPT | Performed by: FAMILY MEDICINE

## 2023-06-27 PROCEDURE — 99213 OFFICE O/P EST LOW 20 MIN: CPT | Performed by: FAMILY MEDICINE

## 2023-06-27 PROCEDURE — G8427 DOCREV CUR MEDS BY ELIG CLIN: HCPCS | Performed by: FAMILY MEDICINE

## 2023-06-27 PROCEDURE — 1036F TOBACCO NON-USER: CPT | Performed by: FAMILY MEDICINE

## 2023-06-27 PROCEDURE — G8417 CALC BMI ABV UP PARAM F/U: HCPCS | Performed by: FAMILY MEDICINE

## 2023-06-27 PROCEDURE — 1123F ACP DISCUSS/DSCN MKR DOCD: CPT | Performed by: FAMILY MEDICINE

## 2023-06-27 PROCEDURE — G8399 PT W/DXA RESULTS DOCUMENT: HCPCS | Performed by: FAMILY MEDICINE

## 2023-06-27 SDOH — ECONOMIC STABILITY: HOUSING INSECURITY
IN THE LAST 12 MONTHS, WAS THERE A TIME WHEN YOU DID NOT HAVE A STEADY PLACE TO SLEEP OR SLEPT IN A SHELTER (INCLUDING NOW)?: NO

## 2023-06-27 SDOH — ECONOMIC STABILITY: FOOD INSECURITY: WITHIN THE PAST 12 MONTHS, YOU WORRIED THAT YOUR FOOD WOULD RUN OUT BEFORE YOU GOT MONEY TO BUY MORE.: NEVER TRUE

## 2023-06-27 SDOH — ECONOMIC STABILITY: INCOME INSECURITY: HOW HARD IS IT FOR YOU TO PAY FOR THE VERY BASICS LIKE FOOD, HOUSING, MEDICAL CARE, AND HEATING?: NOT HARD AT ALL

## 2023-06-27 SDOH — ECONOMIC STABILITY: FOOD INSECURITY: WITHIN THE PAST 12 MONTHS, THE FOOD YOU BOUGHT JUST DIDN'T LAST AND YOU DIDN'T HAVE MONEY TO GET MORE.: NEVER TRUE

## 2023-06-27 ASSESSMENT — PATIENT HEALTH QUESTIONNAIRE - PHQ9
1. LITTLE INTEREST OR PLEASURE IN DOING THINGS: 0
SUM OF ALL RESPONSES TO PHQ QUESTIONS 1-9: 0
SUM OF ALL RESPONSES TO PHQ9 QUESTIONS 1 & 2: 0
2. FEELING DOWN, DEPRESSED OR HOPELESS: 0
SUM OF ALL RESPONSES TO PHQ QUESTIONS 1-9: 0

## 2023-06-28 ENCOUNTER — OFFICE VISIT (OUTPATIENT)
Age: 68
End: 2023-06-28

## 2023-06-28 VITALS
OXYGEN SATURATION: 98 % | DIASTOLIC BLOOD PRESSURE: 85 MMHG | WEIGHT: 248.7 LBS | HEART RATE: 85 BPM | RESPIRATION RATE: 18 BRPM | BODY MASS INDEX: 36.73 KG/M2 | SYSTOLIC BLOOD PRESSURE: 177 MMHG | TEMPERATURE: 97.4 F

## 2023-06-28 DIAGNOSIS — J01.00 ACUTE NON-RECURRENT MAXILLARY SINUSITIS: Primary | ICD-10-CM

## 2023-06-28 PROBLEM — I10 HYPERTENSION: Status: ACTIVE | Noted: 2022-07-26

## 2023-06-28 PROBLEM — E66.9 OBESITY: Status: ACTIVE | Noted: 2017-06-08

## 2023-06-28 PROBLEM — E78.00 HYPERCHOLESTEROLEMIA: Status: ACTIVE | Noted: 2022-07-26

## 2023-06-28 PROBLEM — F32.A DEPRESSION: Status: ACTIVE | Noted: 2022-07-26

## 2023-06-28 PROBLEM — E11.8 CONTROLLED TYPE 2 DIABETES MELLITUS WITH COMPLICATION, WITHOUT LONG-TERM CURRENT USE OF INSULIN (HCC): Status: ACTIVE | Noted: 2022-07-26

## 2023-06-28 RX ORDER — FLUTICASONE PROPIONATE 50 MCG
1 SPRAY, SUSPENSION (ML) NASAL DAILY
Qty: 16 G | Refills: 0 | Status: SHIPPED | OUTPATIENT
Start: 2023-06-28

## 2023-06-28 RX ORDER — PREDNISONE 20 MG/1
20 TABLET ORAL 2 TIMES DAILY
Qty: 10 TABLET | Refills: 0 | Status: SHIPPED | OUTPATIENT
Start: 2023-06-28 | End: 2023-07-03

## 2023-06-28 RX ORDER — AMOXICILLIN AND CLAVULANATE POTASSIUM 875; 125 MG/1; MG/1
1 TABLET, FILM COATED ORAL 2 TIMES DAILY
Qty: 14 TABLET | Refills: 0 | Status: SHIPPED | OUTPATIENT
Start: 2023-06-28 | End: 2023-07-05

## 2023-06-28 RX ORDER — LISINOPRIL AND HYDROCHLOROTHIAZIDE 12.5; 1 MG/1; MG/1
TABLET ORAL
COMMUNITY
Start: 2012-03-21

## 2023-06-28 RX ORDER — DULAGLUTIDE 1.5 MG/.5ML
1.5 INJECTION, SOLUTION SUBCUTANEOUS
COMMUNITY

## 2023-06-28 ASSESSMENT — ENCOUNTER SYMPTOMS
SINUS PAIN: 1
SINUS PRESSURE: 1
HOARSE VOICE: 0
SHORTNESS OF BREATH: 0
SINUS COMPLAINT: 1
FACIAL SWELLING: 0
EYE PAIN: 1

## 2023-07-03 ENCOUNTER — APPOINTMENT (OUTPATIENT)
Facility: HOSPITAL | Age: 68
End: 2023-07-03
Payer: MEDICARE

## 2023-07-03 ENCOUNTER — HOSPITAL ENCOUNTER (EMERGENCY)
Facility: HOSPITAL | Age: 68
Discharge: HOME | End: 2023-07-03
Attending: EMERGENCY MEDICINE
Payer: MEDICARE

## 2023-07-03 ENCOUNTER — TELEPHONE (OUTPATIENT)
Age: 68
End: 2023-07-03

## 2023-07-03 VITALS
SYSTOLIC BLOOD PRESSURE: 177 MMHG | HEIGHT: 71 IN | RESPIRATION RATE: 14 BRPM | HEART RATE: 84 BPM | OXYGEN SATURATION: 98 % | BODY MASS INDEX: 34.81 KG/M2 | DIASTOLIC BLOOD PRESSURE: 101 MMHG | TEMPERATURE: 97.7 F | WEIGHT: 248.68 LBS

## 2023-07-03 DIAGNOSIS — R20.0 RIGHT FACIAL NUMBNESS: Primary | ICD-10-CM

## 2023-07-03 DIAGNOSIS — R20.2 PARESTHESIA: ICD-10-CM

## 2023-07-03 PROCEDURE — 70450 CT HEAD/BRAIN W/O DYE: CPT

## 2023-07-03 PROCEDURE — 99284 EMERGENCY DEPT VISIT MOD MDM: CPT

## 2023-07-03 RX ORDER — TOPIRAMATE 50 MG/1
50 TABLET, FILM COATED ORAL 2 TIMES DAILY
Qty: 28 TABLET | Refills: 1 | Status: SHIPPED | OUTPATIENT
Start: 2023-07-03 | End: 2023-07-17

## 2023-07-03 ASSESSMENT — ENCOUNTER SYMPTOMS: SHORTNESS OF BREATH: 0

## 2023-07-03 ASSESSMENT — PAIN SCALES - GENERAL: PAINLEVEL_OUTOF10: 0

## 2023-07-03 NOTE — TELEPHONE ENCOUNTER
Called, Spoke with Pt  Received two pt identifiers  Pt states drainage and pain has become better, however full right sided facial numbness is still present  Advised pt spoke with Dr. Bo Sutherland and she states that due to just finishing up the amoxacillin and prednison within the next day and the numbness not improving to go to the ED for further evaluation  Pt verbalizes understanding of the instructions and has no further questions at this time.

## 2023-07-03 NOTE — TELEPHONE ENCOUNTER
Patient states she needs a call back for Persistent Numbness in Right Side Of Face & Right Side Gum Swelling that patient reports had an appt last Tuesday W/Dr. Nydia Leroy & then Also had to go Wednesday to [facility] & was given Prescription with Diagnosis of Sinusitis that runs out tomorrow. Patient states she needs to be advised Plan of Care As Numbness is still there & Painful. No Appts available with PCP for F./U at time of call. Please advise.  Thank you

## 2023-07-03 NOTE — ED PROVIDER NOTES
dulaglutide               Where to Get Your Medications        These medications were sent to [facility]  [address] [place] [address]      Hours: 24-hours Phone: [phone]   topiramate 50 MG tablet          Follow up:  [facility] EMERGENCY DEPT  [address] [place] [address] Street  [address] [facility]  [phone]           Disclaimers   I was the first provider for this patient on this visit. To the best of my ability I reviewed relevant prior medical records, electrocardiograms, laboratories, and radiologic studies. The patient's presenting problems were discussed, and the patient was in agreement with the care plan formulated and outlined with them. Please note that this dictation was completed with Dragon voice recognition software. Quite often unanticipated grammatical, syntax, homophones, and other interpretive errors are inadvertently transcribed by the computer software. Please disregard these errors and excuse any errors that have escaped final proofreading. Marjan Tillman MD    I personally performed the services described in this documentation on this date [unfilled] for patient Sofie CARTER Vilma Hernandez.       Marjan Tillman MD  8:05 PM         Marjan Tillman MD  07/03/23 2005

## 2023-07-04 NOTE — ED NOTES
Dr. Alyssa Monaco reviewed discharge instructions with the patient. The patient verbalized understanding. All questions and concerns were addressed. The patient declined a wheelchair and is discharged ambulatory in the care of family members with instructions and prescriptions in hand. Pt is alert and oriented x 4. Respirations are clear and unlabored.        Esequiel Oliveros RN  07/03/23 2026

## 2023-07-04 NOTE — DISCHARGE INSTRUCTIONS
The precise cause of your symptoms today is not clear, but your CT scan is normal and your neurologic examination today is unremarkable. I do not see significant signs of a stroke at this time, nor signs of shingles or other problems of the face. You may be experiencing    I would recommend that you simply monitor the symptoms, or you can try taking Topamax as prescribed for the next couple weeks until your symptoms most likely resolve.

## 2023-08-22 ASSESSMENT — ENCOUNTER SYMPTOMS: SHORTNESS OF BREATH: 0

## 2023-08-22 NOTE — PROGRESS NOTES
HISTORY OF PRESENT ILLNESS  Sofie Sanchez is a 70 y.o. female. HPI    Last here 3/27/23. Pt is here for routine care. Has history of hypertension  BP today is 127/82  No home BP readings for review  Continues on metoprolol 50mg BID and lisinopril 20 mg  Recall she was previously on lisinopril-hctz 10-12.5     She is diabetic    BS AM 93 99 103  Continues on metformin 500mg BID (4 tablets gave her diarrhea)  She is taking trulicity 1.5 mg weekly per Dr. Ervin Ang (see below)   No issues with diarrhea currently  Recall increased metformin caused diarrhea and ozempic was too expensive     Wt today is 252 lbs, down 5 lbs since lov   Discussed diet and w/l    Taking trulicity 1.5 mg weekly      Reviewed labs  Ordered labs      Starting at the end of June she started having difficulty with sinus pressure and facial numbness ultimately she was felt to have potentially a shingles neuralgia though she never had the shingles rash    She presented to ED 7/5/23 for continued R facial numbness and tingling  Reviewed note:  Assessment/Plan:   Sofie Mera is a 70 y.o. female with pertinent medical history of DM2, HTN, and depression who presented with right sided facial numbness and headache stopping at the midline. Symptoms have been ongoing for around 10 days. Patient seen at multiple [facility] and completed course of steroids and abx for suspected sinusitis with no improvement. CTH at [facility] was unremarkable and pt discharged home. Symptoms continued to persist. Exam done in ED revealed right sided facial numbness in a V1 and V2 distribution and right sided erythema of the face with conjunctival injection. Erythema numbness in midline dermatomal distribution, lack of shingles vaccination, and childhood history of chicken pox increase concern for Zoster in Trigeminal nerve vs uncommon presentation of trigeminal neuralgia.  Will treat Zoster with Valacyclovir for one week, and get outpatient MRI and MRA to further evaluate for

## 2023-08-28 ENCOUNTER — OFFICE VISIT (OUTPATIENT)
Age: 68
End: 2023-08-28
Payer: MEDICARE

## 2023-08-28 VITALS
WEIGHT: 252 LBS | HEART RATE: 69 BPM | TEMPERATURE: 98.1 F | OXYGEN SATURATION: 98 % | DIASTOLIC BLOOD PRESSURE: 82 MMHG | BODY MASS INDEX: 35.28 KG/M2 | RESPIRATION RATE: 18 BRPM | HEIGHT: 71 IN | SYSTOLIC BLOOD PRESSURE: 127 MMHG

## 2023-08-28 DIAGNOSIS — E66.09 CLASS 1 OBESITY DUE TO EXCESS CALORIES WITH SERIOUS COMORBIDITY AND BODY MASS INDEX (BMI) OF 34.0 TO 34.9 IN ADULT: ICD-10-CM

## 2023-08-28 DIAGNOSIS — E53.8 B12 DEFICIENCY: ICD-10-CM

## 2023-08-28 DIAGNOSIS — F33.41 RECURRENT MAJOR DEPRESSIVE DISORDER, IN PARTIAL REMISSION (HCC): ICD-10-CM

## 2023-08-28 DIAGNOSIS — G50.0 TRIGEMINAL NEURALGIA OF RIGHT SIDE OF FACE: ICD-10-CM

## 2023-08-28 DIAGNOSIS — E05.90 HYPERTHYROIDISM: ICD-10-CM

## 2023-08-28 DIAGNOSIS — E11.8 CONTROLLED TYPE 2 DIABETES MELLITUS WITH COMPLICATION, WITHOUT LONG-TERM CURRENT USE OF INSULIN (HCC): Primary | ICD-10-CM

## 2023-08-28 DIAGNOSIS — I10 PRIMARY HYPERTENSION: ICD-10-CM

## 2023-08-28 DIAGNOSIS — E78.00 HYPERCHOLESTEROLEMIA: ICD-10-CM

## 2023-08-28 PROCEDURE — 3044F HG A1C LEVEL LT 7.0%: CPT | Performed by: INTERNAL MEDICINE

## 2023-08-28 PROCEDURE — 3017F COLORECTAL CA SCREEN DOC REV: CPT | Performed by: INTERNAL MEDICINE

## 2023-08-28 PROCEDURE — G8417 CALC BMI ABV UP PARAM F/U: HCPCS | Performed by: INTERNAL MEDICINE

## 2023-08-28 PROCEDURE — 2022F DILAT RTA XM EVC RTNOPTHY: CPT | Performed by: INTERNAL MEDICINE

## 2023-08-28 PROCEDURE — 1123F ACP DISCUSS/DSCN MKR DOCD: CPT | Performed by: INTERNAL MEDICINE

## 2023-08-28 PROCEDURE — 1036F TOBACCO NON-USER: CPT | Performed by: INTERNAL MEDICINE

## 2023-08-28 PROCEDURE — G8427 DOCREV CUR MEDS BY ELIG CLIN: HCPCS | Performed by: INTERNAL MEDICINE

## 2023-08-28 PROCEDURE — 3079F DIAST BP 80-89 MM HG: CPT | Performed by: INTERNAL MEDICINE

## 2023-08-28 PROCEDURE — G8399 PT W/DXA RESULTS DOCUMENT: HCPCS | Performed by: INTERNAL MEDICINE

## 2023-08-28 PROCEDURE — 99214 OFFICE O/P EST MOD 30 MIN: CPT | Performed by: INTERNAL MEDICINE

## 2023-08-28 PROCEDURE — 3074F SYST BP LT 130 MM HG: CPT | Performed by: INTERNAL MEDICINE

## 2023-08-28 PROCEDURE — 1090F PRES/ABSN URINE INCON ASSESS: CPT | Performed by: INTERNAL MEDICINE

## 2023-08-28 RX ORDER — METFORMIN HYDROCHLORIDE 500 MG/1
500 TABLET, EXTENDED RELEASE ORAL 2 TIMES DAILY WITH MEALS
Qty: 180 TABLET | Refills: 0 | Status: SHIPPED | OUTPATIENT
Start: 2023-08-28

## 2023-08-28 RX ORDER — LISINOPRIL 20 MG/1
20 TABLET ORAL DAILY
Qty: 90 TABLET | Refills: 1 | Status: SHIPPED | OUTPATIENT
Start: 2023-08-28

## 2023-08-28 RX ORDER — PRAVASTATIN SODIUM 20 MG
20 TABLET ORAL DAILY
Qty: 90 TABLET | Refills: 1 | Status: SHIPPED | OUTPATIENT
Start: 2023-08-28

## 2023-08-28 RX ORDER — METOPROLOL TARTRATE 50 MG/1
50 TABLET, FILM COATED ORAL 2 TIMES DAILY
Qty: 180 TABLET | Refills: 1 | Status: SHIPPED | OUTPATIENT
Start: 2023-08-28

## 2023-08-28 SDOH — ECONOMIC STABILITY: FOOD INSECURITY: WITHIN THE PAST 12 MONTHS, THE FOOD YOU BOUGHT JUST DIDN'T LAST AND YOU DIDN'T HAVE MONEY TO GET MORE.: NEVER TRUE

## 2023-08-28 SDOH — ECONOMIC STABILITY: FOOD INSECURITY: WITHIN THE PAST 12 MONTHS, YOU WORRIED THAT YOUR FOOD WOULD RUN OUT BEFORE YOU GOT MONEY TO BUY MORE.: NEVER TRUE

## 2023-08-28 SDOH — ECONOMIC STABILITY: INCOME INSECURITY: HOW HARD IS IT FOR YOU TO PAY FOR THE VERY BASICS LIKE FOOD, HOUSING, MEDICAL CARE, AND HEATING?: NOT HARD AT ALL

## 2023-08-28 ASSESSMENT — PATIENT HEALTH QUESTIONNAIRE - PHQ9
SUM OF ALL RESPONSES TO PHQ9 QUESTIONS 1 & 2: 0
8. MOVING OR SPEAKING SO SLOWLY THAT OTHER PEOPLE COULD HAVE NOTICED. OR THE OPPOSITE, BEING SO FIGETY OR RESTLESS THAT YOU HAVE BEEN MOVING AROUND A LOT MORE THAN USUAL: 0
7. TROUBLE CONCENTRATING ON THINGS, SUCH AS READING THE NEWSPAPER OR WATCHING TELEVISION: 0
4. FEELING TIRED OR HAVING LITTLE ENERGY: 0
6. FEELING BAD ABOUT YOURSELF - OR THAT YOU ARE A FAILURE OR HAVE LET YOURSELF OR YOUR FAMILY DOWN: 0
SUM OF ALL RESPONSES TO PHQ QUESTIONS 1-9: 0
3. TROUBLE FALLING OR STAYING ASLEEP: 0
SUM OF ALL RESPONSES TO PHQ QUESTIONS 1-9: 0
10. IF YOU CHECKED OFF ANY PROBLEMS, HOW DIFFICULT HAVE THESE PROBLEMS MADE IT FOR YOU TO DO YOUR WORK, TAKE CARE OF THINGS AT HOME, OR GET ALONG WITH OTHER PEOPLE: 0
SUM OF ALL RESPONSES TO PHQ QUESTIONS 1-9: 0
9. THOUGHTS THAT YOU WOULD BE BETTER OFF DEAD, OR OF HURTING YOURSELF: 0
5. POOR APPETITE OR OVEREATING: 0
2. FEELING DOWN, DEPRESSED OR HOPELESS: 0
SUM OF ALL RESPONSES TO PHQ QUESTIONS 1-9: 0
1. LITTLE INTEREST OR PLEASURE IN DOING THINGS: 0

## 2023-08-28 NOTE — PROGRESS NOTES
1. \"Have you been to the ER, urgent care clinic since your last visit? Hospitalized since your last visit? \" Yes [facility] 07/03/2023 Right facial numbness,[facility] 07/05/2023 Shingles     2. \"Have you seen or consulted any other health care providers outside of the [address] [place] [address] since your last visit? \" Yes [facility]      3. For patients aged 43-73: Has the patient had a colonoscopy / FIT/ Cologuard? Yes - Care Gap present. Most recent result on file      If the patient is female:    4. For patients aged 43-66: Has the patient had a mammogram within the past 2 years? Yes - Care Gap present. Most recent result on file      5. For patients aged 21-65: Has the patient had a pap smear? Yes - Care Gap present.  Most recent result on file

## 2023-08-29 LAB
ALBUMIN SERPL-MCNC: 4.1 G/DL (ref 3.5–5)
ALBUMIN/GLOB SERPL: 1.3 (ref 1.1–2.2)
ALP SERPL-CCNC: 79 U/L (ref 45–117)
ALT SERPL-CCNC: 39 U/L (ref 12–78)
ANION GAP SERPL CALC-SCNC: 3 MMOL/L (ref 5–15)
AST SERPL-CCNC: 22 U/L (ref 15–37)
BILIRUB SERPL-MCNC: 0.5 MG/DL (ref 0.2–1)
BUN SERPL-MCNC: 17 MG/DL (ref 6–20)
BUN/CREAT SERPL: 21 (ref 12–20)
CALCIUM SERPL-MCNC: 10.7 MG/DL (ref 8.5–10.1)
CHLORIDE SERPL-SCNC: 107 MMOL/L (ref 97–108)
CO2 SERPL-SCNC: 27 MMOL/L (ref 21–32)
CREAT SERPL-MCNC: 0.81 MG/DL (ref 0.55–1.02)
CREAT UR-MCNC: 94.1 MG/DL
EST. AVERAGE GLUCOSE BLD GHB EST-MCNC: 108 MG/DL
GLOBULIN SER CALC-MCNC: 3.1 G/DL (ref 2–4)
GLUCOSE SERPL-MCNC: 137 MG/DL (ref 65–100)
HBA1C MFR BLD: 5.4 % (ref 4–5.6)
MICROALBUMIN UR-MCNC: 1.77 MG/DL
MICROALBUMIN/CREAT UR-RTO: 19 MG/G (ref 0–30)
POTASSIUM SERPL-SCNC: 4.9 MMOL/L (ref 3.5–5.1)
PROT SERPL-MCNC: 7.2 G/DL (ref 6.4–8.2)
SODIUM SERPL-SCNC: 137 MMOL/L (ref 136–145)
VIT B12 SERPL-MCNC: 978 PG/ML (ref 193–986)

## 2023-10-04 ENCOUNTER — CLINICAL DOCUMENTATION (OUTPATIENT)
Age: 68
End: 2023-10-04

## 2023-11-20 LAB
ALBUMIN SERPL-MCNC: NORMAL G/DL
ALP BLD-CCNC: NORMAL U/L
ALT SERPL-CCNC: NORMAL U/L
ANION GAP SERPL CALCULATED.3IONS-SCNC: NORMAL MMOL/L
AST SERPL-CCNC: NORMAL U/L
BILIRUB SERPL-MCNC: NORMAL MG/DL
BUN BLDV-MCNC: NORMAL MG/DL
CALCIUM SERPL-MCNC: NORMAL MG/DL
CHLORIDE BLD-SCNC: NORMAL MMOL/L
CO2: NORMAL
CREAT SERPL-MCNC: NORMAL MG/DL
EGFR: 71
GLUCOSE BLD-MCNC: NORMAL MG/DL
HBA1C MFR BLD HPLC: 5.7 %
LDL CHOLESTEROL, EXTERNAL: 51
POTASSIUM SERPL-SCNC: NORMAL MMOL/L
SODIUM BLD-SCNC: NORMAL MMOL/L
TOTAL PROTEIN: NORMAL

## 2024-02-20 ENCOUNTER — OFFICE VISIT (OUTPATIENT)
Age: 69
End: 2024-02-20
Payer: MEDICARE

## 2024-02-20 VITALS
OXYGEN SATURATION: 99 % | RESPIRATION RATE: 20 BRPM | SYSTOLIC BLOOD PRESSURE: 137 MMHG | HEIGHT: 71 IN | WEIGHT: 258 LBS | BODY MASS INDEX: 36.12 KG/M2 | TEMPERATURE: 98.4 F | HEART RATE: 70 BPM | DIASTOLIC BLOOD PRESSURE: 72 MMHG

## 2024-02-20 DIAGNOSIS — F33.41 RECURRENT MAJOR DEPRESSIVE DISORDER, IN PARTIAL REMISSION (HCC): ICD-10-CM

## 2024-02-20 DIAGNOSIS — E05.90 HYPERTHYROIDISM: ICD-10-CM

## 2024-02-20 DIAGNOSIS — Z00.00 MEDICARE ANNUAL WELLNESS VISIT, SUBSEQUENT: ICD-10-CM

## 2024-02-20 DIAGNOSIS — E66.01 SEVERE OBESITY (BMI 35.0-39.9) WITH COMORBIDITY (HCC): ICD-10-CM

## 2024-02-20 DIAGNOSIS — G50.0 TRIGEMINAL NEURALGIA OF RIGHT SIDE OF FACE: ICD-10-CM

## 2024-02-20 DIAGNOSIS — E83.52 HYPERCALCEMIA: ICD-10-CM

## 2024-02-20 DIAGNOSIS — E11.8 CONTROLLED TYPE 2 DIABETES MELLITUS WITH COMPLICATION, WITHOUT LONG-TERM CURRENT USE OF INSULIN (HCC): ICD-10-CM

## 2024-02-20 DIAGNOSIS — I10 PRIMARY HYPERTENSION: ICD-10-CM

## 2024-02-20 DIAGNOSIS — I10 PRIMARY HYPERTENSION: Primary | ICD-10-CM

## 2024-02-20 PROCEDURE — G8484 FLU IMMUNIZE NO ADMIN: HCPCS | Performed by: INTERNAL MEDICINE

## 2024-02-20 PROCEDURE — 3075F SYST BP GE 130 - 139MM HG: CPT | Performed by: INTERNAL MEDICINE

## 2024-02-20 PROCEDURE — 1123F ACP DISCUSS/DSCN MKR DOCD: CPT | Performed by: INTERNAL MEDICINE

## 2024-02-20 PROCEDURE — 99214 OFFICE O/P EST MOD 30 MIN: CPT | Performed by: INTERNAL MEDICINE

## 2024-02-20 PROCEDURE — 1036F TOBACCO NON-USER: CPT | Performed by: INTERNAL MEDICINE

## 2024-02-20 PROCEDURE — G0439 PPPS, SUBSEQ VISIT: HCPCS | Performed by: INTERNAL MEDICINE

## 2024-02-20 PROCEDURE — 3046F HEMOGLOBIN A1C LEVEL >9.0%: CPT | Performed by: INTERNAL MEDICINE

## 2024-02-20 PROCEDURE — G8417 CALC BMI ABV UP PARAM F/U: HCPCS | Performed by: INTERNAL MEDICINE

## 2024-02-20 PROCEDURE — 3078F DIAST BP <80 MM HG: CPT | Performed by: INTERNAL MEDICINE

## 2024-02-20 PROCEDURE — 2022F DILAT RTA XM EVC RTNOPTHY: CPT | Performed by: INTERNAL MEDICINE

## 2024-02-20 PROCEDURE — 1090F PRES/ABSN URINE INCON ASSESS: CPT | Performed by: INTERNAL MEDICINE

## 2024-02-20 PROCEDURE — G8399 PT W/DXA RESULTS DOCUMENT: HCPCS | Performed by: INTERNAL MEDICINE

## 2024-02-20 PROCEDURE — G8427 DOCREV CUR MEDS BY ELIG CLIN: HCPCS | Performed by: INTERNAL MEDICINE

## 2024-02-20 PROCEDURE — 3017F COLORECTAL CA SCREEN DOC REV: CPT | Performed by: INTERNAL MEDICINE

## 2024-02-20 ASSESSMENT — PATIENT HEALTH QUESTIONNAIRE - PHQ9
5. POOR APPETITE OR OVEREATING: 0
8. MOVING OR SPEAKING SO SLOWLY THAT OTHER PEOPLE COULD HAVE NOTICED. OR THE OPPOSITE, BEING SO FIGETY OR RESTLESS THAT YOU HAVE BEEN MOVING AROUND A LOT MORE THAN USUAL: 0
3. TROUBLE FALLING OR STAYING ASLEEP: 0
1. LITTLE INTEREST OR PLEASURE IN DOING THINGS: 0
SUM OF ALL RESPONSES TO PHQ QUESTIONS 1-9: 0
2. FEELING DOWN, DEPRESSED OR HOPELESS: 0
10. IF YOU CHECKED OFF ANY PROBLEMS, HOW DIFFICULT HAVE THESE PROBLEMS MADE IT FOR YOU TO DO YOUR WORK, TAKE CARE OF THINGS AT HOME, OR GET ALONG WITH OTHER PEOPLE: 0
7. TROUBLE CONCENTRATING ON THINGS, SUCH AS READING THE NEWSPAPER OR WATCHING TELEVISION: 0
4. FEELING TIRED OR HAVING LITTLE ENERGY: 0
SUM OF ALL RESPONSES TO PHQ QUESTIONS 1-9: 0
SUM OF ALL RESPONSES TO PHQ9 QUESTIONS 1 & 2: 0
SUM OF ALL RESPONSES TO PHQ QUESTIONS 1-9: 0
6. FEELING BAD ABOUT YOURSELF - OR THAT YOU ARE A FAILURE OR HAVE LET YOURSELF OR YOUR FAMILY DOWN: 0
9. THOUGHTS THAT YOU WOULD BE BETTER OFF DEAD, OR OF HURTING YOURSELF: 0
SUM OF ALL RESPONSES TO PHQ QUESTIONS 1-9: 0

## 2024-02-20 ASSESSMENT — LIFESTYLE VARIABLES
HOW OFTEN DO YOU HAVE A DRINK CONTAINING ALCOHOL: 2-3 TIMES A WEEK
HOW MANY STANDARD DRINKS CONTAINING ALCOHOL DO YOU HAVE ON A TYPICAL DAY: 1 OR 2

## 2024-02-20 NOTE — PROGRESS NOTES
HISTORY OF PRESENT ILLNESS  Sofie Marin is a 68 y.o. female.  HPI    Last here 8/28/23.  Pt is here for routine care.      Has history of hypertension  BP today is 137/72  No home BP readings for review  Continues on metoprolol 50mg BID and lisinopril 20 mg  Recall she was previously on lisinopril-hctz 10-12.5     She is diabetic    BS 90s 80 103  Continues on metformin 500mg BID (4 tablets gave her diarrhea)  She is taking trulicity 1.5 mg weekly   No issues with diarrhea currently  Recall increased metformin caused diarrhea and ozempic was too expensive       She is following with Dr. Black (endo) for hyperthyroidism and diabetes  Lov 11/23, no med changes, still hypercalcemia has follow-up pending for May  Continues Tapazole 5mg 4 times per week   She is taking trulicity 1.5 mg weekly  per Dr. Black        Wt today is 258 lbs,  up 4 lbs since lov   Discussed diet and w/l    Taking trulicity 1.5 mg weekly she claims to go back on track     Reviewed labs  Ordered labs           She was sent to see neurology at [facility] for right facial trigeminal neuralgia felt to be from shingles started July 2023  She saw Dr. Robbins (neuro) 8/10/23  for trigeminal nerve zoster    Trigeminal Nerve Zoster  -Continue to monitor clinically; expected to continue to slowly gradually improve, but unknown if numbness will completely resolve.  -She will see her ophthalmologist for brief monocular diplopia  -Will fax this record to her ophthalmologist, Endocrinologist, and PCP as she requested as they are outside [facility] system.     RTC: 6-8 months    She still has some numbness that was improving in her right face  F/u scheduled this week Thursday at [facility] February 2024        Recall previously labs showed that she is a carrier for hemochromatosis       She is following w/ Dr. Bob Grimes (ortho) for L rotator cuff injury   Lov with IRVING Cardenas 4/11/23          Continues on pravachol 20mg daily for cholesterol       No longer on zoloft 50mg 
Medicare Annual Wellness Visit    Sofie Marin is here for Medicare AWV    Assessment & Plan   Primary hypertension  -     Comprehensive Metabolic Panel; Future  -     Hemoglobin A1C; Future  -     Lipid Panel; Future  -     PTH, Intact; Future  Medicare annual wellness visit, subsequent  Recurrent major depressive disorder, in partial remission (HCC)  Severe obesity (BMI 35.0-39.9) with comorbidity (HCC)  Controlled type 2 diabetes mellitus with complication, without long-term current use of insulin (HCC)  -     Comprehensive Metabolic Panel; Future  -     Hemoglobin A1C; Future  -     Lipid Panel; Future  -     PTH, Intact; Future  Hyperthyroidism  Trigeminal neuralgia of right side of face  -     Comprehensive Metabolic Panel; Future  -     Hemoglobin A1C; Future  -     Lipid Panel; Future  -     PTH, Intact; Future  Hypercalcemia  -     Comprehensive Metabolic Panel; Future  -     Hemoglobin A1C; Future  -     Lipid Panel; Future  -     PTH, Intact; Future     Recommendations for Preventive Services Due: see orders and patient instructions/AVS.  Recommended screening schedule for the next 5-10 years is provided to the patient in written form: see Patient Instructions/AVS.     Return in about 6 months (around 8/20/2024).     Subjective       Patient's complete Health Risk Assessment and screening values have been reviewed and are found in Flowsheets. The following problems were reviewed today and where indicated follow up appointments were made and/or referrals ordered.    Positive Risk Factor Screenings with Interventions:                Activity, Diet, and Weight:  On average, how many days per week do you engage in moderate to strenuous exercise (like a brisk walk)?: 3 days  On average, how many minutes do you engage in exercise at this level?: 20 min    Do you eat balanced/healthy meals regularly?: Yes    Body mass index is 35.98 kg/m². (!) Abnormal    Obesity Interventions:  On trulicity                  
\"Have you been to the ER, urgent care clinic since your last visit?  Hospitalized since your last visit?\"    NO    “Have you seen or consulted any other health care providers outside of [facility] since your last visit?”    NO           
MG per tablet lisinopril 10 mg-hydrochlorothiazide 12.5 mg tablet   Prescribed by non [facility] MD (Patient not taking: Reported on 6/28/2023)      fluticasone (FLONASE) 50 MCG/ACT nasal spray 1 spray by Each Nostril route daily 16 g 0    aspirin 81 MG EC tablet Take by mouth daily      Cholecalciferol 50 MCG (2000 UT) CAPS Take by mouth daily      cyanocobalamin 100 MCG tablet Take by mouth daily      methIMAzole (TAPAZOLE) 5 MG tablet Take by mouth (Patient not taking: Reported on 8/28/2023)       No current facility-administered medications for this visit.     Past Surgical History:   Procedure Laterality Date    BREAST BIOPSY Left Years ago    Benign surgical biopsy    CATARACT REMOVAL Bilateral 02/2022    COLONOSCOPY N/A 08/11/2016    COLONOSCOPY performed by Jhon Hernandez MD at [facility] ENDOSCOPY    HEENT  2021    THYROID BIOPSY    TUBAL LIGATION      US ASP BREAST CYST LEFT Left Years ago    Benign         Lab Results   Component Value Date    WBC 8.5 11/02/2022    HGB 14.0 11/02/2022    HCT 43.6 11/02/2022    MCV 94.4 11/02/2022     11/02/2022     Lab Results   Component Value Date    CHOL 138 11/02/2022    TRIG 176 (H) 11/02/2022    HDL 45 11/02/2022    LDLCALC 57.8 11/02/2022     Lab Results   Component Value Date    CREATININE 0.81 08/28/2023    BUN 17 08/28/2023     08/28/2023    K 4.9 08/28/2023     08/28/2023    CO2 27 08/28/2023       Review of Systems   Respiratory:  Negative for shortness of breath.    Cardiovascular:  Negative for chest pain.         Physical Exam  Constitutional:       General: She is not in acute distress.     Appearance: She is not ill-appearing, toxic-appearing or diaphoretic.   HENT:      Head: Normocephalic and atraumatic.   Eyes:      General:         Right eye: No discharge.         Left eye: No discharge.      Extraocular Movements: Extraocular movements intact.   Cardiovascular:      Rate and Rhythm: Normal rate and regular rhythm.      Heart sounds: No

## 2024-02-21 LAB
ALBUMIN SERPL-MCNC: 4 G/DL (ref 3.5–5)
ALBUMIN/GLOB SERPL: 1.3 (ref 1.1–2.2)
ALP SERPL-CCNC: 93 U/L (ref 45–117)
ALT SERPL-CCNC: 36 U/L (ref 12–78)
ANION GAP SERPL CALC-SCNC: 2 MMOL/L (ref 5–15)
AST SERPL-CCNC: 23 U/L (ref 15–37)
BILIRUB SERPL-MCNC: 0.7 MG/DL (ref 0.2–1)
BUN SERPL-MCNC: 12 MG/DL (ref 6–20)
BUN/CREAT SERPL: 13 (ref 12–20)
CALCIUM SERPL-MCNC: 10.5 MG/DL (ref 8.5–10.1)
CALCIUM SERPL-MCNC: 10.8 MG/DL (ref 8.5–10.1)
CHLORIDE SERPL-SCNC: 107 MMOL/L (ref 97–108)
CHOLEST SERPL-MCNC: 140 MG/DL
CO2 SERPL-SCNC: 30 MMOL/L (ref 21–32)
CREAT SERPL-MCNC: 0.94 MG/DL (ref 0.55–1.02)
EST. AVERAGE GLUCOSE BLD GHB EST-MCNC: 105 MG/DL
GLOBULIN SER CALC-MCNC: 3.2 G/DL (ref 2–4)
GLUCOSE SERPL-MCNC: 101 MG/DL (ref 65–100)
HBA1C MFR BLD: 5.3 % (ref 4–5.6)
HDLC SERPL-MCNC: 42 MG/DL
HDLC SERPL: 3.3 (ref 0–5)
LDLC SERPL CALC-MCNC: 63.8 MG/DL (ref 0–100)
POTASSIUM SERPL-SCNC: 5.2 MMOL/L (ref 3.5–5.1)
PROT SERPL-MCNC: 7.2 G/DL (ref 6.4–8.2)
PTH-INTACT SERPL-MCNC: 71.6 PG/ML (ref 18.4–88)
SODIUM SERPL-SCNC: 139 MMOL/L (ref 136–145)
TRIGL SERPL-MCNC: 171 MG/DL
VLDLC SERPL CALC-MCNC: 34.2 MG/DL

## 2024-02-22 NOTE — RESULT ENCOUNTER NOTE
Received callback from pt  Received two pt identifiers  Pt informed per Dr. Grimes persistent mild to moderate calcium elevation be sure not to take any extra calcium supplements  Pt states has follow up appt with Dr. Black in May  Results faxed with confirmation  Pt verbalizes understanding of the instructions and has no further questions at this time.

## 2024-03-04 RX ORDER — METOPROLOL TARTRATE 50 MG/1
50 TABLET, FILM COATED ORAL 2 TIMES DAILY
Qty: 180 TABLET | Refills: 1 | Status: SHIPPED | OUTPATIENT
Start: 2024-03-04

## 2024-04-01 RX ORDER — PRAVASTATIN SODIUM 20 MG
20 TABLET ORAL DAILY
Qty: 90 TABLET | Refills: 1 | Status: SHIPPED | OUTPATIENT
Start: 2024-04-01

## 2024-04-03 ENCOUNTER — TRANSCRIBE ORDERS (OUTPATIENT)
Facility: HOSPITAL | Age: 69
End: 2024-04-03

## 2024-04-03 DIAGNOSIS — Z12.31 ENCOUNTER FOR SCREENING MAMMOGRAM FOR MALIGNANT NEOPLASM OF BREAST: Primary | ICD-10-CM

## 2024-04-30 ENCOUNTER — HOSPITAL ENCOUNTER (OUTPATIENT)
Facility: HOSPITAL | Age: 69
Discharge: HOME | End: 2024-05-03
Attending: INTERNAL MEDICINE
Payer: MEDICARE

## 2024-04-30 VITALS — WEIGHT: 258 LBS | HEIGHT: 71 IN | BODY MASS INDEX: 36.12 KG/M2

## 2024-04-30 DIAGNOSIS — Z12.31 ENCOUNTER FOR SCREENING MAMMOGRAM FOR MALIGNANT NEOPLASM OF BREAST: ICD-10-CM

## 2024-04-30 PROCEDURE — 77063 BREAST TOMOSYNTHESIS BI: CPT

## 2024-05-13 LAB
ALBUMIN: NORMAL
ALP BLD-CCNC: NORMAL U/L
ALT SERPL-CCNC: NORMAL U/L
ANION GAP SERPL CALCULATED.3IONS-SCNC: NORMAL MMOL/L
AST SERPL-CCNC: NORMAL U/L
BILIRUB SERPL-MCNC: NORMAL MG/DL
BUN BLDV-MCNC: NORMAL MG/DL
CALCIUM SERPL-MCNC: NORMAL MG/DL
CHLORIDE BLD-SCNC: NORMAL MMOL/L
CHOLESTEROL, TOTAL: NORMAL
CHOLESTEROL/HDL RATIO: NORMAL
CO2: NORMAL
CREAT SERPL-MCNC: NORMAL MG/DL
ESTIMATED AVERAGE GLUCOSE: NORMAL
GFR, ESTIMATED: 71
GLUCOSE BLD-MCNC: NORMAL MG/DL
HBA1C MFR BLD: 5.8 %
HDLC SERPL-MCNC: NORMAL MG/DL
LDL CHOLESTEROL: 61
NONHDLC SERPL-MCNC: NORMAL MG/DL
POTASSIUM SERPL-SCNC: NORMAL MMOL/L
SODIUM BLD-SCNC: NORMAL MMOL/L
TOTAL PROTEIN: NORMAL
TRIGL SERPL-MCNC: NORMAL MG/DL
VLDLC SERPL CALC-MCNC: NORMAL MG/DL

## 2024-08-11 RX ORDER — METFORMIN HYDROCHLORIDE 500 MG/1
1000 TABLET, EXTENDED RELEASE ORAL 2 TIMES DAILY
Qty: 120 TABLET | Refills: 0 | Status: SHIPPED | OUTPATIENT
Start: 2024-08-11

## 2024-08-13 NOTE — PROGRESS NOTES
HISTORY OF PRESENT ILLNESS  Sofie Marin is a 69 y.o. female.  HPI  Last here 2/20/24.  Pt is here for routine care.      Has history of hypertension  BP today is 152/75 will recheck 130/80  Home BP: 140/?  She thinks it has been running in the 130s 140s systolic  Continues on metoprolol 50mg BID and lisinopril 20 mg. She took these today   Recall she was previously on lisinopril-hctz 10-12.5     She is diabetic    BS: 99 109 115   Continues on metformin 500mg BID (4 tablets gave her diarrhea)  She is taking trulicity 1.5 mg weekly wants to consider increasing it I discussed increasing to 3 mg she would like to wait and discuss it with her endocrinologist first  No issues with diarrhea currently  Recall increased metformin caused diarrhea and ozempic was too expensive        She is following with Dr. Black (endo) for hyperthyroidism and diabetes  Lov 5/24, tried jardiance, too expensive, follow-up scheduled 11/24  Continues on Tapazole 5mg 4 times per week   She is taking trulicity 1.5 mg weekly per Dr. Black         Wt today is 258 lbs, stable since lov   Discussed diet and w/l    Taking trulicity 1.5 mg weekly she claims to go back on track discussed increasing dose which she would like to consider next time     Reviewed labs  Ordered labs            She was sent to see neurology at [facility] for right facial trigeminal neuralgia felt to be from shingles started July 2023  She saw Dr. Robbins (neuro) 2/22/24 for trigeminal nerve zoster  Discharge from care           Recall previously labs showed that she is a carrier for hemochromatosis        She is following w/ Dr. Bob Grimes (ortho) for L rotator cuff injury   Lov with IRVING Cardenas 4/11/23     Had skin check 7/24 [facility]         Continues on pravachol 20mg daily for cholesterol       No longer on zoloft 50mg after a rash, doing well - no issues with depression/anxiety       Pt lives w/ her        ACP not on file. SDMs are her

## 2024-08-21 ENCOUNTER — OFFICE VISIT (OUTPATIENT)
Age: 69
End: 2024-08-21
Payer: MEDICARE

## 2024-08-21 VITALS
OXYGEN SATURATION: 98 % | WEIGHT: 258 LBS | DIASTOLIC BLOOD PRESSURE: 80 MMHG | RESPIRATION RATE: 20 BRPM | TEMPERATURE: 98 F | SYSTOLIC BLOOD PRESSURE: 130 MMHG | BODY MASS INDEX: 36.12 KG/M2 | HEART RATE: 62 BPM | HEIGHT: 71 IN

## 2024-08-21 DIAGNOSIS — E55.9 VITAMIN D DEFICIENCY: ICD-10-CM

## 2024-08-21 DIAGNOSIS — E78.00 HYPERCHOLESTEROLEMIA: ICD-10-CM

## 2024-08-21 DIAGNOSIS — G50.0 TRIGEMINAL NEURALGIA OF RIGHT SIDE OF FACE: ICD-10-CM

## 2024-08-21 DIAGNOSIS — F33.41 RECURRENT MAJOR DEPRESSIVE DISORDER, IN PARTIAL REMISSION (HCC): ICD-10-CM

## 2024-08-21 DIAGNOSIS — E05.90 HYPERTHYROIDISM: ICD-10-CM

## 2024-08-21 DIAGNOSIS — E66.09 CLASS 1 OBESITY DUE TO EXCESS CALORIES WITH SERIOUS COMORBIDITY AND BODY MASS INDEX (BMI) OF 34.0 TO 34.9 IN ADULT: ICD-10-CM

## 2024-08-21 DIAGNOSIS — E83.52 HYPERCALCEMIA: ICD-10-CM

## 2024-08-21 DIAGNOSIS — I10 PRIMARY HYPERTENSION: Primary | ICD-10-CM

## 2024-08-21 DIAGNOSIS — E53.8 B12 DEFICIENCY: ICD-10-CM

## 2024-08-21 DIAGNOSIS — E11.8 CONTROLLED TYPE 2 DIABETES MELLITUS WITH COMPLICATION, WITHOUT LONG-TERM CURRENT USE OF INSULIN (HCC): ICD-10-CM

## 2024-08-21 PROCEDURE — 99214 OFFICE O/P EST MOD 30 MIN: CPT | Performed by: INTERNAL MEDICINE

## 2024-08-21 PROCEDURE — 3078F DIAST BP <80 MM HG: CPT | Performed by: INTERNAL MEDICINE

## 2024-08-21 PROCEDURE — 1123F ACP DISCUSS/DSCN MKR DOCD: CPT | Performed by: INTERNAL MEDICINE

## 2024-08-21 PROCEDURE — G8427 DOCREV CUR MEDS BY ELIG CLIN: HCPCS | Performed by: INTERNAL MEDICINE

## 2024-08-21 PROCEDURE — 1090F PRES/ABSN URINE INCON ASSESS: CPT | Performed by: INTERNAL MEDICINE

## 2024-08-21 PROCEDURE — 2022F DILAT RTA XM EVC RTNOPTHY: CPT | Performed by: INTERNAL MEDICINE

## 2024-08-21 PROCEDURE — G8417 CALC BMI ABV UP PARAM F/U: HCPCS | Performed by: INTERNAL MEDICINE

## 2024-08-21 PROCEDURE — 1036F TOBACCO NON-USER: CPT | Performed by: INTERNAL MEDICINE

## 2024-08-21 PROCEDURE — 3017F COLORECTAL CA SCREEN DOC REV: CPT | Performed by: INTERNAL MEDICINE

## 2024-08-21 PROCEDURE — G8399 PT W/DXA RESULTS DOCUMENT: HCPCS | Performed by: INTERNAL MEDICINE

## 2024-08-21 PROCEDURE — 3075F SYST BP GE 130 - 139MM HG: CPT | Performed by: INTERNAL MEDICINE

## 2024-08-21 PROCEDURE — 3044F HG A1C LEVEL LT 7.0%: CPT | Performed by: INTERNAL MEDICINE

## 2024-08-21 RX ORDER — LANCETS 33 GAUGE
EACH MISCELLANEOUS
Qty: 1 EACH | Refills: 0 | Status: SHIPPED | OUTPATIENT
Start: 2024-08-21

## 2024-08-21 RX ORDER — METOPROLOL TARTRATE 50 MG/1
50 TABLET, FILM COATED ORAL 2 TIMES DAILY
Qty: 180 TABLET | Refills: 1 | Status: SHIPPED | OUTPATIENT
Start: 2024-08-21

## 2024-08-21 RX ORDER — METFORMIN HYDROCHLORIDE 500 MG/1
1000 TABLET, EXTENDED RELEASE ORAL
Qty: 180 TABLET | Refills: 0 | Status: SHIPPED | OUTPATIENT
Start: 2024-08-21

## 2024-08-21 RX ORDER — LISINOPRIL 20 MG/1
20 TABLET ORAL DAILY
Qty: 90 TABLET | Refills: 1 | Status: SHIPPED | OUTPATIENT
Start: 2024-08-21

## 2024-08-21 RX ORDER — GLUCOSAMINE HCL/CHONDROITIN SU 500-400 MG
CAPSULE ORAL
Qty: 100 STRIP | Refills: 0 | Status: SHIPPED | OUTPATIENT
Start: 2024-08-21

## 2024-08-21 RX ORDER — PRAVASTATIN SODIUM 20 MG
20 TABLET ORAL DAILY
Qty: 90 TABLET | Refills: 1 | Status: SHIPPED | OUTPATIENT
Start: 2024-08-21

## 2024-08-21 ASSESSMENT — PATIENT HEALTH QUESTIONNAIRE - PHQ9
1. LITTLE INTEREST OR PLEASURE IN DOING THINGS: NOT AT ALL
SUM OF ALL RESPONSES TO PHQ QUESTIONS 1-9: 0
SUM OF ALL RESPONSES TO PHQ9 QUESTIONS 1 & 2: 0
SUM OF ALL RESPONSES TO PHQ QUESTIONS 1-9: 0
2. FEELING DOWN, DEPRESSED OR HOPELESS: NOT AT ALL

## 2024-08-22 LAB
25(OH)D3 SERPL-MCNC: 59.4 NG/ML (ref 30–100)
ANION GAP SERPL CALC-SCNC: 3 MMOL/L (ref 5–15)
BUN SERPL-MCNC: 14 MG/DL (ref 6–20)
BUN/CREAT SERPL: 17 (ref 12–20)
CALCIUM SERPL-MCNC: 10.7 MG/DL (ref 8.5–10.1)
CHLORIDE SERPL-SCNC: 109 MMOL/L (ref 97–108)
CO2 SERPL-SCNC: 28 MMOL/L (ref 21–32)
CREAT SERPL-MCNC: 0.81 MG/DL (ref 0.55–1.02)
CREAT UR-MCNC: 147 MG/DL
ERYTHROCYTE [DISTWIDTH] IN BLOOD BY AUTOMATED COUNT: 12 % (ref 11.5–14.5)
EST. AVERAGE GLUCOSE BLD GHB EST-MCNC: 114 MG/DL
GLUCOSE SERPL-MCNC: 104 MG/DL (ref 65–100)
HBA1C MFR BLD: 5.6 % (ref 4–5.6)
HCT VFR BLD AUTO: 44.3 % (ref 35–47)
HGB BLD-MCNC: 14.4 G/DL (ref 11.5–16)
MCH RBC QN AUTO: 30.1 PG (ref 26–34)
MCHC RBC AUTO-ENTMCNC: 32.5 G/DL (ref 30–36.5)
MCV RBC AUTO: 92.5 FL (ref 80–99)
MICROALBUMIN UR-MCNC: 2.12 MG/DL
MICROALBUMIN/CREAT UR-RTO: 14 MG/G (ref 0–30)
NRBC # BLD: 0 K/UL (ref 0–0.01)
NRBC BLD-RTO: 0 PER 100 WBC
PLATELET # BLD AUTO: 206 K/UL (ref 150–400)
PMV BLD AUTO: 11 FL (ref 8.9–12.9)
POTASSIUM SERPL-SCNC: 4.7 MMOL/L (ref 3.5–5.1)
RBC # BLD AUTO: 4.79 M/UL (ref 3.8–5.2)
SODIUM SERPL-SCNC: 140 MMOL/L (ref 136–145)
SPECIMEN HOLD: NORMAL
VIT B12 SERPL-MCNC: 1064 PG/ML (ref 193–986)
WBC # BLD AUTO: 8.1 K/UL (ref 3.6–11)

## 2024-08-26 ENCOUNTER — TELEPHONE (OUTPATIENT)
Age: 69
End: 2024-08-26

## 2024-11-19 LAB
CHOLESTEROL, TOTAL: 147 MG/DL
CHOLESTEROL/HDL RATIO: NORMAL
ESTIMATED AVERAGE GLUCOSE: NORMAL
GFR, EXTERNAL: 75
HBA1C MFR BLD: 6 %
HDLC SERPL-MCNC: 43 MG/DL (ref 35–70)
LDL CHOLESTEROL: 77
NONHDLC SERPL-MCNC: NORMAL MG/DL
TRIGL SERPL-MCNC: 158 MG/DL
VLDLC SERPL CALC-MCNC: 27 MG/DL

## 2024-12-06 RX ORDER — METFORMIN HYDROCHLORIDE 500 MG/1
1000 TABLET, EXTENDED RELEASE ORAL
Qty: 180 TABLET | Refills: 0 | Status: SHIPPED | OUTPATIENT
Start: 2024-12-06

## 2025-02-10 RX ORDER — METOPROLOL TARTRATE 50 MG
50 TABLET ORAL 2 TIMES DAILY
Qty: 60 TABLET | Refills: 0 | Status: SHIPPED | OUTPATIENT
Start: 2025-02-10

## 2025-02-18 NOTE — PROGRESS NOTES
HISTORY OF PRESENT ILLNESS  Sofie Marin is a 69 y.o. female.  HPI  Last here 8/21/24.  Pt is here for routine care.    She notes trigger finger on R hand after using that hand to catch her fall. Referred to hand surgeon, she will only go if the sx worsen not interested in intervention for now    She notes if she stands for a long period of time she has burning especially in her L leg. Declines gabapentin not bothering her much working on position changes      Has history of hypertension  BP today is 138/79  Home BP: Does not remember readings  Continues on metoprolol 50mg BID and lisinopril 20 mg. She took these today   Recall she was previously on lisinopril-hctz 10-12.5     She is diabetic    BS:  110-120s lowest 75 highest 140   Continues on metformin 500mg QD (4 tablets gave her diarrhea)  She is taking trulicity 0.75 mg weekly, just increased to 1.5mg weekly will start this next week previously had stated she was already on the 1.5mg dose but she was on the 0.75 dose the whole time had been on the 1.5 at one  point but was unable to get that dose  No issues with diarrhea currently  Recall increased metformin caused diarrhea and ozempic was too expensive        She is following with Dr. Black (endo) for hyperthyroidism and diabetes  Lov 11/24, f/u 5/25   Jardiance was too expensive   Continues on Tapazole 5mg 5 times per week this was increased after TSH was suppressed on November labs previously was 4 times per week  She is taking trulicity 0.75 mg weekly per Dr. Black, increasing to 1.5mg weekly again         Wt today is 262 lbs, up 4 lbs since lov   Discussed diet and w/l    Taking trulicity 0.75 mg weekly increasing to 1.5mg weekly   Plans to get back on track her brother passed away this month 2/25      Reviewed labs Sofia 11/24 gfr 75 cr 0.84 lfts nl hg nl vit d nl tsh suppressed tapazole was increased at that time   Ordered labs         She was sent to see neurology at [facility] for right facial

## 2025-02-23 NOTE — PROGRESS NOTES
Medicare Annual Wellness Visit    Sofie Marin is here for Medicare AWV    Assessment & Plan   Primary hypertension  -     Comprehensive Metabolic Panel; Future  -     Hemoglobin A1C; Future  -     Lipid Panel; Future  -     Albumin/Creatinine Ratio, Urine; Future  -     Vitamin B12; Future  Controlled type 2 diabetes mellitus with complication, without long-term current use of insulin (HCC)  -     Comprehensive Metabolic Panel; Future  -     Hemoglobin A1C; Future  Recurrent major depressive disorder, in partial remission  Hyperthyroidism  Trigeminal neuralgia of right side of face  Hypercholesterolemia  -     Comprehensive Metabolic Panel; Future  -     Hemoglobin A1C; Future  -     Lipid Panel; Future  -     Albumin/Creatinine Ratio, Urine; Future  -     Vitamin B12; Future  Class 1 obesity due to excess calories with serious comorbidity and body mass index (BMI) of 34.0 to 34.9 in adult  Medicare annual wellness visit, subsequent  -     DEXA BONE DENSITY AXIAL SKELETON; Future  -     KAREN NATAN DIGITAL SCREEN BILATERAL; Future  -     Comprehensive Metabolic Panel; Future  -     Hemoglobin A1C; Future  -     Lipid Panel; Future  -     Albumin/Creatinine Ratio, Urine; Future  -     Vitamin B12; Future  B12 deficiency  -     Comprehensive Metabolic Panel; Future  -     Hemoglobin A1C; Future  -     Lipid Panel; Future  -     Albumin/Creatinine Ratio, Urine; Future  -     Vitamin B12; Future  Post-menopausal  -     DEXA BONE DENSITY AXIAL SKELETON; Future        Return in about 6 months (around 8/26/2025).     Subjective       Patient's complete Health Risk Assessment and screening values have been reviewed and are found in Flowsheets. The following problems were reviewed today and where indicated follow up appointments were made and/or referrals ordered.    Positive Risk Factor Screenings with Interventions:              Inactivity:  On average, how many days per week do you engage in moderate to strenuous exercise

## 2025-02-23 NOTE — PATIENT INSTRUCTIONS
Starting a Weight-Loss Plan: Care Instructions  Overview    It can be a challenge to lose weight. But your doctor can help you make a weight-loss plan that meets your needs.  You don't have to make a lot of big changes at once. A better idea might be to focus on small changes and stick with them. When those changes become habit, you can add a few more changes.  Some people find it helpful to take an exercise or nutrition class. If you have questions, ask your doctor about seeing a registered dietitian or an exercise specialist. You might also think about joining a weight-loss support group.  If you're not ready to make changes right now, try to pick a date in the future. Then make an appointment with your doctor to talk about when and how you'll get started with a plan.  Follow-up care is a key part of your treatment and safety. Be sure to make and go to all appointments, and call your doctor if you are having problems. It's also a good idea to know your test results and keep a list of the medicines you take.  How can you care for yourself as you start a weight-loss plan?  Set realistic goals. Many people expect to lose much more weight than is likely. A weight loss of 5% to 10% of your body weight may be enough to improve your health.  Get family and friends involved to provide support. Talk to them about why you are trying to lose weight, and ask them to help. They can help by participating in exercise and having meals with you, even if they may be eating something different.  Find what works best for you. If you do not have time or do not like to cook, a program that offers meal replacement bars or shakes may be better for you. Or if you like to prepare meals, finding a plan that includes daily menus and recipes may be best.  Ask your doctor about other health professionals who can help you achieve your weight-loss goals.  A dietitian can help you make healthy changes in your diet.  An exercise specialist or  Never

## 2025-02-25 SDOH — ECONOMIC STABILITY: FOOD INSECURITY: WITHIN THE PAST 12 MONTHS, YOU WORRIED THAT YOUR FOOD WOULD RUN OUT BEFORE YOU GOT MONEY TO BUY MORE.: NEVER TRUE

## 2025-02-25 SDOH — ECONOMIC STABILITY: INCOME INSECURITY: IN THE LAST 12 MONTHS, WAS THERE A TIME WHEN YOU WERE NOT ABLE TO PAY THE MORTGAGE OR RENT ON TIME?: NO

## 2025-02-25 SDOH — ECONOMIC STABILITY: FOOD INSECURITY: WITHIN THE PAST 12 MONTHS, THE FOOD YOU BOUGHT JUST DIDN'T LAST AND YOU DIDN'T HAVE MONEY TO GET MORE.: NEVER TRUE

## 2025-02-25 SDOH — ECONOMIC STABILITY: TRANSPORTATION INSECURITY
IN THE PAST 12 MONTHS, HAS THE LACK OF TRANSPORTATION KEPT YOU FROM MEDICAL APPOINTMENTS OR FROM GETTING MEDICATIONS?: NO

## 2025-02-25 SDOH — HEALTH STABILITY: PHYSICAL HEALTH: ON AVERAGE, HOW MANY MINUTES DO YOU ENGAGE IN EXERCISE AT THIS LEVEL?: 30 MIN

## 2025-02-25 SDOH — HEALTH STABILITY: PHYSICAL HEALTH: ON AVERAGE, HOW MANY DAYS PER WEEK DO YOU ENGAGE IN MODERATE TO STRENUOUS EXERCISE (LIKE A BRISK WALK)?: 2 DAYS

## 2025-02-25 SDOH — ECONOMIC STABILITY: TRANSPORTATION INSECURITY
IN THE PAST 12 MONTHS, HAS LACK OF TRANSPORTATION KEPT YOU FROM MEETINGS, WORK, OR FROM GETTING THINGS NEEDED FOR DAILY LIVING?: NO

## 2025-02-25 ASSESSMENT — PATIENT HEALTH QUESTIONNAIRE - PHQ9
5. POOR APPETITE OR OVEREATING: NOT AT ALL
6. FEELING BAD ABOUT YOURSELF - OR THAT YOU ARE A FAILURE OR HAVE LET YOURSELF OR YOUR FAMILY DOWN: NOT AT ALL
SUM OF ALL RESPONSES TO PHQ QUESTIONS 1-9: 0
1. LITTLE INTEREST OR PLEASURE IN DOING THINGS: NOT AT ALL
3. TROUBLE FALLING OR STAYING ASLEEP: NOT AT ALL
SUM OF ALL RESPONSES TO PHQ QUESTIONS 1-9: 0
10. IF YOU CHECKED OFF ANY PROBLEMS, HOW DIFFICULT HAVE THESE PROBLEMS MADE IT FOR YOU TO DO YOUR WORK, TAKE CARE OF THINGS AT HOME, OR GET ALONG WITH OTHER PEOPLE: NOT DIFFICULT AT ALL
8. MOVING OR SPEAKING SO SLOWLY THAT OTHER PEOPLE COULD HAVE NOTICED. OR THE OPPOSITE, BEING SO FIGETY OR RESTLESS THAT YOU HAVE BEEN MOVING AROUND A LOT MORE THAN USUAL: NOT AT ALL
SUM OF ALL RESPONSES TO PHQ QUESTIONS 1-9: 0
SUM OF ALL RESPONSES TO PHQ QUESTIONS 1-9: 0
4. FEELING TIRED OR HAVING LITTLE ENERGY: NOT AT ALL
SUM OF ALL RESPONSES TO PHQ9 QUESTIONS 1 & 2: 0
9. THOUGHTS THAT YOU WOULD BE BETTER OFF DEAD, OR OF HURTING YOURSELF: NOT AT ALL
7. TROUBLE CONCENTRATING ON THINGS, SUCH AS READING THE NEWSPAPER OR WATCHING TELEVISION: NOT AT ALL
2. FEELING DOWN, DEPRESSED OR HOPELESS: NOT AT ALL

## 2025-02-25 ASSESSMENT — LIFESTYLE VARIABLES
HOW MANY STANDARD DRINKS CONTAINING ALCOHOL DO YOU HAVE ON A TYPICAL DAY: 1
HOW OFTEN DO YOU HAVE A DRINK CONTAINING ALCOHOL: 4
HOW OFTEN DO YOU HAVE SIX OR MORE DRINKS ON ONE OCCASION: 1
HOW MANY STANDARD DRINKS CONTAINING ALCOHOL DO YOU HAVE ON A TYPICAL DAY: 1 OR 2
HOW OFTEN DO YOU HAVE A DRINK CONTAINING ALCOHOL: 2-3 TIMES A WEEK

## 2025-02-26 ENCOUNTER — OFFICE VISIT (OUTPATIENT)
Age: 70
End: 2025-02-26
Payer: MEDICARE

## 2025-02-26 VITALS
SYSTOLIC BLOOD PRESSURE: 138 MMHG | TEMPERATURE: 97 F | OXYGEN SATURATION: 97 % | RESPIRATION RATE: 17 BRPM | BODY MASS INDEX: 36.7 KG/M2 | WEIGHT: 262.13 LBS | DIASTOLIC BLOOD PRESSURE: 79 MMHG | HEIGHT: 71 IN | HEART RATE: 67 BPM

## 2025-02-26 DIAGNOSIS — Z00.00 MEDICARE ANNUAL WELLNESS VISIT, SUBSEQUENT: ICD-10-CM

## 2025-02-26 DIAGNOSIS — Z78.0 POST-MENOPAUSAL: ICD-10-CM

## 2025-02-26 DIAGNOSIS — F33.41 RECURRENT MAJOR DEPRESSIVE DISORDER, IN PARTIAL REMISSION: ICD-10-CM

## 2025-02-26 DIAGNOSIS — I10 PRIMARY HYPERTENSION: Primary | ICD-10-CM

## 2025-02-26 DIAGNOSIS — E05.90 HYPERTHYROIDISM: ICD-10-CM

## 2025-02-26 DIAGNOSIS — E66.09 CLASS 1 OBESITY DUE TO EXCESS CALORIES WITH SERIOUS COMORBIDITY AND BODY MASS INDEX (BMI) OF 34.0 TO 34.9 IN ADULT: ICD-10-CM

## 2025-02-26 DIAGNOSIS — E78.00 HYPERCHOLESTEROLEMIA: ICD-10-CM

## 2025-02-26 DIAGNOSIS — E11.8 CONTROLLED TYPE 2 DIABETES MELLITUS WITH COMPLICATION, WITHOUT LONG-TERM CURRENT USE OF INSULIN (HCC): ICD-10-CM

## 2025-02-26 DIAGNOSIS — E66.811 CLASS 1 OBESITY DUE TO EXCESS CALORIES WITH SERIOUS COMORBIDITY AND BODY MASS INDEX (BMI) OF 34.0 TO 34.9 IN ADULT: ICD-10-CM

## 2025-02-26 DIAGNOSIS — E53.8 B12 DEFICIENCY: ICD-10-CM

## 2025-02-26 DIAGNOSIS — G50.0 TRIGEMINAL NEURALGIA OF RIGHT SIDE OF FACE: ICD-10-CM

## 2025-02-26 PROCEDURE — 99214 OFFICE O/P EST MOD 30 MIN: CPT | Performed by: INTERNAL MEDICINE

## 2025-02-26 NOTE — PROGRESS NOTES
\"Have you been to the ER, urgent care clinic since your last visit?  Hospitalized since your last visit?\"    NO    “Have you seen or consulted any other health care providers outside our system since your last visit?”    Endocrinology 11/2024

## 2025-02-27 LAB
ALBUMIN SERPL-MCNC: 4.1 G/DL (ref 3.5–5)
ALBUMIN/GLOB SERPL: 1.3 (ref 1.1–2.2)
ALP SERPL-CCNC: 82 U/L (ref 45–117)
ALT SERPL-CCNC: 38 U/L (ref 12–78)
ANION GAP SERPL CALC-SCNC: 3 MMOL/L (ref 2–12)
AST SERPL-CCNC: 26 U/L (ref 15–37)
BILIRUB SERPL-MCNC: 0.5 MG/DL (ref 0.2–1)
BUN SERPL-MCNC: 16 MG/DL (ref 6–20)
BUN/CREAT SERPL: 19 (ref 12–20)
CALCIUM SERPL-MCNC: 10.4 MG/DL (ref 8.5–10.1)
CHLORIDE SERPL-SCNC: 108 MMOL/L (ref 97–108)
CHOLEST SERPL-MCNC: 156 MG/DL
CO2 SERPL-SCNC: 27 MMOL/L (ref 21–32)
CREAT SERPL-MCNC: 0.83 MG/DL (ref 0.55–1.02)
CREAT UR-MCNC: 140 MG/DL
EST. AVERAGE GLUCOSE BLD GHB EST-MCNC: 117 MG/DL
GLOBULIN SER CALC-MCNC: 3.2 G/DL (ref 2–4)
GLUCOSE SERPL-MCNC: 113 MG/DL (ref 65–100)
HBA1C MFR BLD: 5.7 % (ref 4–5.6)
HDLC SERPL-MCNC: 45 MG/DL
HDLC SERPL: 3.5 (ref 0–5)
LDLC SERPL CALC-MCNC: 82.6 MG/DL (ref 0–100)
MICROALBUMIN UR-MCNC: 1.83 MG/DL
MICROALBUMIN/CREAT UR-RTO: 13 MG/G (ref 0–30)
POTASSIUM SERPL-SCNC: 4.9 MMOL/L (ref 3.5–5.1)
PROT SERPL-MCNC: 7.3 G/DL (ref 6.4–8.2)
SODIUM SERPL-SCNC: 138 MMOL/L (ref 136–145)
SPECIMEN HOLD: NORMAL
TRIGL SERPL-MCNC: 142 MG/DL
VIT B12 SERPL-MCNC: 921 PG/ML (ref 193–986)
VLDLC SERPL CALC-MCNC: 28.4 MG/DL

## 2025-03-07 RX ORDER — METOPROLOL TARTRATE 50 MG
50 TABLET ORAL 2 TIMES DAILY
Qty: 180 TABLET | Refills: 1 | Status: SHIPPED | OUTPATIENT
Start: 2025-03-07

## 2025-03-10 RX ORDER — METFORMIN HYDROCHLORIDE 500 MG/1
1000 TABLET, EXTENDED RELEASE ORAL
Qty: 180 TABLET | Refills: 0 | Status: SHIPPED | OUTPATIENT
Start: 2025-03-10

## 2025-03-18 RX ORDER — PRAVASTATIN SODIUM 20 MG
20 TABLET ORAL DAILY
Qty: 90 TABLET | Refills: 1 | Status: SHIPPED | OUTPATIENT
Start: 2025-03-18

## 2025-05-13 LAB
HBA1C MFR BLD HPLC: 5.7 %
LDL CHOLESTEROL, EXTERNAL: 77

## 2025-05-20 ENCOUNTER — TRANSCRIBE ORDERS (OUTPATIENT)
Facility: HOSPITAL | Age: 70
End: 2025-05-20

## 2025-05-20 DIAGNOSIS — Z12.31 ENCOUNTER FOR SCREENING MAMMOGRAM FOR MALIGNANT NEOPLASM OF BREAST: Primary | ICD-10-CM

## 2025-05-29 ENCOUNTER — HOSPITAL ENCOUNTER (OUTPATIENT)
Facility: HOSPITAL | Age: 70
End: 2025-05-29
Attending: INTERNAL MEDICINE
Payer: MEDICARE

## 2025-05-29 ENCOUNTER — HOSPITAL ENCOUNTER (OUTPATIENT)
Facility: HOSPITAL | Age: 70
Discharge: HOME | End: 2025-05-29
Attending: INTERNAL MEDICINE
Payer: MEDICARE

## 2025-05-29 VITALS — WEIGHT: 262 LBS | BODY MASS INDEX: 36.68 KG/M2 | HEIGHT: 71 IN

## 2025-05-29 VITALS — HEIGHT: 70 IN | BODY MASS INDEX: 37.37 KG/M2 | WEIGHT: 261 LBS

## 2025-05-29 DIAGNOSIS — Z78.0 POST-MENOPAUSAL: ICD-10-CM

## 2025-05-29 DIAGNOSIS — Z00.00 MEDICARE ANNUAL WELLNESS VISIT, SUBSEQUENT: ICD-10-CM

## 2025-05-29 DIAGNOSIS — Z12.31 ENCOUNTER FOR SCREENING MAMMOGRAM FOR MALIGNANT NEOPLASM OF BREAST: ICD-10-CM

## 2025-05-29 PROCEDURE — 77080 DXA BONE DENSITY AXIAL: CPT

## 2025-05-29 PROCEDURE — 77063 BREAST TOMOSYNTHESIS BI: CPT

## 2025-06-05 ENCOUNTER — RESULTS FOLLOW-UP (OUTPATIENT)
Age: 70
End: 2025-06-05

## 2025-08-27 ENCOUNTER — OFFICE VISIT (OUTPATIENT)
Age: 70
End: 2025-08-27
Payer: MEDICARE

## 2025-08-27 VITALS
HEART RATE: 61 BPM | SYSTOLIC BLOOD PRESSURE: 134 MMHG | TEMPERATURE: 97 F | WEIGHT: 260 LBS | HEIGHT: 70 IN | OXYGEN SATURATION: 97 % | BODY MASS INDEX: 37.22 KG/M2 | DIASTOLIC BLOOD PRESSURE: 88 MMHG

## 2025-08-27 DIAGNOSIS — E78.00 HYPERCHOLESTEROLEMIA: ICD-10-CM

## 2025-08-27 DIAGNOSIS — E66.09 CLASS 1 OBESITY DUE TO EXCESS CALORIES WITH SERIOUS COMORBIDITY AND BODY MASS INDEX (BMI) OF 34.0 TO 34.9 IN ADULT: ICD-10-CM

## 2025-08-27 DIAGNOSIS — E66.811 CLASS 1 OBESITY DUE TO EXCESS CALORIES WITH SERIOUS COMORBIDITY AND BODY MASS INDEX (BMI) OF 34.0 TO 34.9 IN ADULT: ICD-10-CM

## 2025-08-27 DIAGNOSIS — E83.52 HYPERCALCEMIA: ICD-10-CM

## 2025-08-27 DIAGNOSIS — G50.0 TRIGEMINAL NEURALGIA OF RIGHT SIDE OF FACE: ICD-10-CM

## 2025-08-27 DIAGNOSIS — E11.8 CONTROLLED TYPE 2 DIABETES MELLITUS WITH COMPLICATION, WITHOUT LONG-TERM CURRENT USE OF INSULIN (HCC): ICD-10-CM

## 2025-08-27 DIAGNOSIS — E05.90 HYPERTHYROIDISM: ICD-10-CM

## 2025-08-27 DIAGNOSIS — I10 PRIMARY HYPERTENSION: Primary | ICD-10-CM

## 2025-08-27 DIAGNOSIS — F33.41 RECURRENT MAJOR DEPRESSIVE DISORDER, IN PARTIAL REMISSION: ICD-10-CM

## 2025-08-27 LAB
ALBUMIN SERPL-MCNC: 4.2 G/DL (ref 3.5–5.2)
ALBUMIN/GLOB SERPL: 1.3 (ref 1.1–2.2)
ALP SERPL-CCNC: 72 U/L (ref 35–104)
ALT SERPL-CCNC: 31 U/L (ref 10–35)
ANION GAP SERPL CALC-SCNC: 10 MMOL/L (ref 2–14)
AST SERPL-CCNC: 23 U/L (ref 10–35)
BILIRUB SERPL-MCNC: 0.6 MG/DL (ref 0–1.2)
BUN SERPL-MCNC: 17 MG/DL (ref 8–23)
BUN/CREAT SERPL: 18 (ref 12–20)
CALCIUM SERPL-MCNC: 10.9 MG/DL (ref 8.8–10.2)
CHLORIDE SERPL-SCNC: 105 MMOL/L (ref 98–107)
CHOLEST SERPL-MCNC: 155 MG/DL (ref 0–200)
CO2 SERPL-SCNC: 26 MMOL/L (ref 20–29)
CREAT SERPL-MCNC: 0.95 MG/DL (ref 0.6–1)
CREAT UR-MCNC: 376 MG/DL (ref 28–217)
ERYTHROCYTE [DISTWIDTH] IN BLOOD BY AUTOMATED COUNT: 12.2 % (ref 11.5–14.5)
EST. AVERAGE GLUCOSE BLD GHB EST-MCNC: 125 MG/DL
GLOBULIN SER CALC-MCNC: 3.2 G/DL (ref 2–4)
GLUCOSE SERPL-MCNC: 110 MG/DL (ref 65–100)
HBA1C MFR BLD: 6 % (ref 4–5.6)
HCT VFR BLD AUTO: 43.2 % (ref 35–47)
HDLC SERPL-MCNC: 40 MG/DL (ref 40–60)
HDLC SERPL: 3.8 (ref 0–5)
HGB BLD-MCNC: 14.2 G/DL (ref 11.5–16)
LDLC SERPL CALC-MCNC: 74 MG/DL (ref 0–100)
MCH RBC QN AUTO: 30.5 PG (ref 26–34)
MCHC RBC AUTO-ENTMCNC: 32.9 G/DL (ref 30–36.5)
MCV RBC AUTO: 92.7 FL (ref 80–99)
MICROALBUMIN UR-MCNC: 15.7 MG/DL
MICROALBUMIN/CREAT UR-RTO: 42 MG/G
NRBC # BLD: 0 K/UL (ref 0–0.01)
NRBC BLD-RTO: 0 PER 100 WBC
PLATELET # BLD AUTO: 229 K/UL (ref 150–400)
PMV BLD AUTO: 10.1 FL (ref 8.9–12.9)
POTASSIUM SERPL-SCNC: 5.7 MMOL/L (ref 3.5–5.1)
PROT SERPL-MCNC: 7.3 G/DL (ref 6.4–8.3)
RBC # BLD AUTO: 4.66 M/UL (ref 3.8–5.2)
SODIUM SERPL-SCNC: 141 MMOL/L (ref 136–145)
TRIGL SERPL-MCNC: 206 MG/DL (ref 0–150)
VLDLC SERPL CALC-MCNC: 41 MG/DL
WBC # BLD AUTO: 8.1 K/UL (ref 3.6–11)

## 2025-08-27 PROCEDURE — 3044F HG A1C LEVEL LT 7.0%: CPT | Performed by: INTERNAL MEDICINE

## 2025-08-27 PROCEDURE — G8399 PT W/DXA RESULTS DOCUMENT: HCPCS | Performed by: INTERNAL MEDICINE

## 2025-08-27 PROCEDURE — 99214 OFFICE O/P EST MOD 30 MIN: CPT | Performed by: INTERNAL MEDICINE

## 2025-08-27 PROCEDURE — 1036F TOBACCO NON-USER: CPT | Performed by: INTERNAL MEDICINE

## 2025-08-27 PROCEDURE — 1123F ACP DISCUSS/DSCN MKR DOCD: CPT | Performed by: INTERNAL MEDICINE

## 2025-08-27 PROCEDURE — 1160F RVW MEDS BY RX/DR IN RCRD: CPT | Performed by: INTERNAL MEDICINE

## 2025-08-27 PROCEDURE — G8417 CALC BMI ABV UP PARAM F/U: HCPCS | Performed by: INTERNAL MEDICINE

## 2025-08-27 PROCEDURE — 3075F SYST BP GE 130 - 139MM HG: CPT | Performed by: INTERNAL MEDICINE

## 2025-08-27 PROCEDURE — 1159F MED LIST DOCD IN RCRD: CPT | Performed by: INTERNAL MEDICINE

## 2025-08-27 PROCEDURE — 1090F PRES/ABSN URINE INCON ASSESS: CPT | Performed by: INTERNAL MEDICINE

## 2025-08-27 PROCEDURE — 3017F COLORECTAL CA SCREEN DOC REV: CPT | Performed by: INTERNAL MEDICINE

## 2025-08-27 PROCEDURE — 2022F DILAT RTA XM EVC RTNOPTHY: CPT | Performed by: INTERNAL MEDICINE

## 2025-08-27 PROCEDURE — 3079F DIAST BP 80-89 MM HG: CPT | Performed by: INTERNAL MEDICINE

## 2025-08-27 PROCEDURE — G8427 DOCREV CUR MEDS BY ELIG CLIN: HCPCS | Performed by: INTERNAL MEDICINE

## 2025-08-27 RX ORDER — METFORMIN HYDROCHLORIDE 500 MG/1
1000 TABLET, EXTENDED RELEASE ORAL
Qty: 180 TABLET | Refills: 0 | Status: SHIPPED | OUTPATIENT
Start: 2025-08-27

## 2025-08-27 RX ORDER — LISINOPRIL 20 MG/1
20 TABLET ORAL DAILY
Qty: 90 TABLET | Refills: 1 | Status: SHIPPED | OUTPATIENT
Start: 2025-08-27

## 2025-08-27 RX ORDER — PRAVASTATIN SODIUM 20 MG
20 TABLET ORAL DAILY
Qty: 90 TABLET | Refills: 1 | Status: SHIPPED | OUTPATIENT
Start: 2025-08-27

## 2025-08-27 RX ORDER — LANCETS 33 GAUGE
EACH MISCELLANEOUS
Qty: 1 EACH | Refills: 0 | Status: SHIPPED | OUTPATIENT
Start: 2025-08-27

## 2025-08-27 RX ORDER — GLUCOSAMINE HCL/CHONDROITIN SU 500-400 MG
CAPSULE ORAL
Qty: 100 STRIP | Refills: 0 | Status: SHIPPED | OUTPATIENT
Start: 2025-08-27

## 2025-08-27 RX ORDER — METOPROLOL TARTRATE 50 MG
50 TABLET ORAL 2 TIMES DAILY
Qty: 180 TABLET | Refills: 1 | Status: SHIPPED | OUTPATIENT
Start: 2025-08-27

## 2025-08-27 ASSESSMENT — PATIENT HEALTH QUESTIONNAIRE - PHQ9
SUM OF ALL RESPONSES TO PHQ QUESTIONS 1-9: 0
2. FEELING DOWN, DEPRESSED OR HOPELESS: NOT AT ALL
SUM OF ALL RESPONSES TO PHQ QUESTIONS 1-9: 0
1. LITTLE INTEREST OR PLEASURE IN DOING THINGS: NOT AT ALL
SUM OF ALL RESPONSES TO PHQ QUESTIONS 1-9: 0
SUM OF ALL RESPONSES TO PHQ QUESTIONS 1-9: 0

## (undated) DEVICE — SUT ETHLN 3-0 18IN PS2 BLK --

## (undated) DEVICE — INTENT OT USE PROVIDES A STERILE INTERFACE BETWEEN THE OPERATING ROOM SURGICAL LAMPS (NON-STERILE) AND THE SURGEON OR STAFF WORKING IN THE STERILE FIELD.: Brand: ASPEN® ALC PLUS LIGHT HANDLE COVER

## (undated) DEVICE — 3M™ MEDIPORE™ H SOFT CLOTH SURGICAL TAPE, 2863, 3 IN X 10 YD, 12/CASE: Brand: 3M™ MEDIPORE™

## (undated) DEVICE — 4-PORT MANIFOLD: Brand: NEPTUNE 2

## (undated) DEVICE — GRASPER SUT 60DEG SHRP TIP LO PROF FOR RAP ACCS IN SHLDR

## (undated) DEVICE — 3M™ STERI-DRAPE™ U-DRAPE 1015: Brand: STERI-DRAPE™

## (undated) DEVICE — 4.5 MM INCISOR PLUS STRAIGHT                                    BLADES, POWER/EP-1, VIOLET, PACKAGED                                    6 PER BOX, STERILE

## (undated) DEVICE — SOLUTION IRRIG 3000ML LAC RINGERS ARTHROMTC PLAS CONT

## (undated) DEVICE — GLOVE ORTHO 8   MSG9480

## (undated) DEVICE — DRAPE,REIN 53X77,STERILE: Brand: MEDLINE

## (undated) DEVICE — UNIV CANN 5MM/76MM LTX FREE (10) BLUE

## (undated) DEVICE — SET IRRIG W 96IN TBNG 4 LN FLX BG

## (undated) DEVICE — PREP SKN CHLRAPRP APL 26ML STR --

## (undated) DEVICE — ARTHROSCOPY - RICHMOND: Brand: MEDLINE INDUSTRIES, INC.

## (undated) DEVICE — PAD,ABDOMINAL,5"X9",ST,LF,25/BX: Brand: MEDLINE INDUSTRIES, INC.

## (undated) DEVICE — 4.5 MM HELICUT STRAIGHT BURRS,                                    POWER/EP-1, SLATE, 5000 MAXIMUM RPM,                                    PACKAGED 6 PER BOX, STERILE: Brand: DYONICS HELICUT

## (undated) DEVICE — TAPE,CLOTH/SILK,CURAD,3"X10YD,LF,40/CS: Brand: CURAD

## (undated) DEVICE — GOWN,PREVENTION PLUS,XLN/XL,ST,24/CS: Brand: MEDLINE

## (undated) DEVICE — GARMENT,MEDLINE,DVT,INT,CALF,MED, GEN2: Brand: MEDLINE

## (undated) DEVICE — SHOULDER SUSPENSION KIT 6 PER BOX

## (undated) DEVICE — GLOVE SURG SZ 65 THK91MIL LTX FREE SYN POLYISOPRENE